# Patient Record
Sex: FEMALE | Race: WHITE | NOT HISPANIC OR LATINO | Employment: FULL TIME | URBAN - NONMETROPOLITAN AREA
[De-identification: names, ages, dates, MRNs, and addresses within clinical notes are randomized per-mention and may not be internally consistent; named-entity substitution may affect disease eponyms.]

---

## 2017-01-09 DIAGNOSIS — H91.93 DECREASED HEARING, BILATERAL: Primary | ICD-10-CM

## 2017-01-11 ENCOUNTER — OFFICE VISIT (OUTPATIENT)
Dept: FAMILY MEDICINE | Facility: OTHER | Age: 22
End: 2017-01-11
Attending: FAMILY MEDICINE
Payer: COMMERCIAL

## 2017-01-11 VITALS
DIASTOLIC BLOOD PRESSURE: 64 MMHG | TEMPERATURE: 99.4 F | HEART RATE: 80 BPM | BODY MASS INDEX: 35.97 KG/M2 | SYSTOLIC BLOOD PRESSURE: 118 MMHG | OXYGEN SATURATION: 99 % | HEIGHT: 63 IN | WEIGHT: 203 LBS

## 2017-01-11 DIAGNOSIS — J01.90 ACUTE SINUSITIS, RECURRENCE NOT SPECIFIED, UNSPECIFIED LOCATION: Primary | ICD-10-CM

## 2017-01-11 PROCEDURE — 99213 OFFICE O/P EST LOW 20 MIN: CPT | Performed by: FAMILY MEDICINE

## 2017-01-11 RX ORDER — FLUTICASONE PROPIONATE 50 MCG
1-2 SPRAY, SUSPENSION (ML) NASAL DAILY
Qty: 1 BOTTLE | Refills: 1 | Status: SHIPPED | OUTPATIENT
Start: 2017-01-11 | End: 2017-02-02

## 2017-01-11 ASSESSMENT — PAIN SCALES - GENERAL: PAINLEVEL: NO PAIN (0)

## 2017-01-11 NOTE — PATIENT INSTRUCTIONS
Symptomatic cares - fluids, rest, saline sinus spray or neti pot.  Flonase daily - script sent.  Consider antibiotics if symptoms persist 10 days or more, or significantly worsening symptoms - fevers, etc.  Follow up as needed.

## 2017-01-11 NOTE — NURSING NOTE
"Chief Complaint   Patient presents with     Sinus Problem     sinus pain, runny nose, no fevers noted for the last 2 days.        Initial /64 mmHg  Pulse 80  Temp(Src) 99.4  F (37.4  C) (Tympanic)  Ht 5' 3\" (1.6 m)  Wt 203 lb (92.08 kg)  BMI 35.97 kg/m2  SpO2 99% Estimated body mass index is 35.97 kg/(m^2) as calculated from the following:    Height as of this encounter: 5' 3\" (1.6 m).    Weight as of this encounter: 203 lb (92.08 kg).  BP completed using cuff size: silvina Cooper      "

## 2017-01-11 NOTE — PROGRESS NOTES
SUBJECTIVE:  Kamila Ma is a 21 year old female here with concerns about sinus infection.  She states onset of symptoms were 2 day(s) ago.  She has had ethmoid, sphenoid pressure. Course of illness is same. Severity moderate  Current and Associated symptoms: nasal congestion and sore throat  Predisposing factors include sinusitis treated 1 month ago with Augmentin with good relief of symptoms. 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     Diarrhea 01/01/2011     Hearing loss 01/01/2011     Multiple allergies 01/01/2011     Otomycosis of right ear 09/10/2012     Mixed hearing loss 12/4/2015     Acute recurrent maxillary sinusitis 12/4/2015     Other iron deficiency anemia 7/18/2016     Bariatric surgery status 7/18/2016     Vasovagal near syncope 7/18/2016     Current Outpatient Prescriptions   Medication Sig Dispense Refill     fluticasone (FLONASE) 50 MCG/ACT spray Spray 1-2 sprays into both nostrils daily 1 Bottle 1     ferrous sulfate (IRON) 325 (65 FE) MG tablet Take 1 tablet (325 mg) by mouth 2 times daily 60 tablet 1     Pediatric Multivit-Minerals-C (FLINTSTONES COMPLETE PO) Take 1 tablet by mouth daily.        Calcium-Vitamin D-Vitamin K (VIACTIV PO) Take 1 tablet by mouth daily.        History   Smoking status     Never Smoker    Smokeless tobacco     Never Used       ROS:  CONSTITUTIONAL:NEGATIVE  for fever   INTEGUMENTARY/SKIN: NEGATIVE for rash generalized  EYES: NEGATIVE for vision changes or irritation  ENT/MOUTH: POSITIVE for nasal congestion, sinus pressure and sore throat and NEGATIVE for ear pain bilateral and swollen glands  RESP:NEGATIVE for significant cough or SOB  CV: NEGATIVE for chest pain, palpitations or peripheral edema  GI: NEGATIVE for nausea, abdominal pain, heartburn, or change in bowel habits    OBJECTIVE:  /64 mmHg  Pulse 80  Temp(Src) 99.4  F  "(37.4  C) (Tympanic)  Ht 5' 3\" (1.6 m)  Wt 203 lb (92.08 kg)  BMI 35.97 kg/m2  SpO2 99%  Exam:GENERAL APPEARANCE: healthy, alert and no distress  EYES: EOMI,  PERRL, conjunctiva clear  HENT: TMs with scarring, perforations, old; no erythema, drainage; canals normal  NECK: supple, nontender, no lymphadenopathy  RESP: lungs clear to auscultation - no rales, rhonchi or wheezes  CV: regular rates and rhythm, normal S1 S2, no murmur noted  SKIN: no suspicious lesions or rashes    ASSESSMENT:  (J01.90) Acute sinusitis, recurrence not specified, unspecified location  (primary encounter diagnosis)  Plan: fluticasone (FLONASE) 50 MCG/ACT spray    PLAN:  Patient Instructions   Symptomatic cares - fluids, rest, saline sinus spray or neti pot.  Flonase daily - script sent.  Consider antibiotics if symptoms persist 10 days or more, or significantly worsening symptoms - fevers, etc.  Follow up as needed.          "

## 2017-01-17 ENCOUNTER — OFFICE VISIT (OUTPATIENT)
Dept: AUDIOLOGY | Facility: OTHER | Age: 22
End: 2017-01-17
Attending: AUDIOLOGIST
Payer: COMMERCIAL

## 2017-01-17 DIAGNOSIS — H90.6 MIXED HEARING LOSS, BILATERAL: Primary | ICD-10-CM

## 2017-01-17 DIAGNOSIS — H69.91 DYSFUNCTION OF EUSTACHIAN TUBE, RIGHT: ICD-10-CM

## 2017-01-17 PROCEDURE — 92557 COMPREHENSIVE HEARING TEST: CPT | Performed by: AUDIOLOGIST

## 2017-01-17 PROCEDURE — 92567 TYMPANOMETRY: CPT | Performed by: AUDIOLOGIST

## 2017-01-17 NOTE — PROGRESS NOTES
Audiology Evaluation Completed. Please refer SCANNED AUDIOGRAM and/or TYMPANOGRAM for BACKGROUND, RESULTS, RECOMMENDATIONS.    UNDER RECOMMENDATIONS ON AUDIOGRAM PATIENT REFERRED TO ENT WITH SYMPTOMS      Maria R WIGGINS, [facility]-A  Audiologist #[id number]        [facility] REFERRAL/ORDER NEEDED TO ENT BY AUDIOLOGY completed

## 2017-02-02 ENCOUNTER — OFFICE VISIT (OUTPATIENT)
Dept: FAMILY MEDICINE | Facility: OTHER | Age: 22
End: 2017-02-02
Attending: FAMILY MEDICINE
Payer: COMMERCIAL

## 2017-02-02 VITALS
WEIGHT: 210 LBS | HEART RATE: 68 BPM | BODY MASS INDEX: 37.21 KG/M2 | HEIGHT: 63 IN | SYSTOLIC BLOOD PRESSURE: 110 MMHG | DIASTOLIC BLOOD PRESSURE: 74 MMHG | TEMPERATURE: 98.9 F

## 2017-02-02 DIAGNOSIS — K59.09 OTHER CONSTIPATION: ICD-10-CM

## 2017-02-02 DIAGNOSIS — N92.6 IRREGULAR MENSTRUAL CYCLE: ICD-10-CM

## 2017-02-02 DIAGNOSIS — R10.84 ABDOMINAL PAIN, GENERALIZED: Primary | ICD-10-CM

## 2017-02-02 LAB
ALBUMIN SERPL-MCNC: 3.6 G/DL (ref 3.4–5)
ALP SERPL-CCNC: 65 U/L (ref 40–150)
ALT SERPL W P-5'-P-CCNC: 19 U/L (ref 0–50)
ANION GAP SERPL CALCULATED.3IONS-SCNC: 5 MMOL/L (ref 3–14)
AST SERPL W P-5'-P-CCNC: 12 U/L (ref 0–45)
BASOPHILS # BLD AUTO: 0 10E9/L (ref 0–0.2)
BASOPHILS NFR BLD AUTO: 0.6 %
BILIRUB SERPL-MCNC: 0.3 MG/DL (ref 0.2–1.3)
BUN SERPL-MCNC: 15 MG/DL (ref 7–30)
CALCIUM SERPL-MCNC: 9 MG/DL (ref 8.5–10.1)
CHLORIDE SERPL-SCNC: 106 MMOL/L (ref 94–109)
CO2 SERPL-SCNC: 28 MMOL/L (ref 20–32)
CREAT SERPL-MCNC: 0.74 MG/DL (ref 0.52–1.04)
DIFFERENTIAL METHOD BLD: ABNORMAL
EOSINOPHIL # BLD AUTO: 0.1 10E9/L (ref 0–0.7)
EOSINOPHIL NFR BLD AUTO: 1.1 %
ERYTHROCYTE [DISTWIDTH] IN BLOOD BY AUTOMATED COUNT: 14.7 % (ref 10–15)
GFR SERPL CREATININE-BSD FRML MDRD: NORMAL ML/MIN/1.7M2
GLUCOSE SERPL-MCNC: 83 MG/DL (ref 70–99)
HCT VFR BLD AUTO: 36.4 % (ref 35–47)
HGB BLD-MCNC: 11.4 G/DL (ref 11.7–15.7)
IMM GRANULOCYTES # BLD: 0 10E9/L (ref 0–0.4)
IMM GRANULOCYTES NFR BLD: 0.3 %
LYMPHOCYTES # BLD AUTO: 2.6 10E9/L (ref 0.8–5.3)
LYMPHOCYTES NFR BLD AUTO: 39.8 %
MCH RBC QN AUTO: 23.9 PG (ref 26.5–33)
MCHC RBC AUTO-ENTMCNC: 31.3 G/DL (ref 31.5–36.5)
MCV RBC AUTO: 76 FL (ref 78–100)
MONOCYTES # BLD AUTO: 0.4 10E9/L (ref 0–1.3)
MONOCYTES NFR BLD AUTO: 6.3 %
NEUTROPHILS # BLD AUTO: 3.4 10E9/L (ref 1.6–8.3)
NEUTROPHILS NFR BLD AUTO: 51.9 %
NRBC # BLD AUTO: 0 10*3/UL
NRBC BLD AUTO-RTO: 0 /100
PLATELET # BLD AUTO: 378 10E9/L (ref 150–450)
POTASSIUM SERPL-SCNC: 4.2 MMOL/L (ref 3.4–5.3)
PROT SERPL-MCNC: 7.1 G/DL (ref 6.8–8.8)
RBC # BLD AUTO: 4.77 10E12/L (ref 3.8–5.2)
SODIUM SERPL-SCNC: 139 MMOL/L (ref 133–144)
WBC # BLD AUTO: 6.6 10E9/L (ref 4–11)

## 2017-02-02 PROCEDURE — 99214 OFFICE O/P EST MOD 30 MIN: CPT | Performed by: FAMILY MEDICINE

## 2017-02-02 PROCEDURE — 85025 COMPLETE CBC W/AUTO DIFF WBC: CPT | Performed by: FAMILY MEDICINE

## 2017-02-02 PROCEDURE — 74020 ZZHC X-RAY ABDOMEN COMPLETE: CPT | Mod: TC | Performed by: RADIOLOGY

## 2017-02-02 PROCEDURE — 80053 COMPREHEN METABOLIC PANEL: CPT | Performed by: FAMILY MEDICINE

## 2017-02-02 PROCEDURE — 36415 COLL VENOUS BLD VENIPUNCTURE: CPT | Performed by: FAMILY MEDICINE

## 2017-02-02 ASSESSMENT — ANXIETY QUESTIONNAIRES
IF YOU CHECKED OFF ANY PROBLEMS ON THIS QUESTIONNAIRE, HOW DIFFICULT HAVE THESE PROBLEMS MADE IT FOR YOU TO DO YOUR WORK, TAKE CARE OF THINGS AT HOME, OR GET ALONG WITH OTHER PEOPLE: NOT DIFFICULT AT ALL
3. WORRYING TOO MUCH ABOUT DIFFERENT THINGS: NOT AT ALL
GAD7 TOTAL SCORE: 0
5. BEING SO RESTLESS THAT IT IS HARD TO SIT STILL: NOT AT ALL
1. FEELING NERVOUS, ANXIOUS, OR ON EDGE: NOT AT ALL
6. BECOMING EASILY ANNOYED OR IRRITABLE: NOT AT ALL
7. FEELING AFRAID AS IF SOMETHING AWFUL MIGHT HAPPEN: NOT AT ALL
2. NOT BEING ABLE TO STOP OR CONTROL WORRYING: NOT AT ALL

## 2017-02-02 ASSESSMENT — PAIN SCALES - GENERAL: PAINLEVEL: NO PAIN (0)

## 2017-02-02 ASSESSMENT — PATIENT HEALTH QUESTIONNAIRE - PHQ9: 5. POOR APPETITE OR OVEREATING: NOT AT ALL

## 2017-02-02 NOTE — MR AVS SNAPSHOT
"              After Visit Summary   2/2/2017    Kamila Ma    MRN: [id number]           Patient Information     Date Of Birth          1995        Visit Information        Provider Department      2/2/2017 11:00 AM Sol Kim MD [facility]        Today's Diagnoses     Abdominal pain, generalized    -  1       Care Instructions    Increase fiber, fluids and exercise        Follow-ups after your visit        Your next 10 appointments already scheduled     Feb 07, 2017  8:45 AM   (Arrive by 8:30 AM)   New Visit with India Raymundo PA-C   [facility] ([facility])    [address] [address]   [phone]            Feb 07, 2017  9:15 AM   (Arrive by 9:00 AM)   Hearing Aid Initial with CHRIS Sloan   [facility] ([facility])    [address] [address]   [phone]              Who to contact     If you have questions or need follow up information about today's clinic visit or your schedule please contact [facility] directly at [phone].  Normal or non-critical lab and imaging results will be communicated to you by [employer], letter or phone within 4 business days after the clinic has received the results. If you do not hear from us within 7 days, please contact the clinic through [employer] or phone. If you have a critical or abnormal lab result, we will notify you by phone as soon as possible.  Submit refill requests through [employer] or call your pharmacy and they will forward the refill request to us. Please allow 3 business days for your refill to be completed.          Additional Information About Your Visit        MyChart Information     [employer] lets you send messages to your doctor, view your test results, renew your prescriptions, schedule appointments and more. To sign up, go to [email or web address] . Click on \"Log in\" on the left side of the screen, which will take you to the Welcome " "page. Then click on \"Sign up Now\" on the right side of the page.     You will be asked to enter the access code listed below, as well as some personal information. Please follow the directions to create your username and password.     Your access code is: [email or web address]  Expires: 3/8/2017  9:41 AM     Your access code will  in 90 days. If you need help or a new code, please call your [place] clinic or [phone].        Care EveryWhere ID     This is your Care EveryWhere ID. This could be used by other organizations to access your [place] medical records  [id number]        Your Vitals Were     Pulse Temperature Height BMI (Body Mass Index)          68 98.9  F (37.2  C) (Tympanic) 5' 3\" (1.6 m) 37.21 kg/m2         Blood Pressure from Last 3 Encounters:   17 110/74   17 118/64   16 108/60    Weight from Last 3 Encounters:   17 210 lb (95.255 kg)   17 203 lb (92.08 kg)   16 205 lb (92.987 kg)              We Performed the Following     CBC with platelets and differential     Comprehensive metabolic panel     XR ABDOMEN 2 VW (Clinic Performed)          Today's Medication Changes          These changes are accurate as of: 17 12:15 PM.  If you have any questions, ask your nurse or doctor.               Stop taking these medicines if you haven't already. Please contact your care team if you have questions.     fluticasone 50 MCG/ACT spray   Commonly known as:  FLONASE   Stopped by:  Sol Kim MD                    Primary Care Provider Office Phone # Fax #    Sol Kim [phone]       [facility] [place] [address] [place], [place] [address]  [place] [address]        Thank you!     Thank you for choosing [facility]  for your care. Our goal is always to provide you with excellent care. Hearing back from our patients is one way we can continue to improve our services. Please take a few minutes to complete the written survey that you may " receive in the mail after your visit with us. Thank you!             Your Updated Medication List - Protect others around you: Learn how to safely use, store and throw away your medicines at www.disposemymeds.org.          This list is accurate as of: 2/2/17 12:15 PM.  Always use your most recent med list.                   Brand Name Dispense Instructions for use    ferrous sulfate 325 (65 FE) MG tablet    IRON    60 tablet    Take 1 tablet (325 mg) by mouth 2 times daily       FLINTSTONES COMPLETE PO      Take 1 tablet by mouth daily.       VIACTIV PO      Take 1 tablet by mouth daily.

## 2017-02-02 NOTE — NURSING NOTE
"Chief Complaint   Patient presents with     Abdominal Pain       Initial /74 mmHg  Pulse 68  Temp(Src) 98.9  F (37.2  C) (Tympanic)  Ht 5' 3\" (1.6 m)  Wt 210 lb (95.255 kg)  BMI 37.21 kg/m2 Estimated body mass index is 37.21 kg/(m^2) as calculated from the following:    Height as of this encounter: 5' 3\" (1.6 m).    Weight as of this encounter: 210 lb (95.255 kg).  BP completed using cuff size: jazzy Bennett    "

## 2017-02-02 NOTE — PROGRESS NOTES
SUBJECTIVE:                                                    Kamila Ma is a 21 year old female who presents to clinic today for the following health issues:      Abdominal Pain      Duration: 1-2 weeks    Description (location/character/radiation): stabbing pain in LLQ and when laying down it goes all over       Associated flank pain: None    Intensity:  moderate    Accompanying signs and symptoms:        Fever/Chills: no        Gas/Bloating: YES- gas       Nausea/vomitting: no        Diarrhea: no        Dysuria or Hematuria: no     History (previous similar pain/trauma/previous testing): none    Precipitating or alleviating factors:       Pain worse with eating/BM/urination: no       Pain relieved by BM: YES- subside for a little while    Therapies tried and outcome: fiber bars    LMP:  1/16/17 approx       Vaginal Bleeding (Dysmenorrhea)      Onset: very irregular    Description:  Duration of bleeding episodes: 5-14 days  Frequency between periods:  14-90 days  Describe bleeding/flow:   Clots: YES  Number of pads/hour: 1  Cramping: severe and moderate    Intensity:  mild, moderate    Accompanying signs and symptoms: weight gain, hirsuit    History (similar episodes/previous evaluation): suspects PCOS but never treated in the past    Precipitating or alleviating factors: None    Therapies tried and outcome: OCPs in the past but she got terribly cervantes         Problem list and histories reviewed & adjusted, as indicated.  Additional history: as documented    Current Outpatient Prescriptions   Medication Sig Dispense Refill     ferrous sulfate (IRON) 325 (65 FE) MG tablet Take 1 tablet (325 mg) by mouth 2 times daily 60 tablet 1     Pediatric Multivit-Minerals-C (FLINTSTONES COMPLETE PO) Take 1 tablet by mouth daily.        Calcium-Vitamin D-Vitamin K (VIACTIV PO) Take 1 tablet by mouth daily.        Problem list, Medication list, Allergies, and Medical/Social/Surgical histories reviewed in [facility] and  "updated as appropriate.    ROS:  Constitutional, HEENT, cardiovascular, pulmonary, gi and gu systems are negative, except as otherwise noted.    OBJECTIVE:                                                    /74 mmHg  Pulse 68  Temp(Src) 98.9  F (37.2  C) (Tympanic)  Ht 5' 3\" (1.6 m)  Wt 210 lb (95.255 kg)  BMI 37.21 kg/m2  Body mass index is 37.21 kg/(m^2).  GENERAL APPEARANCE: healthy, alert, no distress and over weight  RESP: lungs clear to auscultation - no rales, rhonchi or wheezes  CV: regular rates and rhythm, normal S1 S2, no S3 or S4 and no murmur, click or rub  ABDOMEN: bowel sounds normal, obese, no hepatosplenomegaly or masses and tender LLQ  PSYCH: mentation appears normal and affect normal/bright       ASSESSMENT/PLAN:                                                    1. Abdominal pain, generalized    - CBC with platelets and differential  - Comprehensive metabolic panel  - XR ABDOMEN 2 VW (Clinic Performed)    2. Irregular menstrual cycle  Follow up to dicussed further    3.  Constipation  Discussed intreasing fluids, fiber, activity and good bacteria, suggest citrucel powder    Patient was agreeable to this plan and had no further questions.  See Patient Instructions    Sol Kim MD  [facility] [place]        "

## 2017-02-03 ASSESSMENT — ANXIETY QUESTIONNAIRES: GAD7 TOTAL SCORE: 0

## 2017-02-07 ENCOUNTER — OFFICE VISIT (OUTPATIENT)
Dept: AUDIOLOGY | Facility: OTHER | Age: 22
End: 2017-02-07
Attending: AUDIOLOGIST
Payer: COMMERCIAL

## 2017-02-07 ENCOUNTER — OFFICE VISIT (OUTPATIENT)
Dept: OTOLARYNGOLOGY | Facility: OTHER | Age: 22
End: 2017-02-07
Attending: PHYSICIAN ASSISTANT
Payer: COMMERCIAL

## 2017-02-07 VITALS
DIASTOLIC BLOOD PRESSURE: 74 MMHG | BODY MASS INDEX: 37.21 KG/M2 | WEIGHT: 210 LBS | TEMPERATURE: 98.6 F | SYSTOLIC BLOOD PRESSURE: 116 MMHG | HEIGHT: 63 IN | HEART RATE: 90 BPM

## 2017-02-07 DIAGNOSIS — H90.6 MIXED HEARING LOSS, BILATERAL: Primary | ICD-10-CM

## 2017-02-07 DIAGNOSIS — J34.2 DNS (DEVIATED NASAL SEPTUM): ICD-10-CM

## 2017-02-07 DIAGNOSIS — H69.91 DYSFUNCTION OF EUSTACHIAN TUBE, RIGHT: ICD-10-CM

## 2017-02-07 DIAGNOSIS — R09.81 NASAL CONGESTION: ICD-10-CM

## 2017-02-07 DIAGNOSIS — J34.89 NASAL OBSTRUCTION: ICD-10-CM

## 2017-02-07 PROCEDURE — 92590 ZZHC HEARING AID EXAM MONAURAL: CPT | Performed by: AUDIOLOGIST

## 2017-02-07 PROCEDURE — 92504 EAR MICROSCOPY EXAMINATION: CPT | Performed by: PHYSICIAN ASSISTANT

## 2017-02-07 PROCEDURE — 99214 OFFICE O/P EST MOD 30 MIN: CPT | Mod: 25 | Performed by: PHYSICIAN ASSISTANT

## 2017-02-07 ASSESSMENT — PAIN SCALES - GENERAL: PAINLEVEL: NO PAIN (0)

## 2017-02-07 NOTE — PROGRESS NOTES
BACKGROUND:  Kamila was seen today for consult regarding hearing aids, accompanied by her mother. The patient notes difficulty with communication in a variety of listening situations and would like to explore possible benefit obtained via use of amplification.      Patient has received medical clearance for hearing aid use from [place] LYNCH.  Kamila is a binaural hearing aid candidate and will receive a Hearing Aid Recommendation today for the right ear only. Kamila states she only wants a new aid for the right to replace her 6 year old aid that is glued together. She does not want it repaired reporting she has done this before.    RESULTS:  Brochure form [place] [employer] titled 'Legal rights and consumer information about purchasing a hearing instrument ' verbally reviewed and given to patient. Trial Period, cancellation fee, warranties highlighted. Estimated insurance coverage for hearing aids reviewed. She reports receiving third party assistance from [facility].         Thru use of hearing aids she would like to improve her ability to hear:     where there are groups and noise. She is in this environment weekly.    She would like to hear the television at a lower volume so that she can enjoy this activity with other people.     Kamila also reports trouble hearing in the car, at home, and on the telephone if there is not a volume control.      Discussed hearing aid styles, technology, features, and options at length with Kamila.  Sample devices were shown. Pros/Cons of options reviewed.  Expectations for amplification discussed. .Also discussed the importance of binaural amplification for hearing speech in the presence of background noise and localizing the directions of sounds.  Wireless options were also discussed at length and sample devices were shown.     Hearing Aid Recommendations: Hearing Aid Recommendation reviewed with patient. Pt chose to proceed with order and Purchase  Agreement completed. Copies provided to patient.      Hearing Aids:  Right Unitron Stride P BTE  Color: beige  Battery Size: 13  Earmold/Tips: Patient will use her current earmold      RECOMMENDATIONS:  Recommended to return to clinic in 2 weeks for hearing aid fitting, programming and orientation once hearing aid is ordered following payment by DERRELL LAU.    Maria R Emmanuel M.S.,CCC-A  Audiologist #[id number]

## 2017-02-07 NOTE — PATIENT INSTRUCTIONS
Sinus CT to be completed    Follow up for SInus CT-  May use FLonase 2 sprays to each nostril.   If there are concerns or questions, Call [phone] and ask for nurse

## 2017-02-07 NOTE — MR AVS SNAPSHOT
"              After Visit Summary   2/7/2017    Kamila Ma    MRN: [id number]           Patient Information     Date Of Birth          1995        Visit Information        Provider Department      2/7/2017 8:45 AM India Raymundo PA-C [facility] [place]        Today's Diagnoses     Mixed hearing loss, bilateral    -  1     Dysfunction of eustachian tube, right         DNS (deviated nasal septum)         Nasal congestion         Nasal obstruction           Care Instructions    Sinus CT to be completed    Follow up for SInus CT-  May use FLonase 2 sprays to each nostril.   If there are concerns or questions, Call [phone] and ask for nurse        Follow-ups after your visit        Your next 10 appointments already scheduled     Feb 07, 2017  9:15 AM   (Arrive by 9:00 AM)   Hearing Aid Initial with CHRIS Sloan   [facility] [place] ([facility])    [address]  [place] [address]   [phone]              Who to contact     If you have questions or need follow up information about today's clinic visit or your schedule please contact [facility] directly at [phone].  Normal or non-critical lab and imaging results will be communicated to you by [employer], letter or phone within 4 business days after the clinic has received the results. If you do not hear from us within 7 days, please contact the clinic through [employer] or phone. If you have a critical or abnormal lab result, we will notify you by phone as soon as possible.  Submit refill requests through [employer] or call your pharmacy and they will forward the refill request to us. Please allow 3 business days for your refill to be completed.          Additional Information About Your Visit        [employer] Information     [employer] lets you send messages to your doctor, view your test results, renew your prescriptions, schedule appointments and more. To sign up, go to [email or web address] . Click on \"Log in\" on " "the left side of the screen, which will take you to the Welcome page. Then click on \"Sign up Now\" on the right side of the page.     You will be asked to enter the access code listed below, as well as some personal information. Please follow the directions to create your username and password.     Your access code is: [email or web address]  Expires: 3/8/2017  9:41 AM     Your access code will  in 90 days. If you need help or a new code, please call your [place] clinic or [phone].        Care EveryWhere ID     This is your Care EveryWhere ID. This could be used by other organizations to access your [place] medical records  [id number]        Your Vitals Were     Pulse Temperature Height BMI (Body Mass Index)          90 98.6  F (37  C) (Tympanic) 5' 3\" (1.6 m) 37.21 kg/m2         Blood Pressure from Last 3 Encounters:   17 116/74   17 110/74   17 118/64    Weight from Last 3 Encounters:   17 210 lb (95.255 kg)   17 210 lb (95.255 kg)   17 203 lb (92.08 kg)              Today, you had the following     No orders found for display       Primary Care Provider Office Phone # Fax #    Sol Kim [phone]       [facility] [place] [address] [place] [id number]        Thank you!     Thank you for choosing [facility]  for your care. Our goal is always to provide you with excellent care. Hearing back from our patients is one way we can continue to improve our services. Please take a few minutes to complete the written survey that you may receive in the mail after your visit with us. Thank you!             Your Updated Medication List - Protect others around you: Learn how to safely use, store and throw away your medicines at www.disposemymeds.org.          This list is accurate as of: 17  8:52 AM.  Always use your most recent med list.                   Brand Name Dispense Instructions for use    ferrous sulfate 325 (65 FE) MG tablet "    IRON    60 tablet    Take 1 tablet (325 mg) by mouth 2 times daily       FLINTSTONES COMPLETE PO      Take 1 tablet by mouth daily.       VIACTIV PO      Take 1 tablet by mouth daily.

## 2017-02-07 NOTE — PROGRESS NOTES
Chief Complaint   Patient presents with     Consult     Mixed Hearing Loss; HA Medical Clearance     Kamila returns for medical clearance for hearing aids. She has a hx of MHL, bilateral. Her last ENT visit was in 2010 and last audiogram was completed in 1/2010.   She has overall been doing well.   She has noticed decrease with her hearing.   Patient has one aid and does not have coverage and wishes to only obtain aid for right ear.   Denies otalgia, otorrhea. She has had no recent OM.   Hx of BTT, 4.     Past hx  CT of Temporal bones, mastoid- 2/2015   Right ear changes with thickened TM c/w recurrent Otitis media and mastoiditis   She was referred to [facility] due to results from CT. There was a dehiscent right jugular bulb, Crouzon's Dz, MHL; they did not recommend further intervention.      Kamila has noticed an increase in nasal congestion, obstruction. She has no prior imaging of her sinuses. She has a hard time breathing thru right nares.   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     Diarrhea 01/01/2011     Hearing loss 01/01/2011     Multiple allergies 01/01/2011     Otomycosis of right ear 09/10/2012     Mixed hearing loss 12/4/2015     Acute recurrent maxillary sinusitis 12/4/2015     Other iron deficiency anemia 7/18/2016     Bariatric surgery status 7/18/2016     Vasovagal near syncope 7/18/2016        Allergies   Allergen Reactions     Sulfa Drugs Hives     SULFA (SULFONAMIDE ANTIBIOTICS)     Topamax Diarrhea     Current Outpatient Prescriptions   Medication     ferrous sulfate (IRON) 325 (65 FE) MG tablet     Pediatric Multivit-Minerals-C (FLINTSTONES COMPLETE PO)     Calcium-Vitamin D-Vitamin K (VIACTIV PO)     No current facility-administered medications for this visit.      ROS: 10 point ROS neg other than the symptoms noted above in the HPI.  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    Eyes: conjuctiva clear, PERRL, EOM intact  Ears: External ears normal. As noted below.    Nose: Nares normal. DNS to right with obstruction. Left with TH  Mouth: normal  Neck: Supple, no cervical adenopathy, no thyromegaly, Full range of motion in all planes      The lips appear normal and without lesion.  The dentition is  in good condition  The patient has a normal appearing and functioning hard and soft palate without bifid uvula or submucosal cleft.  The tongue is normal in appearance without erosive lesion or fungating mass.  The oral mucosa is soft and normal in appearance.  The patient also has a soft floor of mouth and base of tongue.  The tonsils are absent.    The ear canals were examined underneath the operating microscope and with an otologic speculum.  The canals were cleaned of all debris with gently suctioning and use of alligator forceps.   There is no granulation tissue or sign of cholesteatoma.  The patient tolerates this well. Right canal clear, scant cerumen removed. TM appears intact with jugular bulb.   Left canal clear. TM appears intact without perforation. No effusion.     Audiogram  Type As/B right and Type A left  Thresholds are overall moderate to moderate severe mixed loss with a decrease compared to 2010.       ASSESSMENT:    ICD-10-CM    1. Mixed hearing loss, bilateral H90.6    2. Dysfunction of eustachian tube, right H69.81    3. DNS (deviated nasal septum) J34.2 CT Sinus w/o Contrast     CT Sinus w/o Contrast   4. Nasal congestion R09.81 CT Sinus w/o Contrast     CT Sinus w/o Contrast   5. Nasal obstruction J34.89 CT Sinus w/o Contrast     CT Sinus w/o Contrast     Medically cleared for hearing aids.   She had completed " prior imaging which was reviewed and consults were completed.   Kamila was encouraged to maintain annual ear visits and audiogram due to her hx.       Pending Sinus CT. She may consider follow up with Dr. Casillas to review surgical options. She is interested at this time. Information provided and CT ordered        India Raymundo PA-C  ENT  [facility]  [phone]

## 2017-02-07 NOTE — NURSING NOTE
"Chief Complaint   Patient presents with     Consult     Mixed Hearing Loss; HA Medical Clearance       Initial /74 mmHg  Pulse 90  Temp(Src) 98.6  F (37  C) (Tympanic)  Ht 5' 3\" (1.6 m)  Wt 210 lb (95.255 kg)  BMI 37.21 kg/m2 Estimated body mass index is 37.21 kg/(m^2) as calculated from the following:    Height as of this encounter: 5' 3\" (1.6 m).    Weight as of this encounter: 210 lb (95.255 kg).  Medication Reconciliation: complete   Corina Robb      "

## 2017-03-01 ENCOUNTER — TELEPHONE (OUTPATIENT)
Dept: FAMILY MEDICINE | Facility: OTHER | Age: 22
End: 2017-03-01

## 2017-03-01 ENCOUNTER — OFFICE VISIT (OUTPATIENT)
Dept: FAMILY MEDICINE | Facility: OTHER | Age: 22
End: 2017-03-01
Attending: FAMILY MEDICINE
Payer: COMMERCIAL

## 2017-03-01 VITALS
TEMPERATURE: 98.4 F | HEART RATE: 104 BPM | BODY MASS INDEX: 39.86 KG/M2 | WEIGHT: 225 LBS | SYSTOLIC BLOOD PRESSURE: 116 MMHG | DIASTOLIC BLOOD PRESSURE: 70 MMHG | OXYGEN SATURATION: 98 %

## 2017-03-01 DIAGNOSIS — R68.83 CHILLS: ICD-10-CM

## 2017-03-01 DIAGNOSIS — J11.1 INFLUENZA-LIKE ILLNESS: ICD-10-CM

## 2017-03-01 DIAGNOSIS — R05.9 COUGH: Primary | ICD-10-CM

## 2017-03-01 LAB
BASOPHILS # BLD AUTO: 0 10E9/L (ref 0–0.2)
BASOPHILS NFR BLD AUTO: 0.6 %
DIFFERENTIAL METHOD BLD: ABNORMAL
EOSINOPHIL # BLD AUTO: 0.1 10E9/L (ref 0–0.7)
EOSINOPHIL NFR BLD AUTO: 1.7 %
ERYTHROCYTE [DISTWIDTH] IN BLOOD BY AUTOMATED COUNT: 14.8 % (ref 10–15)
HCT VFR BLD AUTO: 36 % (ref 35–47)
HGB BLD-MCNC: 11.1 G/DL (ref 11.7–15.7)
IMM GRANULOCYTES # BLD: 0 10E9/L (ref 0–0.4)
IMM GRANULOCYTES NFR BLD: 0.4 %
LYMPHOCYTES # BLD AUTO: 0.8 10E9/L (ref 0.8–5.3)
LYMPHOCYTES NFR BLD AUTO: 17.7 %
MCH RBC QN AUTO: 23.6 PG (ref 26.5–33)
MCHC RBC AUTO-ENTMCNC: 30.8 G/DL (ref 31.5–36.5)
MCV RBC AUTO: 76 FL (ref 78–100)
MONOCYTES # BLD AUTO: 0.5 10E9/L (ref 0–1.3)
MONOCYTES NFR BLD AUTO: 10.3 %
NEUTROPHILS # BLD AUTO: 3.2 10E9/L (ref 1.6–8.3)
NEUTROPHILS NFR BLD AUTO: 69.3 %
NRBC # BLD AUTO: 0 10*3/UL
NRBC BLD AUTO-RTO: 0 /100
PLATELET # BLD AUTO: 321 10E9/L (ref 150–450)
RBC # BLD AUTO: 4.71 10E12/L (ref 3.8–5.2)
WBC # BLD AUTO: 4.7 10E9/L (ref 4–11)

## 2017-03-01 PROCEDURE — 85025 COMPLETE CBC W/AUTO DIFF WBC: CPT | Performed by: FAMILY MEDICINE

## 2017-03-01 PROCEDURE — 99213 OFFICE O/P EST LOW 20 MIN: CPT | Performed by: FAMILY MEDICINE

## 2017-03-01 PROCEDURE — 36415 COLL VENOUS BLD VENIPUNCTURE: CPT | Performed by: FAMILY MEDICINE

## 2017-03-01 PROCEDURE — 71020 ZZHC CHEST TWO VIEWS, FRONT/LAT: CPT | Mod: TC | Performed by: RADIOLOGY

## 2017-03-01 ASSESSMENT — PAIN SCALES - GENERAL: PAINLEVEL: NO PAIN (0)

## 2017-03-01 NOTE — MR AVS SNAPSHOT
"              After Visit Summary   3/1/2017    Kamila Ma    MRN: [id number]           Patient Information     Date Of Birth          1995        Visit Information        Provider Department      3/1/2017 9:30 AM Sol Kim MD [facility] [place]        Today's Diagnoses     Cough    -  1    Chills        Influenza-like illness           Follow-ups after your visit        Who to contact     If you have questions or need follow up information about today's clinic visit or your schedule please contact [facility] directly at [phone].  Normal or non-critical lab and imaging results will be communicated to you by Ready Solarhart, letter or phone within 4 business days after the clinic has received the results. If you do not hear from us within 7 days, please contact the clinic through Ready Solarhart or phone. If you have a critical or abnormal lab result, we will notify you by phone as soon as possible.  Submit refill requests through ChemiSense or call your pharmacy and they will forward the refill request to us. Please allow 3 business days for your refill to be completed.          Additional Information About Your Visit        MyChart Information     ChemiSense lets you send messages to your doctor, view your test results, renew your prescriptions, schedule appointments and more. To sign up, go to [email or web address] . Click on \"Log in\" on the left side of the screen, which will take you to the Welcome page. Then click on \"Sign up Now\" on the right side of the page.     You will be asked to enter the access code listed below, as well as some personal information. Please follow the directions to create your username and password.     Your access code is: [email or web address]  Expires: 3/8/2017  9:41 AM     Your access code will  in 90 days. If you need help or a new code, please call your [facility] or [phone].        Care EveryWhere ID     This is your Care EveryWhere ID. This " could be used by other organizations to access your [facility] medical records  [id number]        Your Vitals Were     Pulse Temperature Pulse Oximetry BMI (Body Mass Index)          104 98.4  F (36.9  C) 98% 39.86 kg/m2         Blood Pressure from Last 3 Encounters:   03/01/17 116/70   02/07/17 116/74   02/02/17 110/74    Weight from Last 3 Encounters:   03/01/17 225 lb (102.1 kg)   02/07/17 210 lb (95.3 kg)   02/02/17 210 lb (95.3 kg)              We Performed the Following     CBC with platelets and differential     XR CHEST 2 VW (Clinic Performed)        Primary Care Provider Office Phone # Fax #    Sol Kim [phone]       [facility] [place] [address] [facility] [address]        Thank you!     Thank you for choosing [facility]  for your care. Our goal is always to provide you with excellent care. Hearing back from our patients is one way we can continue to improve our services. Please take a few minutes to complete the written survey that you may receive in the mail after your visit with us. Thank you!             Your Updated Medication List - Protect others around you: Learn how to safely use, store and throw away your medicines at www.disposemymeds.org.          This list is accurate as of: 3/1/17 10:49 AM.  Always use your most recent med list.                   Brand Name Dispense Instructions for use    ferrous sulfate 325 (65 FE) MG tablet    IRON    60 tablet    Take 1 tablet (325 mg) by mouth 2 times daily       FLINTSTONES COMPLETE PO      Take 1 tablet by mouth daily.       VIACTIV PO      Take 1 tablet by mouth daily.

## 2017-03-01 NOTE — TELEPHONE ENCOUNTER
8:04 AM    Reason for Call: OVERBOOK    Patient is having the following symptoms: Cough/sinus congestion for 2 days.    The patient is requesting an appointment for ASAP with Dr Kim.    Was an appointment offered for this call? Yes    Preferred method for responding to this message: Telephone Call    If we cannot reach you directly, may we leave a detailed response at the number you provided? Yes    Can this message wait until your PCP/provider returns, if unavailable today? Not applicable    Sunita Leonardo

## 2017-03-01 NOTE — PROGRESS NOTES
SUBJECTIVE:                                                    Kamila Ma is a 21 year old female who presents to clinic today for the following health issues:        RESPIRATORY SYMPTOMS      Duration: last week, worse yesterday     Description  rhinorrhea, sore throat, cough, fever, chills, , headache and fatigue/malaise    Severity: mild    Accompanying signs and symptoms: None    History (predisposing factors):  none    Precipitating or alleviating factors: None    Therapies tried and outcome:  nyquil         Problem list and histories reviewed & adjusted, as indicated.  Additional history: as documented    Current Outpatient Prescriptions   Medication Sig Dispense Refill     ferrous sulfate (IRON) 325 (65 FE) MG tablet Take 1 tablet (325 mg) by mouth 2 times daily 60 tablet 1     Pediatric Multivit-Minerals-C (FLINTSTONES COMPLETE PO) Take 1 tablet by mouth daily.        Calcium-Vitamin D-Vitamin K (VIACTIV PO) Take 1 tablet by mouth daily.        Labs reviewed in EPIC    ROS:  Constitutional, HEENT, cardiovascular, pulmonary, gi and gu systems are negative, except as otherwise noted.    OBJECTIVE:                                                    /70  Pulse 104  Temp 98.4  F (36.9  C)  Wt 225 lb (102.1 kg)  SpO2 98%  BMI 39.86 kg/m2  Body mass index is 39.86 kg/(m^2).  GENERAL APPEARANCE: healthy, alert, no distress, over weight and fatigued  HENT: ear canals and TM's normal, nose and mouth without ulcers or lesions, tonsillar erythema and PND  NECK: no asymmetry, masses, or scars, thyroid normal to palpation and cervical adenopathy   RESP: lungs clear to auscultation - no rales, rhonchi or wheezes  CV: regular rates and rhythm, normal S1 S2, no S3 or S4 and no murmur, click or rub  PSYCH: mentation appears normal and affect normal/bright       ASSESSMENT/PLAN:                                                    1. Cough    - CBC with platelets and differential  - XR CHEST 2 VW (Clinic  Performed)    2. Chills    - CBC with platelets and differential  - XR CHEST 2 VW (Clinic Performed)    3. Influenza-like illness  Conservative measures discussed    Patient was agreeable to this plan and had no further questions.  See Patient Instructions    Sol Kim MD  [facility]

## 2017-03-01 NOTE — NURSING NOTE
"Chief Complaint   Patient presents with     URI       Initial /70  Pulse 104  Temp 98.4  F (36.9  C)  Wt 225 lb (102.1 kg)  SpO2 98%  BMI 39.86 kg/m2 Estimated body mass index is 39.86 kg/(m^2) as calculated from the following:    Height as of 2/7/17: 5' 3\" (1.6 m).    Weight as of this encounter: 225 lb (102.1 kg).  Medication Reconciliation: complete  "

## 2017-03-13 ENCOUNTER — HOSPITAL ENCOUNTER (OUTPATIENT)
Dept: CT IMAGING | Facility: HOSPITAL | Age: 22
Discharge: HOME | End: 2017-03-13
Attending: PHYSICIAN ASSISTANT | Admitting: PHYSICIAN ASSISTANT
Payer: COMMERCIAL

## 2017-03-13 PROCEDURE — 70486 CT MAXILLOFACIAL W/O DYE: CPT | Mod: TC

## 2017-03-22 ENCOUNTER — OFFICE VISIT (OUTPATIENT)
Dept: AUDIOLOGY | Facility: OTHER | Age: 22
End: 2017-03-22
Attending: AUDIOLOGIST
Payer: COMMERCIAL

## 2017-03-22 DIAGNOSIS — H90.6 MIXED HEARING LOSS, BILATERAL: Primary | ICD-10-CM

## 2017-03-22 PROCEDURE — V5299 HEARING SERVICE: HCPCS | Performed by: AUDIOLOGIST

## 2017-03-22 NOTE — MR AVS SNAPSHOT
"              After Visit Summary   3/22/2017    Kamila Ma    MRN: [id number]           Patient Information     Date Of Birth          1995        Visit Information        Provider Department      3/22/2017 3:30 PM Maria R Emmanuel AuD [facility] [place]        Today's Diagnoses     Mixed hearing loss, bilateral    -  1       Follow-ups after your visit        Your next 10 appointments already scheduled     Mar 27, 2017  3:30 PM CDT   (Arrive by 3:15 PM)   Return Visit with Gina Casillas MD   [facility] [address]   [phone]              Who to contact     If you have questions or need follow up information about today's clinic visit or your schedule please contact [facility] directly at [phone].  Normal or non-critical lab and imaging results will be communicated to you by MyChart, letter or phone within 4 business days after the clinic has received the results. If you do not hear from us within 7 days, please contact the clinic through MyChart or phone. If you have a critical or abnormal lab result, we will notify you by phone as soon as possible.  Submit refill requests through H[employer]Vue or call your pharmacy and they will forward the refill request to us. Please allow 3 business days for your refill to be completed.          Additional Information About Your Visit        MyChart Information     H[employer]Vue lets you send messages to your doctor, view your test results, renew your prescriptions, schedule appointments and more. To sign up, go to [email or web address] . Click on \"Log in\" on the left side of the screen, which will take you to the Welcome page. Then click on \"Sign up Now\" on the right side of the page.     You will be asked to enter the access code listed below, as well as some personal information. Please follow the directions to create your username and password.     Your access code is: " [email or web address]  Expires: 2017  3:34 PM     Your access code will  in 90 days. If you need help or a new code, please call your [facility] clinic or [phone].        Care EveryWhere ID     This is your Care EveryWhere ID. This could be used by other organizations to access your [facility] medical records  [id number]         Blood Pressure from Last 3 Encounters:   17 116/70   17 116/74   17 110/74    Weight from Last 3 Encounters:   17 225 lb (102.1 kg)   17 210 lb (95.3 kg)   17 210 lb (95.3 kg)              Today, you had the following     No orders found for display       Primary Care Provider Office Phone # Fax #    Sol Kim [phone]       [facility] [place] [address] [address]        Thank you!     [facility]  for your [facility]. Our goal is always to provide you with excellent care. Hearing back from our patients is one way we can continue to improve our services. Please take a few minutes to complete the written survey that you may receive in the mail after your visit with us. Thank you!             Your Updated Medication List - Protect others around you: Learn how to safely use, store and throw away your medicines at www.disposemymeds.org.          This list is accurate as of: 3/22/17  3:34 PM.  Always use your most recent med list.                   Brand Name Dispense Instructions for use    ferrous sulfate 325 (65 FE) MG tablet    IRON    60 tablet    Take 1 tablet (325 mg) by mouth 2 times daily       FLINTSTONES COMPLETE PO      Take 1 tablet by mouth daily.       VIACTIV PO      Take 1 tablet by mouth daily.

## 2017-03-22 NOTE — PROGRESS NOTES
Background: Patient scheduled for hearing aid fitting right aid and to be used with her current earmold. Earmold needs to be detached from old aid and attached to new aid for today's appointment.  Results: Patient reports trouble with her earmold just yesterday. The tubing is hardened and unable to be removed from old hearing aid/tone hook without tearing or stretching it. The tone hook is not the same on new aid so cannot be accomplished this way. Offered estimate of in office tubing replacement which is needed, however she reports her earmold in in warranty with HEAR and will bring it there. Patient tried to remove tubing from tone hook and  tubing pulled out of earmold.    Hearing aid programming/conformity evaluation to be accomplished with earmold on ear.  Recommendations: Patient will call in for hearing aid fitting once earmold is fixed.  Maria R Emmanuel M.S., [facility]  Audiologist #[id number]

## 2017-04-28 ENCOUNTER — OFFICE VISIT (OUTPATIENT)
Dept: OTOLARYNGOLOGY | Facility: OTHER | Age: 22
End: 2017-04-28
Attending: OTOLARYNGOLOGY
Payer: COMMERCIAL

## 2017-04-28 VITALS
TEMPERATURE: 97.9 F | OXYGEN SATURATION: 98 % | BODY MASS INDEX: 39.56 KG/M2 | SYSTOLIC BLOOD PRESSURE: 110 MMHG | HEIGHT: 62 IN | DIASTOLIC BLOOD PRESSURE: 66 MMHG | HEART RATE: 88 BPM | WEIGHT: 215 LBS

## 2017-04-28 DIAGNOSIS — J34.2 DNS (DEVIATED NASAL SEPTUM): Primary | ICD-10-CM

## 2017-04-28 DIAGNOSIS — J32.2 CHRONIC ETHMOIDAL SINUSITIS: ICD-10-CM

## 2017-04-28 DIAGNOSIS — J34.3 NASAL TURBINATE HYPERTROPHY: ICD-10-CM

## 2017-04-28 DIAGNOSIS — H90.8 MHL (MIXED HEARING LOSS): ICD-10-CM

## 2017-04-28 DIAGNOSIS — Q75.1 CROUZON CRANIOFACIAL DYSOSTOSIS: ICD-10-CM

## 2017-04-28 PROCEDURE — 99214 OFFICE O/P EST MOD 30 MIN: CPT | Mod: 25 | Performed by: OTOLARYNGOLOGY

## 2017-04-28 PROCEDURE — 31231 NASAL ENDOSCOPY DX: CPT | Performed by: OTOLARYNGOLOGY

## 2017-04-28 RX ORDER — FLUTICASONE PROPIONATE 50 MCG
2 SPRAY, SUSPENSION (ML) NASAL DAILY
Qty: 16 G | Refills: 12 | Status: SHIPPED | OUTPATIENT
Start: 2017-04-28 | End: 2017-05-28

## 2017-04-28 RX ORDER — ECHINACEA PURPUREA EXTRACT 125 MG
2 TABLET ORAL 4 TIMES DAILY
Qty: 1 BOTTLE | Status: SHIPPED | OUTPATIENT
Start: 2017-04-28 | End: 2017-06-09

## 2017-04-28 ASSESSMENT — PAIN SCALES - GENERAL: PAINLEVEL: NO PAIN (0)

## 2017-04-28 NOTE — NURSING NOTE
"Chief Complaint   Patient presents with     Other     schedule surgery        Initial /66 (BP Location: Right arm, Patient Position: Chair, Cuff Size: Adult Regular)  Pulse 88  Temp 97.9  F (36.6  C) (Tympanic)  Ht 5' 2\" (1.575 m)  Wt 215 lb (97.5 kg)  SpO2 98%  BMI 39.32 kg/m2 Estimated body mass index is 39.32 kg/(m^2) as calculated from the following:    Height as of this encounter: 5' 2\" (1.575 m).    Weight as of this encounter: 215 lb (97.5 kg).  Medication Reconciliation: complete     Angelique Ragsdale LPN      "

## 2017-04-28 NOTE — PATIENT INSTRUCTIONS
Thank you for allowing Dr. Casillas and our ENT team to participate in your care.  If you have a scheduling or an appointment question please contact [facility] Unit Coordinator at their direct line [phone].   ALL nursing questions or concerns can be directed to your ENT nurse at: [phone] Sima Corina    Follow up in surgery for a Septoplasty and Turbinate Reduction        HOW TO PREPARE-      You need to have a scheduled Pre-Op with your primary care physician within 30 days of your scheduled surgery. You should be set up with this before you leave today.       You need a friend or family member available to drive you home AND stay with you for 24 hours after you leave the hospital. You will not be allowed to drive yourself. IF you need to take a taxi or the bus you MUST have a responsible person to ride with you. YOUR PROCEDURE WILL BE CANCELLED IF YOU DO NOT HAVE A RESPONSIBLE ADULT TO DRIVE YOU HOME.       You CANNOT have anything to eat or drink after midnight the night before your surgery, including water and coffee. Your stomach needs to be completely empty. Do NOT chew gum, suck on hard candy, or smoke. You can brush your teeth the morning of surgery.       You need to call our Surgery Education Nurses 1-2 weeks prior to your surgery date at  [phone] or toll free [phone]. Please have your medication and allergy lists ready.      Stop your aspirin or other NSAIDs(Ibuprofen, Motrin, Aleve, Celebrex, Naproxen, etc...) 7 days before your surgery.      Hospital admitting will call you the day before your surgery with your exact arrival time.       Please call your primary care physician if you should become ill within 24 hours of scheduled surgery. (ex.vomiting, diarrhea, fever)          You will need to wash the night before AND the morning of you procedure with a liquid antibacterial soap, like Dial.

## 2017-04-28 NOTE — MR AVS SNAPSHOT
After Visit Summary   4/28/2017    Kamila Ma    MRN: [id number]           Patient Information     Date Of Birth          1995        Visit Information        Provider Department      4/28/2017 1:15 PM Gina Casillas MD [facility]        Care Instructions    Thank you for allowing Dr. Casillas and our ENT team to participate in your care.  If you have a scheduling or an appointment question please contact [facility] Unit Coordinator at their direct line [phone].   ALL nursing questions or concerns can be directed to your ENT nurse at: [phone] - Corina    Follow up in surgery for a Septoplasty and Turbinate Reduction        HOW TO PREPARE-      You need to have a scheduled Pre-Op with your primary care physician within 30 days of your scheduled surgery. You should be set up with this before you leave today.       You need a friend or family member available to drive you home AND stay with you for 24 hours after you leave the hospital. You will not be allowed to drive yourself. IF you need to take a taxi or the bus you MUST have a responsible person to ride with you. YOUR PROCEDURE WILL BE CANCELLED IF YOU DO NOT HAVE A RESPONSIBLE ADULT TO DRIVE YOU HOME.       You CANNOT have anything to eat or drink after midnight the night before your surgery, including water and coffee. Your stomach needs to be completely empty. Do NOT chew gum, suck on hard candy, or smoke. You can brush your teeth the morning of surgery.       You need to call our Surgery Education Nurses 1-2 weeks prior to your surgery date at  [phone] or toll free [phone]. Please have your medication and allergy lists ready.      Stop your aspirin or other NSAIDs(Ibuprofen, Motrin, Aleve, Celebrex, Naproxen, etc...) 7 days before your surgery.      Hospital admitting will call you the day before your surgery with your exact arrival time.       Please call your primary care physician  "if you should become ill within 24 hours of scheduled surgery. (ex.vomiting, diarrhea, fever)          You will need to wash the night before AND the morning of you procedure with a liquid antibacterial soap, like Dial.             Follow-ups after your visit        Who to contact     If you have questions or need follow up information about today's clinic visit or your schedule please contact [facility] directly at [phone].  Normal or non-critical lab and imaging results will be communicated to you by GoYoDeohart, letter or phone within 4 business days after the clinic has received the results. If you do not hear from us within 7 days, please contact the clinic through GoYoDeohart or phone. If you have a critical or abnormal lab result, we will notify you by phone as soon as possible.  Submit refill requests through [employer] or call your pharmacy and they will forward the refill request to us. Please allow 3 business days for your refill to be completed.          Additional Information About Your Visit        [employer] Information     [employer] lets you send messages to your doctor, view your test results, renew your prescriptions, schedule appointments and more. To sign up, go to [email or web address] . Click on \"Log in\" on the left side of the screen, which will take you to the Welcome page. Then click on \"Sign up Now\" on the right side of the page.     You will be asked to enter the access code listed below, as well as some personal information. Please follow the directions to create your username and password.     Your access code is: [email or web address]  Expires: 2017  3:34 PM     Your access code will  in 90 days. If you need help or a new code, please call your [place] clinic or [phone].        Care EveryWhere ID     This is your Care EveryWhere ID. This could be used by other organizations to access your [place] medical records  [id number]        Your Vitals Were     Pulse Temperature " "Height Pulse Oximetry BMI (Body Mass Index)       88 97.9  F (36.6  C) (Tympanic) 5' 2\" (1.575 m) 98% 39.32 kg/m2        Blood Pressure from Last 3 Encounters:   04/28/17 110/66   03/01/17 116/70   02/07/17 116/74    Weight from Last 3 Encounters:   04/28/17 215 lb (97.5 kg)   03/01/17 225 lb (102.1 kg)   02/07/17 210 lb (95.3 kg)              Today, you had the following     No orders found for display       Primary Care Provider Office Phone # Fax #    Sol Kim [phone]       [facility] [place] [address] [place] [address]        Thank you!     Thank you for choosing [facility] [place]  for your care. Our goal is always to provide you with excellent care. Hearing back from our patients is one way we can continue to improve our services. Please take a few minutes to complete the written survey that you may receive in the mail after your visit with us. Thank you!             Your Updated Medication List - Protect others around you: Learn how to safely use, store and throw away your medicines at www.disposemymeds.org.          This list is accurate as of: 4/28/17  1:41 PM.  Always use your most recent med list.                   Brand Name Dispense Instructions for use    ferrous sulfate 325 (65 FE) MG tablet    IRON    60 tablet    Take 1 tablet (325 mg) by mouth 2 times daily       FLINTSTONES COMPLETE PO      Take 1 tablet by mouth daily.       VIACTIV PO      Take 1 tablet by mouth daily.         "

## 2017-04-28 NOTE — PROGRESS NOTES
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 due to results from CT. There was a dehiscent right jugular bulb, Crouzon's Dz, MHL; they did not recommend further intervention.       Physical Exam  /66 (BP Location: Right arm, Patient Position: Chair, Cuff Size: Adult Regular)  Pulse 88  Temp 97.9  F (36.6  C) (Tympanic)  Ht 5' 2\" (1.575 m)  Wt 215 lb (97.5 kg)  SpO2 98%  BMI 39.32 kg/m2  General - The patient is well nourished and well developed, and appears to have good nutritional status.  Alert and oriented to person and place, interactive.  Crouzon's facies with low deep nasion, hypertelorism, proptosis, midface hypoplasia  Head and Face - Normocephalic and atraumatic, with no gross asymmetry noted of the contour of the facial features.  The facial nerve is intact, with strong symmetric movements.  Neck-no palpable lymphadenopathy or thyroid mass.  Trachea is midline.  Eyes - Extraocular movements intact.   Ears- External auditory canals are patent  Right-TM with bluish hue inferior quadrant, no effusion, TM intact without worrisome retractions  Left-TM with mild anterior retraction, areas of myringosclerosis, no effusions or worrisome retractions  Nose - Nasal mucosa is pink " and moist with no abnormal mucus.  Deep nasion, low nasal bridge with some irregularity/deviation right at dorsum, good caudal septal support by bimanual palpation  The septum was deviated right caudal throughout mid septum with contact, septal spur L  Bilateral inferior turbinate hypertrophy   Mouth - Examination of the oral cavity shows pink, healthy, moist mucosa.  No lesions or ulceration noted.  The dentition are in good repair.  The tongue is mobile and midline.  Throat - The walls of the oropharynx were smooth, pink, moist, symmetric, and had no lesions or ulcerations.  The tonsillar pillars and soft palate were symmetric.  The uvula was midline on elevation.  Fossa clear     To further evaluate the nasal cavity, I performed rigid nasal endoscopy.  I first sprayed the nasal cavity bilaterally with a mix of lidocaine and neosynephrine.  I then began on the left side using a 2.7mm, 30 degree rigid nasal endoscope.  The septum was deflected to the Right and the nasal airway was very tight.  The r middle turbinate and middle meatus were difficult to visualize due to septal deviation, but there is no guru purulence or polyposis.   I then turned my attention to the contralateral side. The middle turbinate and middle meatus were clearly visualized and without polyp or purulent secretions.   I was able to get limited look at the posterior nasal cavity and sphenoethmoid recess.  There were no excess secretions or polypoid degeneration of the mucosa.    Scant secretions debrided along right IM with 8 ledbetter    CT sinsues reviewed with Kamila:     DNS  Severe  turbinate hypertrophy   There is opacification in the AE bilaterally, narrowed osteomeatal complex bilaterally  Keros 3, low thick skull base  Position lateral nasal wall is asymmetric/turbinates asymmetric     Impression/Plan  Kamila Ma is a 22 year old female    ICD-10-CM    1. DNS (deviated nasal septum) J34.2 sodium chloride (OCEAN) 0.65 % nasal  spray   2. Nasal turbinate hypertrophy J34.3 fluticasone (FLONASE) 50 MCG/ACT spray   3. Chronic ethmoidal sinusitis J32.2    4. 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 MHL (mixed hearing loss) H90.8      Risks and complications of septoplasty and turbinate reduction were discussed include general anesthesia, bleeding, infection, septal perforation, anosmia, epiphora, CSF rhinorrhea, atrophic rhinitis, scar formation, numbness over the incision, need for additional surgery and no guarantee is made that the septum will be completely straight.  Understanding is expressed, all questions are answered and wishes are to proceed with surgical intervention.  I plan on using an endoscope    I told Kamila specifically that she is at higher risk of septal perforation and synechiae due to juvenile shape of nose, and asymmetry at lateral nasal wall although I do not feel these risks preclude surgery which could offer her a good improvement in nasal airway  She agrees    No history of recurrent sinusitis, expect ethmoids to clear postoperatively    Start ocean spray and flonase now    Annual audio and ear exam with [place]    Total exam time was over 40 minutes with over 25 minutes of this time spent in direct patient education, counseling and coordination of care with chart review, audio reviews.  We discussed the importance of nasal saline use to prevent nasal secretion stasis, the correct use of nasal steroids, and nasal and sinus anatomy were reviewed.        Gina Casillas D.O.  Otolaryngology/Head and Neck Surgery  Allergy

## 2017-05-17 ENCOUNTER — TELEPHONE (OUTPATIENT)
Dept: AUDIOLOGY | Facility: OTHER | Age: 22
End: 2017-05-17

## 2017-05-17 ENCOUNTER — OFFICE VISIT (OUTPATIENT)
Dept: AUDIOLOGY | Facility: OTHER | Age: 22
End: 2017-05-17
Attending: AUDIOLOGIST
Payer: COMMERCIAL

## 2017-05-17 DIAGNOSIS — H90.6 MIXED HEARING LOSS, BILATERAL: Primary | ICD-10-CM

## 2017-05-17 PROCEDURE — V5257 HEARING AID, DIGIT, MON, BTE: HCPCS | Mod: NU | Performed by: AUDIOLOGIST

## 2017-05-17 NOTE — MR AVS SNAPSHOT
After Visit Summary   5/17/2017    Kamila Ma    MRN: [id number]           Patient Information     Date Of Birth          1995        Visit Information        Provider Department      5/17/2017 11:45 AM Maria R Emmanuel AuD [facility]        Today's Diagnoses     Mixed hearing loss, bilateral    -  1       Follow-ups after your visit        Your next 10 appointments already scheduled     May 17, 2017 11:45 AM CDT   (Arrive by 11:30 AM)   Aid Fitting with Adrian Sloan   [facility] [place] ([facility] [place] [facility])    [address]  [place] [address]   [phone]            Jun 09, 2017 11:00 AM CDT   (Arrive by 10:45 AM)   Pre-Op physical with Sol Kim MD   [facility] [place] ([facility] [place] [facility])    [address]  [place] [address]   [phone]            Jun 13, 2017   Procedure with Gina Casillas MD   [facility] ([facility] )    [address] [place] [address]  [place] [address] [phone]            Jun 20, 2017  8:15 AM CDT   (Arrive by 8:00 AM)   Post Op with India Raymundo PA-C   [facility] [place] ([facility] [place] [facility])    [address]  [place] [address]   [phone]            Jul 06, 2017  1:15 PM CDT   (Arrive by 1:00 PM)   Return Visit with Gina Casillas MD   [facility] [place] ([facility] [place] [facility])    [address]  [place] [address]   [phone]              Who to contact     If you have questions or need follow up information about today's clinic visit or your schedule please contact [facility] directly at [phone].  Normal or non-critical lab and imaging results will be communicated to you by MyChart, letter or phone within 4 business days after the clinic has received the results. If you do not hear from us within 7 days, please contact the clinic through MyChart or phone. If you have a critical or abnormal lab result, we will notify you by phone as soon  "as possible.  Submit refill requests through [email or web address] or call your pharmacy and they will forward the refill request to us. Please allow 3 business days for your refill to be completed.          Additional Information About Your Visit        [employer] Information     [email or web address] lets you send messages to your doctor, view your test results, renew your prescriptions, schedule appointments and more. To sign up, go to [email or web address] . Click on \"Log in\" on the left side of the screen, which will take you to the Welcome page. Then click on \"Sign up Now\" on the right side of the page.     You will be asked to enter the access code listed below, as well as some personal information. Please follow the directions to create your username and password.     Your access code is: [email or web address]  Expires: 2017  3:34 PM     Your access code will  in 90 days. If you need help or a new code, please call your [facility] clinic or [phone].        Care EveryWhere ID     This is your Care EveryWhere ID. This could be used by other organizations to access your [facility] medical records  [id number]         Blood Pressure from Last 3 Encounters:   17 110/66   17 116/70   17 116/74    Weight from Last 3 Encounters:   17 215 lb (97.5 kg)   17 225 lb (102.1 kg)   17 210 lb (95.3 kg)              Today, you had the following     No orders found for display       Primary Care Provider Office Phone # Fax #    Sol Kim [phone]       [facility] [place] [address]  [facility] [address]        Thank you!     Thank you for choosing [facility] [place]  for your care. Our goal is always to provide you with excellent care. Hearing back from our patients is one way we can continue to improve our services. Please take a few minutes to complete the written survey that you may receive in the mail after your visit with us. Thank you!             Your Updated " Medication List - Protect others around you: Learn how to safely use, store and throw away your medicines at www.disposemymeds.org.          This list is accurate as of: 5/17/17 10:52 AM.  Always use your most recent med list.                   Brand Name Dispense Instructions for use    ferrous sulfate 325 (65 FE) MG tablet    IRON    60 tablet    Take 1 tablet (325 mg) by mouth 2 times daily       FLINTSTONES COMPLETE PO      Take 1 tablet by mouth daily.       fluticasone 50 MCG/ACT spray    FLONASE    16 g    Spray 2 sprays into both nostrils daily       sodium chloride 0.65 % nasal spray    OCEAN    1 Bottle    Spray 2 sprays in nostril 4 times daily       VIACTIV PO      Take 1 tablet by mouth daily.

## 2017-05-17 NOTE — TELEPHONE ENCOUNTER
Called patient today and left a message to pickup hearing aids that we have had in our hearing aid cabinet from 3-14-17 to present. On the message I did state that aids needed to be picked up by Friday 5-19-17 or they will be sent back on Monday 5-22-17 and to pay the remaining balance due of $100.00. If the aids are returned we will keep $150.00 retaining fee and reimburse third party the remaining.

## 2017-05-17 NOTE — PROGRESS NOTES
AUDIOLOGY REPORT    BACKGROUND INFORMATION: aKmila Ma reports she does not have her new right earmold over the past 7 weeks. Last visit 3/22/17 and right hearing aid has been here since then and needs to be returned for credit soon. Support staff spoke with her and it was reported that she doesn't know when she will get the new earmold but ear impression was taken last week. She would like her hearing aid without the earmold being fitted to her new hearing aid. On ear verification measures previously discussed and need for earmold. She is an experienced hearing aid wearer and test-box verification and programming provided today due to patient request.    [employer] Stride P 800 BEHIND-THE-EAR for the right ear #[id number] with repair and loss and damage warranty expiration: 4/7/2020.    TEST RESULTS AND PROCEDURES:  Hearing aid programmed using verification measurements for test box measurement. This includes  measurements matching NAL-NL 2 target with soft sounds audible, moderate sounds comfortable and clear, and loud sounds below discomfort. Manual provided to patient along with cleaning tools, earmold user guide, and hearing aid cases.    SUMMARY AND RECOMMENDATIONS: Trial period reviewed and to return no later than 45 days from today for the payor to receive a credit minus cancellation fee. She will  the aid today.  The patient will return within 45 days if any concerns. If she chooses not to come in, the aid will need to be returned to the vendor for credit Monday 21st..  Call this clinic with questions regarding today s visit.    Maria R Emmanuel M.S., [facility]  Audiologist #[id number]

## 2017-05-18 ENCOUNTER — TELEPHONE (OUTPATIENT)
Dept: AUDIOLOGY | Facility: OTHER | Age: 22
End: 2017-05-18

## 2017-05-18 NOTE — TELEPHONE ENCOUNTER
Patient picked up hearing aid today (Right) and paid the remaining balance of $100.00. Copy of Purchase Agreement given to patient with warranties/45 day trial period.

## 2017-05-25 ENCOUNTER — OFFICE VISIT (OUTPATIENT)
Dept: FAMILY MEDICINE | Facility: OTHER | Age: 22
End: 2017-05-25
Attending: FAMILY MEDICINE
Payer: COMMERCIAL

## 2017-05-25 VITALS
TEMPERATURE: 99.3 F | HEART RATE: 88 BPM | WEIGHT: 218 LBS | OXYGEN SATURATION: 98 % | SYSTOLIC BLOOD PRESSURE: 118 MMHG | RESPIRATION RATE: 17 BRPM | BODY MASS INDEX: 39.87 KG/M2 | DIASTOLIC BLOOD PRESSURE: 70 MMHG

## 2017-05-25 DIAGNOSIS — J06.9 VIRAL URI WITH COUGH: Primary | ICD-10-CM

## 2017-05-25 PROCEDURE — 99213 OFFICE O/P EST LOW 20 MIN: CPT | Performed by: FAMILY MEDICINE

## 2017-05-25 ASSESSMENT — PAIN SCALES - GENERAL: PAINLEVEL: NO PAIN (0)

## 2017-05-25 NOTE — PROGRESS NOTES
SUBJECTIVE:                                                    Kamila Ma is a 22 year old female who presents to clinic today for the following health issues:        RESPIRATORY SYMPTOMS      Duration: last night     Description  nasal congestion, rhinorrhea, sore throat, cough, fever and chills    Severity: mild    Accompanying signs and symptoms: None    History (predisposing factors):  none    Precipitating or alleviating factors: None    Therapies tried and outcome:  nyquil     Having sinus surgery June 13th    Problem list and histories reviewed & adjusted, as indicated.  Additional history: as documented    Current Outpatient Prescriptions   Medication Sig Dispense Refill     sodium chloride (OCEAN) 0.65 % nasal spray Spray 2 sprays in nostril 4 times daily 1 Bottle prn     fluticasone (FLONASE) 50 MCG/ACT spray Spray 2 sprays into both nostrils daily 16 g 12     ferrous sulfate (IRON) 325 (65 FE) MG tablet Take 1 tablet (325 mg) by mouth 2 times daily 60 tablet 1     Pediatric Multivit-Minerals-C (FLINTSTONES COMPLETE PO) Take 1 tablet by mouth daily.        Calcium-Vitamin D-Vitamin K (VIACTIV PO) Take 1 tablet by mouth daily.        Labs reviewed in EPIC    ROS:  Constitutional, HEENT, cardiovascular, pulmonary, gi and gu systems are negative, except as otherwise noted.    OBJECTIVE:                                                    /70  Pulse 88  Temp 99.3  F (37.4  C)  Resp 17  Wt 218 lb (98.9 kg)  SpO2 98%  BMI 39.87 kg/m2  Body mass index is 39.87 kg/(m^2).  GENERAL APPEARANCE: healthy, alert and no distress  HENT: ear canals and TM's normal, nose and mouth without ulcers or lesions and TM fluid bilateral, PND noted as well  NECK: no adenopathy, no asymmetry, masses, or scars and thyroid normal to palpation  RESP: lungs clear to auscultation - no rales, rhonchi or wheezes  CV: regular rates and rhythm, normal S1 S2, no S3 or S4 and no murmur, click or rub  PSYCH: mentation  appears normal and affect normal/bright       ASSESSMENT/PLAN:                                                    Viral uri with cough  -- conservative measures discussed    Patient was agreeable to this plan and had no further questions.  See Patient Instructions    Sol Kim MD  [facility]

## 2017-05-25 NOTE — MR AVS SNAPSHOT
After Visit Summary   5/25/2017    Kamila Ma    MRN: [id number]           Patient Information     Date Of Birth          1995        Visit Information        Provider Department      5/25/2017 10:45 AM Sol Kim MD [facility]        Today's Diagnoses     Viral URI with cough    -  1       Follow-ups after your visit        Your next 10 appointments already scheduled     Jun 09, 2017 11:00 AM CDT   (Arrive by 10:45 AM)   Pre-Op physical with Sol Kim MD   [facility] [place] ([facility] [place] [facility])    [address]  [place] [address]   [phone]            Jun 13, 2017   Procedure with Gina Casillas MD   [facility] ([facility] )    [address] [place] [address]  [place] [address] [phone]            Jun 20, 2017  8:15 AM CDT   (Arrive by 8:00 AM)   Post Op with India Raymundo PA-C   [facility] ([facility] [place] [facility])    [address]  [place] [address]   [phone]            Jul 06, 2017  1:15 PM CDT   (Arrive by 1:00 PM)   Return Visit with Gina Casillas MD   [facility] ([facility] [place] [facility])    [address]  [place] [address]   [phone]              Who to contact     If you have questions or need follow up information about today's clinic visit or your schedule please contact [facility] directly at [phone].  Normal or non-critical lab and imaging results will be communicated to you by MyChart, letter or phone within 4 business days after the clinic has received the results. If you do not hear from us within 7 days, please contact the clinic through [employer] or phone. If you have a critical or abnormal lab result, we will notify you by phone as soon as possible.  Submit refill requests through [employer] or call your pharmacy and they will forward the refill request to us. Please allow 3 business days for your refill to be completed.          Additional  "Information About Your Visit        MyChart Information     [employer] lets you send messages to your doctor, view your test results, renew your prescriptions, schedule appointments and more. To sign up, go to [email or web address] . Click on \"Log in\" on the left side of the screen, which will take you to the Welcome page. Then click on \"Sign up Now\" on the right side of the page.     You will be asked to enter the access code listed below, as well as some personal information. Please follow the directions to create your username and password.     Your access code is: [email or web address]  Expires: 2017  3:34 PM     Your access code will  in 90 days. If you need help or a new code, please call your [facility] clinic or [phone].        Care EveryWhere ID     This is your Care EveryWhere ID. This could be used by other organizations to access your [facility] medical records  [id number]        Your Vitals Were     Pulse Temperature Respirations Pulse Oximetry BMI (Body Mass Index)       88 99.3  F (37.4  C) 17 98% 39.87 kg/m2        Blood Pressure from Last 3 Encounters:   17 118/70   17 110/66   17 116/70    Weight from Last 3 Encounters:   17 218 lb (98.9 kg)   17 215 lb (97.5 kg)   17 225 lb (102.1 kg)              Today, you had the following     No orders found for display       Primary Care Provider Office Phone # Fax #    Sol Kim [phone]       [facility] [place] [address]  [place] [address]        Thank you!     Thank you for choosing [facility] [place]  for your care. Our goal is always to provide you with excellent care. Hearing back from our patients is one way we can continue to improve our services. Please take a few minutes to complete the written survey that you may receive in the mail after your visit with us. Thank you!             Your Updated Medication List - Protect others around you: Learn how to safely use, " store and throw away your medicines at www.disposemymeds.org.          This list is accurate as of: 5/25/17 11:10 AM.  Always use your most recent med list.                   Brand Name Dispense Instructions for use    ferrous sulfate 325 (65 FE) MG tablet    IRON    60 tablet    Take 1 tablet (325 mg) by mouth 2 times daily       FLINTSTONES COMPLETE PO      Take 1 tablet by mouth daily.       fluticasone 50 MCG/ACT spray    FLONASE    16 g    Spray 2 sprays into both nostrils daily       sodium chloride 0.65 % nasal spray    OCEAN    1 Bottle    Spray 2 sprays in nostril 4 times daily       VIACTIV PO      Take 1 tablet by mouth daily.

## 2017-05-25 NOTE — NURSING NOTE
"Chief Complaint   Patient presents with     URI       Initial /70  Pulse 88  Temp 99.3  F (37.4  C)  Resp 17  Wt 218 lb (98.9 kg)  SpO2 98%  BMI 39.87 kg/m2 Estimated body mass index is 39.87 kg/(m^2) as calculated from the following:    Height as of 4/28/17: 5' 2\" (1.575 m).    Weight as of this encounter: 218 lb (98.9 kg).  Medication Reconciliation: complete  "

## 2017-06-09 ENCOUNTER — OFFICE VISIT (OUTPATIENT)
Dept: FAMILY MEDICINE | Facility: OTHER | Age: 22
End: 2017-06-09
Attending: FAMILY MEDICINE
Payer: COMMERCIAL

## 2017-06-09 VITALS
HEART RATE: 68 BPM | DIASTOLIC BLOOD PRESSURE: 70 MMHG | HEIGHT: 64 IN | WEIGHT: 223 LBS | BODY MASS INDEX: 38.07 KG/M2 | SYSTOLIC BLOOD PRESSURE: 112 MMHG

## 2017-06-09 DIAGNOSIS — Z01.818 PREOP GENERAL PHYSICAL EXAM: ICD-10-CM

## 2017-06-09 DIAGNOSIS — Z98.84 BARIATRIC SURGERY STATUS: ICD-10-CM

## 2017-06-09 DIAGNOSIS — Z01.810 PRE-OPERATIVE CARDIOVASCULAR EXAMINATION: Primary | ICD-10-CM

## 2017-06-09 LAB
ERYTHROCYTE [DISTWIDTH] IN BLOOD BY AUTOMATED COUNT: 15.9 % (ref 10–15)
HCT VFR BLD AUTO: 34.8 % (ref 35–47)
HGB BLD-MCNC: 10.8 G/DL (ref 11.7–15.7)
MCH RBC QN AUTO: 23.3 PG (ref 26.5–33)
MCHC RBC AUTO-ENTMCNC: 31 G/DL (ref 31.5–36.5)
MCV RBC AUTO: 75 FL (ref 78–100)
PLATELET # BLD AUTO: 408 10E9/L (ref 150–450)
RBC # BLD AUTO: 4.63 10E12/L (ref 3.8–5.2)
WBC # BLD AUTO: 7.2 10E9/L (ref 4–11)

## 2017-06-09 PROCEDURE — 36415 COLL VENOUS BLD VENIPUNCTURE: CPT | Performed by: FAMILY MEDICINE

## 2017-06-09 PROCEDURE — 85027 COMPLETE CBC AUTOMATED: CPT | Performed by: FAMILY MEDICINE

## 2017-06-09 PROCEDURE — 99214 OFFICE O/P EST MOD 30 MIN: CPT | Performed by: FAMILY MEDICINE

## 2017-06-09 ASSESSMENT — PAIN SCALES - GENERAL: PAINLEVEL: NO PAIN (0)

## 2017-06-09 NOTE — MR AVS SNAPSHOT
After Visit Summary   6/9/2017    Kamila Ma    MRN: [id number]           Patient Information     Date Of Birth          1995        Visit Information        Provider Department      6/9/2017 11:00 AM Sol Kim MD [facility] [place]        Today's Diagnoses     Pre-operative cardiovascular examination    -  1    Preop general physical exam        Bariatric surgery status          Care Instructions      Before Your Surgery      Call your surgeon if there is any change in your health. This includes signs of a cold or flu (such as a sore throat, runny nose, cough, rash or fever).    Do not smoke, drink alcohol or take over the counter medicine (unless your surgeon or primary care doctor tells you to) for the 24 hours before and after surgery.    If you take prescribed drugs: Follow your doctor s orders about which medicines to take and which to stop until after surgery.    Eating and drinking prior to surgery: follow the instructions from your surgeon    Take a shower or bath the night before surgery. Use the soap your surgeon gave you to gently clean your skin. If you do not have soap from your surgeon, use your regular soap. Do not shave or scrub the surgery site.  Wear clean pajamas and have clean sheets on your bed.           Follow-ups after your visit        Your next 10 appointments already scheduled     Jun 13, 2017   Procedure with Gina Casillas MD   [facility] ([facility] )    [address] [place] [id number]   [phone]            Jun 20, 2017  8:15 AM CDT   (Arrive by 8:00 AM)   Post Op with India Raymundo PA-C   [facility]    [address] [place] [address]   [phone]            Jul 06, 2017  1:15 PM CDT   (Arrive by 1:00 PM)   Return Visit with Gina Casillas MD   [facility])    [address]  [facility] [address]   [phone]             "  Future tests that were ordered for you today     Open Future Orders        Priority Expected Expires Ordered    Vitamin B12 Routine 2017            Who to contact     If you have questions or need follow up information about today's clinic visit or your schedule please contact [facility] directly at [phone].  Normal or non-critical lab and imaging results will be communicated to you by MyChart, letter or phone within 4 business days after the clinic has received the results. If you do not hear from us within 7 days, please contact the clinic through [employer]Not[employer] or phone. If you have a critical or abnormal lab result, we will notify you by phone as soon as possible.  Submit refill requests through [employer] or call your pharmacy and they will forward the refill request to us. Please allow 3 business days for your refill to be completed.          Additional Information About Your Visit        MyChart Information     [employer] lets you send messages to your doctor, view your test results, renew your prescriptions, schedule appointments and more. To sign up, go to [email or web address] . Click on \"Log in\" on the left side of the screen, which will take you to the Welcome page. Then click on \"Sign up Now\" on the right side of the page.     You will be asked to enter the access code listed below, as well as some personal information. Please follow the directions to create your username and password.     Your access code is: [email or web address]  Expires: 2017  3:34 PM     Your access code will  in 90 days. If you need help or a new code, please call your [facility] clinic or [phone].        Care EveryWhere ID     This is your Care EveryWhere ID. This could be used by other organizations to access your [facility] medical records  [id number]        Your Vitals Were     Pulse Height BMI (Body Mass Index)             68 5' 3.75\" (1.619 m) 38.58 kg/m2          Blood Pressure from " Last 3 Encounters:   06/09/17 112/70   05/25/17 118/70   04/28/17 110/66    Weight from Last 3 Encounters:   06/09/17 223 lb (101.2 kg)   05/25/17 218 lb (98.9 kg)   04/28/17 215 lb (97.5 kg)              We Performed the Following     CBC with platelets          Today's Medication Changes          These changes are accurate as of: 6/9/17 11:28 AM.  If you have any questions, ask your nurse or doctor.               Stop taking these medicines if you haven't already. Please contact your care team if you have questions.     sodium chloride 0.65 % nasal spray   Commonly known as:  OCEAN   Stopped by:  Sol Kim MD                    Primary Care Provider Office Phone # Fax #    Sol Kim [phone]       [facility] [place] [address]  [place] [address]        Thank you!     [facility] you [facility]  [facility] your care. Our goal is always to provide you with excellent care. Hearing back from our patients is one way we can continue to improve our services. Please take a few minutes to complete the written survey that you may receive in the mail after your visit with us. Thank you!             Your Updated Medication List - Protect others around you: Learn how to safely use, store and throw away your medicines at www.disposemymeds.org.          This list is accurate as of: 6/9/17 11:28 AM.  Always use your most recent med list.                   Brand Name Dispense Instructions for use    ferrous sulfate 325 (65 FE) MG tablet    IRON    60 tablet    Take 1 tablet (325 mg) by mouth 2 times daily       FLINTSTONES COMPLETE PO      Take 1 tablet by mouth daily.       VIACTIV PO      Take 1 tablet by mouth daily.

## 2017-06-09 NOTE — NURSING NOTE
"Chief Complaint   Patient presents with     Pre-Op Exam       Initial /70  Pulse 68  Ht 5' 3.75\" (1.619 m)  Wt 223 lb (101.2 kg)  BMI 38.58 kg/m2 Estimated body mass index is 38.58 kg/(m^2) as calculated from the following:    Height as of this encounter: 5' 3.75\" (1.619 m).    Weight as of this encounter: 223 lb (101.2 kg).  Medication Reconciliation: complete   Madhavi Bennett    "

## 2017-06-09 NOTE — PROGRESS NOTES
[facility] [place]  [address] [place] [address]  [phone]  Dept: [phone]    PRE-OP EVALUATION:  Today's date: 2017    Kamila Ma (: 1995) presents for pre-operative evaluation assessment as requested by Dr. Casillas.  She requires evaluation and anesthesia risk assessment prior to undergoing surgery/procedure for treatment of sinus problems .  Proposed procedure: septoplasty, turbinoplasty    Date of Surgery/ Procedure: 17  Time of Surgery/ Procedure: unknown  Hospital[facility]Surgical Facility[facility]  Primary Physician: Sol Kim  Type of Anesthesia Anticipated: to be determined    Patient has a Health Care Directive or Living Will:  NO    1. NO - Do you have a history of heart attack, stroke, stent, bypass or surgery on an artery in the head, neck, heart or legs?  2. NO - Do you ever have any pain or discomfort in your chest?  3. NO - Do you have a history of  Heart Failure?  4. NO - Are you troubled by shortness of breath when: walking on the level, up a slight hill or at night?  5. NO - Do you currently have a cold, bronchitis or other respiratory infection?  6. NO - Do you have a cough, shortness of breath or wheezing?  7. NO - Do you sometimes get pains in the calves of your legs when you walk?  8. NO - Do you or anyone in your family have previous history of blood clots?  9. NO - Do you or does anyone in your family have a serious bleeding problem such as prolonged bleeding following surgeries or cuts?  10. YES - HAVE YOU EVER HAD PROBLEMS WITH ANEMIA OR BEEN TOLD TO TAKE IRON PILLS? Currently taking iron  11. NO - Have you had any abnormal blood loss such as black, tarry or bloody stools, or abnormal vaginal bleeding?  12. YES - HAVE YOU EVER HAD A BLOOD TRANSFUSION? As a baby  13. NO - Have you or any of your relatives ever had problems with anesthesia?  14. NO - Do you have sleep apnea, excessive snoring or daytime drowsiness?  15. NO - Do you have any  prosthetic heart valves?  16. NO - Do you have prosthetic joints?  17. NO - Is there any chance that you may be pregnant?      HPI:                                                      Brief HPI related to upcoming procedure: enlarged turbinates and deviated septum      See problem list for active medical problems.  Problems all longstanding and stable, except as noted/documented.  See ROS for pertinent symptoms related to these conditions.                                                                                                  .    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  Kamila Ma has no plan or code status on file. Discussed available resources and provided with information. Confirmed code status reflects current choices pending further ACP discussions.  Confirmed/documented legally designated decision makers.  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     Diarrhea 01/01/2011     Hearing loss 01/01/2011     Mixed hearing loss 12/4/2015     Multiple allergies 01/01/2011     Other iron deficiency anemia 7/18/2016     Otomycosis of right ear 09/10/2012      Vasovagal near syncope 7/18/2016     Past Surgical History:   Procedure Laterality Date     ADENOIDECTOMY       GI SURGERY      VSG      MOUTH SURGERY  8/2011    wisdom teeth extracted     PET placement       sagital suture release  6/1994     TONSILLECTOMY       Current Outpatient Prescriptions   Medication Sig Dispense Refill     sodium chloride (OCEAN) 0.65 % nasal spray Spray 2 sprays in nostril 4 times daily 1 Bottle prn     ferrous sulfate (IRON) 325 (65 FE) MG tablet Take 1 tablet (325 mg) by mouth 2 times daily 60 tablet 1     Pediatric Multivit-Minerals-C (FLINTSTONES COMPLETE PO) Take 1 tablet by mouth daily.        Calcium-Vitamin D-Vitamin K (VIACTIV PO) Take 1 tablet by mouth daily.        OTC products: None, except as noted above    Allergies   Allergen Reactions     Sulfa Drugs Hives     SULFA (SULFONAMIDE ANTIBIOTICS)     Topamax Diarrhea      Latex Allergy: NO    Social History   Substance Use Topics     Smoking status: Never Smoker     Smokeless tobacco: Never Used     Alcohol use No     History   Drug Use No       REVIEW OF SYSTEMS:                                                    C: NEGATIVE for fever, chills, change in weight  I: NEGATIVE for worrisome rashes, moles or lesions  E: NEGATIVE for vision changes or irritation  E/M: NEGATIVE for ear, mouth and throat problems  R: NEGATIVE for significant cough or SOB  B: NEGATIVE for masses, tenderness or discharge  CV: NEGATIVE for chest pain, palpitations or peripheral edema  GI: NEGATIVE for nausea, abdominal pain, heartburn, or change in bowel habits  : NEGATIVE for frequency, dysuria, or hematuria  M: NEGATIVE for significant arthralgias or myalgia  N: NEGATIVE for weakness, dizziness or paresthesias  E: NEGATIVE for temperature intolerance, skin/hair changes  H: NEGATIVE for bleeding problems  P: NEGATIVE for changes in mood or affect    EXAM:                                                    There were no vitals taken for this  visit.    GENERAL APPEARANCE: healthy, alert and no distress     EYES: EOMI, PERRL     HENT: ear canals and TM's normal and nose and mouth without ulcers or lesions     NECK: no adenopathy, no asymmetry, masses, or scars and thyroid normal to palpation     RESP: lungs clear to auscultation - no rales, rhonchi or wheezes     CV: regular rates and rhythm, normal S1 S2, no S3 or S4 and no murmur, click or rub     ABDOMEN:  soft, nontender, no HSM or masses and bowel sounds normal     MS: extremities normal- no gross deformities noted, no evidence of inflammation in joints, FROM in all extremities.     SKIN: no suspicious lesions or rashes     NEURO: Normal strength and tone, sensory exam grossly normal, mentation intact and speech normal     PSYCH: mentation appears normal. and affect normal/bright     LYMPHATICS: No axillary, cervical, or supraclavicular nodes    DIAGNOSTICS:                                                      Labs Resulted Today:   Results for orders placed or performed in visit on 06/09/17   CBC with platelets   Result Value Ref Range    WBC 7.2 4.0 - 11.0 10e9/L    RBC Count 4.63 3.8 - 5.2 10e12/L    Hemoglobin 10.8 (L) 11.7 - 15.7 g/dL    Hematocrit 34.8 (L) 35.0 - 47.0 %    MCV 75 (L) 78 - 100 fl    MCH 23.3 (L) 26.5 - 33.0 pg    MCHC 31.0 (L) 31.5 - 36.5 g/dL    RDW 15.9 (H) 10.0 - 15.0 %    Platelet Count 408 150 - 450 10e9/L       Recent Labs   Lab Test  03/01/17   0950  02/02/17   1136  10/10/16   1219   HGB  11.1*  11.4*  10.6*   PLT  321  378  357   NA   --   139  139   POTASSIUM   --   4.2  4.2   CR   --   0.74  0.67        IMPRESSION:                                                    Reason for surgery/procedure: chronic sinusitis from enlarged turbinates and deviated septum   Diagnosis/reason for consult: cardiopulmonary clearance    The proposed surgical procedure is considered LOW risk.    REVISED CARDIAC RISK INDEX  The patient has the following serious cardiovascular risks for  perioperative complications such as (MI, PE, VFib and 3  AV Block):  No serious cardiac risks  INTERPRETATION: 0 risks: Class I (very low risk - 0.4% complication rate)    The patient has the following additional risks for perioperative complications:  No identified additional risks      ICD-10-CM    1. Pre-operative cardiovascular examination Z01.810 CBC with platelets     CBC with platelets       RECOMMENDATIONS:                                                      --Consult hospital rounder / IM to assist post-op medical management    --Patient is to take all scheduled medications on the day of surgery EXCEPT for modifications listed below.    APPROVAL GIVEN to proceed with proposed procedure, without further diagnostic evaluation       Signed Electronically by: Sol Kim MD    Copy of this evaluation report is provided to requesting physician.    [facility] Preop Guidelines

## 2017-06-12 NOTE — H&P (VIEW-ONLY)
[facility] [place]  [address] [place] [address]  [phone]  Dept: [phone]    PRE-OP EVALUATION:  Today's date: 2017    Kamila Ma (: 1995) presents for pre-operative evaluation assessment as requested by Dr. Casillas.  She requires evaluation and anesthesia risk assessment prior to undergoing surgery/procedure for treatment of sinus problems .  Proposed procedure: septoplasty, turbinoplasty    Date of Surgery/ Procedure: 17  Time of Surgery/ Procedure: unknown  Hospital/Surgical Facility: [facility]  Primary Physician: Sol Kim  Type of Anesthesia Anticipated: to be determined    Patient has a Health Care Directive or Living Will:  NO    1. NO - Do you have a history of heart attack, stroke, stent, bypass or surgery on an artery in the head, neck, heart or legs?  2. NO - Do you ever have any pain or discomfort in your chest?  3. NO - Do you have a history of  Heart Failure?  4. NO - Are you troubled by shortness of breath when: walking on the level, up a slight hill or at night?  5. NO - Do you currently have a cold, bronchitis or other respiratory infection?  6. NO - Do you have a cough, shortness of breath or wheezing?  7. NO - Do you sometimes get pains in the calves of your legs when you walk?  8. NO - Do you or anyone in your family have previous history of blood clots?  9. NO - Do you or does anyone in your family have a serious bleeding problem such as prolonged bleeding following surgeries or cuts?  10. YES - HAVE YOU EVER HAD PROBLEMS WITH ANEMIA OR BEEN TOLD TO TAKE IRON PILLS? Currently taking iron  11. NO - Have you had any abnormal blood loss such as black, tarry or bloody stools, or abnormal vaginal bleeding?  12. YES - HAVE YOU EVER HAD A BLOOD TRANSFUSION? As a baby  13. NO - Have you or any of your relatives ever had problems with anesthesia?  14. NO - Do you have sleep apnea, excessive snoring or daytime drowsiness?  15. NO - Do you have any  prosthetic heart valves?  16. NO - Do you have prosthetic joints?  17. NO - Is there any chance that you may be pregnant?      HPI:                                                      Brief HPI related to upcoming procedure: enlarged turbinates and deviated septum      See problem list for active medical problems.  Problems all longstanding and stable, except as noted/documented.  See ROS for pertinent symptoms related to these conditions.                                                                                                  .    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  Kamila Ma has no plan or code status on file. Discussed available resources and provided with information. Confirmed code status reflects current choices pending further ACP discussions.  Confirmed/documented legally designated decision makers.  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     Diarrhea 01/01/2011     Hearing loss 01/01/2011     Mixed hearing loss 12/4/2015     Multiple allergies 01/01/2011     Other iron deficiency anemia 7/18/2016     Otomycosis of right ear 09/10/2012      Vasovagal near syncope 7/18/2016     Past Surgical History:   Procedure Laterality Date     ADENOIDECTOMY       GI SURGERY      VSG      MOUTH SURGERY  8/2011    wisdom teeth extracted     PET placement       sagital suture release  6/1994     TONSILLECTOMY       Current Outpatient Prescriptions   Medication Sig Dispense Refill     sodium chloride (OCEAN) 0.65 % nasal spray Spray 2 sprays in nostril 4 times daily 1 Bottle prn     ferrous sulfate (IRON) 325 (65 FE) MG tablet Take 1 tablet (325 mg) by mouth 2 times daily 60 tablet 1     Pediatric Multivit-Minerals-C (FLINTSTONES COMPLETE PO) Take 1 tablet by mouth daily.        Calcium-Vitamin D-Vitamin K (VIACTIV PO) Take 1 tablet by mouth daily.        OTC products: None, except as noted above    Allergies   Allergen Reactions     Sulfa Drugs Hives     SULFA (SULFONAMIDE ANTIBIOTICS)     Topamax Diarrhea      Latex Allergy: NO    Social History   Substance Use Topics     Smoking status: Never Smoker     Smokeless tobacco: Never Used     Alcohol use No     History   Drug Use No       REVIEW OF SYSTEMS:                                                    C: NEGATIVE for fever, chills, change in weight  I: NEGATIVE for worrisome rashes, moles or lesions  E: NEGATIVE for vision changes or irritation  E/M: NEGATIVE for ear, mouth and throat problems  R: NEGATIVE for significant cough or SOB  B: NEGATIVE for masses, tenderness or discharge  CV: NEGATIVE for chest pain, palpitations or peripheral edema  GI: NEGATIVE for nausea, abdominal pain, heartburn, or change in bowel habits  : NEGATIVE for frequency, dysuria, or hematuria  M: NEGATIVE for significant arthralgias or myalgia  N: NEGATIVE for weakness, dizziness or paresthesias  E: NEGATIVE for temperature intolerance, skin/hair changes  H: NEGATIVE for bleeding problems  P: NEGATIVE for changes in mood or affect    EXAM:                                                    There were no vitals taken for this  visit.    GENERAL APPEARANCE: healthy, alert and no distress     EYES: EOMI, PERRL     HENT: ear canals and TM's normal and nose and mouth without ulcers or lesions     NECK: no adenopathy, no asymmetry, masses, or scars and thyroid normal to palpation     RESP: lungs clear to auscultation - no rales, rhonchi or wheezes     CV: regular rates and rhythm, normal S1 S2, no S3 or S4 and no murmur, click or rub     ABDOMEN:  soft, nontender, no HSM or masses and bowel sounds normal     MS: extremities normal- no gross deformities noted, no evidence of inflammation in joints, FROM in all extremities.     SKIN: no suspicious lesions or rashes     NEURO: Normal strength and tone, sensory exam grossly normal, mentation intact and speech normal     PSYCH: mentation appears normal. and affect normal/bright     LYMPHATICS: No axillary, cervical, or supraclavicular nodes    DIAGNOSTICS:                                                      Labs Resulted Today:   Results for orders placed or performed in visit on 06/09/17   CBC with platelets   Result Value Ref Range    WBC 7.2 4.0 - 11.0 10e9/L    RBC Count 4.63 3.8 - 5.2 10e12/L    Hemoglobin 10.8 (L) 11.7 - 15.7 g/dL    Hematocrit 34.8 (L) 35.0 - 47.0 %    MCV 75 (L) 78 - 100 fl    MCH 23.3 (L) 26.5 - 33.0 pg    MCHC 31.0 (L) 31.5 - 36.5 g/dL    RDW 15.9 (H) 10.0 - 15.0 %    Platelet Count 408 150 - 450 10e9/L       Recent Labs   Lab Test  03/01/17   0950  02/02/17   1136  10/10/16   1219   HGB  11.1*  11.4*  10.6*   PLT  321  378  357   NA   --   139  139   POTASSIUM   --   4.2  4.2   CR   --   0.74  0.67        IMPRESSION:                                                    Reason for surgery/procedure: chronic sinusitis from enlarged turbinates and deviated septum   Diagnosis/reason for consult: cardiopulmonary clearance    The proposed surgical procedure is considered LOW risk.    REVISED CARDIAC RISK INDEX  The patient has the following serious cardiovascular risks for  perioperative complications such as (MI, PE, VFib and 3  AV Block):  No serious cardiac risks  INTERPRETATION: 0 risks: Class I (very low risk - 0.4% complication rate)    The patient has the following additional risks for perioperative complications:  No identified additional risks      ICD-10-CM    1. Pre-operative cardiovascular examination Z01.810 CBC with platelets     CBC with platelets       RECOMMENDATIONS:                                                      --Consult hospital rounder / IM to assist post-op medical management    --Patient is to take all scheduled medications on the day of surgery EXCEPT for modifications listed below.    APPROVAL GIVEN to proceed with proposed procedure, without further diagnostic evaluation       Signed Electronically by: Sol Kim MD    Copy of this evaluation report is provided to requesting physician.    [facility] Preop Guidelines

## 2017-06-13 ENCOUNTER — HOSPITAL ENCOUNTER (OUTPATIENT)
Facility: HOSPITAL | Age: 22
Discharge: HOME | End: 2017-06-13
Attending: OTOLARYNGOLOGY | Admitting: OTOLARYNGOLOGY
Payer: COMMERCIAL

## 2017-06-13 ENCOUNTER — ANESTHESIA EVENT (OUTPATIENT)
Dept: SURGERY | Facility: HOSPITAL | Age: 22
End: 2017-06-13
Payer: COMMERCIAL

## 2017-06-13 ENCOUNTER — SURGERY (OUTPATIENT)
Age: 22
End: 2017-06-13

## 2017-06-13 ENCOUNTER — ANESTHESIA (OUTPATIENT)
Dept: SURGERY | Facility: HOSPITAL | Age: 22
End: 2017-06-13
Payer: COMMERCIAL

## 2017-06-13 VITALS
HEART RATE: 74 BPM | DIASTOLIC BLOOD PRESSURE: 90 MMHG | SYSTOLIC BLOOD PRESSURE: 108 MMHG | RESPIRATION RATE: 16 BRPM | OXYGEN SATURATION: 100 % | TEMPERATURE: 96.1 F

## 2017-06-13 DIAGNOSIS — Z98.890 POST-OPERATIVE STATE: Primary | ICD-10-CM

## 2017-06-13 LAB — HCG UR QL: NEGATIVE

## 2017-06-13 PROCEDURE — 30520 REPAIR OF NASAL SEPTUM: CPT | Performed by: ANESTHESIOLOGY

## 2017-06-13 PROCEDURE — 25000128 H RX IP 250 OP 636: Performed by: ANESTHESIOLOGY

## 2017-06-13 PROCEDURE — 25000128 H RX IP 250 OP 636: Performed by: NURSE ANESTHETIST, CERTIFIED REGISTERED

## 2017-06-13 PROCEDURE — 30930 THER FX NASAL INF TURBINATE: CPT | Mod: 50 | Performed by: OTOLARYNGOLOGY

## 2017-06-13 PROCEDURE — 25000125 ZZHC RX 250: Performed by: OTOLARYNGOLOGY

## 2017-06-13 PROCEDURE — 81025 URINE PREGNANCY TEST: CPT | Performed by: ANESTHESIOLOGY

## 2017-06-13 PROCEDURE — 36000058 ZZH SURGERY LEVEL 3 EA 15 ADDTL MIN: Performed by: OTOLARYNGOLOGY

## 2017-06-13 PROCEDURE — 71000027 ZZH RECOVERY PHASE 2 EACH 15 MINS: Performed by: OTOLARYNGOLOGY

## 2017-06-13 PROCEDURE — 27110028 ZZH OR GENERAL SUPPLY NON-STERILE: Performed by: OTOLARYNGOLOGY

## 2017-06-13 PROCEDURE — 25000125 ZZHC RX 250: Performed by: ANESTHESIOLOGY

## 2017-06-13 PROCEDURE — 37000009 ZZH ANESTHESIA TECHNICAL FEE, EACH ADDTL 15 MIN: Performed by: OTOLARYNGOLOGY

## 2017-06-13 PROCEDURE — 01999 UNLISTED ANES PROCEDURE: CPT | Performed by: NURSE ANESTHETIST, CERTIFIED REGISTERED

## 2017-06-13 PROCEDURE — 25000125 ZZHC RX 250: Performed by: NURSE ANESTHETIST, CERTIFIED REGISTERED

## 2017-06-13 PROCEDURE — 27210794 ZZH OR GENERAL SUPPLY STERILE: Performed by: OTOLARYNGOLOGY

## 2017-06-13 PROCEDURE — 37000008 ZZH ANESTHESIA TECHNICAL FEE, 1ST 30 MIN: Performed by: OTOLARYNGOLOGY

## 2017-06-13 PROCEDURE — 30520 REPAIR OF NASAL SEPTUM: CPT | Performed by: OTOLARYNGOLOGY

## 2017-06-13 PROCEDURE — 36000056 ZZH SURGERY LEVEL 3 1ST 30 MIN: Performed by: OTOLARYNGOLOGY

## 2017-06-13 PROCEDURE — 25000132 ZZH RX MED GY IP 250 OP 250 PS 637: Performed by: OTOLARYNGOLOGY

## 2017-06-13 PROCEDURE — 25000566 ZZH SEVOFLURANE, EA 15 MIN: Performed by: ANESTHESIOLOGY

## 2017-06-13 PROCEDURE — 71000014 ZZH RECOVERY PHASE 1 LEVEL 2 FIRST HR: Performed by: OTOLARYNGOLOGY

## 2017-06-13 PROCEDURE — 40000306 ZZH STATISTIC PRE PROC ASSESS II: Performed by: OTOLARYNGOLOGY

## 2017-06-13 RX ORDER — SODIUM CHLORIDE, SODIUM LACTATE, POTASSIUM CHLORIDE, CALCIUM CHLORIDE 600; 310; 30; 20 MG/100ML; MG/100ML; MG/100ML; MG/100ML
INJECTION, SOLUTION INTRAVENOUS CONTINUOUS
Status: DISCONTINUED | OUTPATIENT
Start: 2017-06-13 | End: 2017-06-13 | Stop reason: HOSPADM

## 2017-06-13 RX ORDER — LIDOCAINE HYDROCHLORIDE 20 MG/ML
INJECTION, SOLUTION INFILTRATION; PERINEURAL PRN
Status: DISCONTINUED | OUTPATIENT
Start: 2017-06-13 | End: 2017-06-13

## 2017-06-13 RX ORDER — KETOROLAC TROMETHAMINE 30 MG/ML
30 INJECTION, SOLUTION INTRAMUSCULAR; INTRAVENOUS EVERY 6 HOURS PRN
Status: DISCONTINUED | OUTPATIENT
Start: 2017-06-13 | End: 2017-06-13 | Stop reason: HOSPADM

## 2017-06-13 RX ORDER — ONDANSETRON 2 MG/ML
4 INJECTION INTRAMUSCULAR; INTRAVENOUS EVERY 30 MIN PRN
Status: DISCONTINUED | OUTPATIENT
Start: 2017-06-13 | End: 2017-06-13 | Stop reason: HOSPADM

## 2017-06-13 RX ORDER — ALBUTEROL SULFATE 0.83 MG/ML
2.5 SOLUTION RESPIRATORY (INHALATION) EVERY 4 HOURS PRN
Status: DISCONTINUED | OUTPATIENT
Start: 2017-06-13 | End: 2017-06-13 | Stop reason: HOSPADM

## 2017-06-13 RX ORDER — FENTANYL CITRATE 50 UG/ML
25-50 INJECTION, SOLUTION INTRAMUSCULAR; INTRAVENOUS
Status: DISCONTINUED | OUTPATIENT
Start: 2017-06-13 | End: 2017-06-13 | Stop reason: HOSPADM

## 2017-06-13 RX ORDER — ECHINACEA PURPUREA EXTRACT 125 MG
TABLET ORAL
Qty: 1 BOTTLE | Status: SHIPPED | OUTPATIENT
Start: 2017-06-13 | End: 2018-08-20

## 2017-06-13 RX ORDER — SCOLOPAMINE TRANSDERMAL SYSTEM 1 MG/1
1 PATCH, EXTENDED RELEASE TRANSDERMAL ONCE
Status: COMPLETED | OUTPATIENT
Start: 2017-06-13 | End: 2017-06-13

## 2017-06-13 RX ORDER — DEXAMETHASONE SODIUM PHOSPHATE 10 MG/ML
INJECTION, SOLUTION INTRAMUSCULAR; INTRAVENOUS PRN
Status: DISCONTINUED | OUTPATIENT
Start: 2017-06-13 | End: 2017-06-13

## 2017-06-13 RX ORDER — FLUTICASONE PROPIONATE 50 MCG
2 SPRAY, SUSPENSION (ML) NASAL DAILY
Qty: 16 G | Refills: 12 | Status: SHIPPED | OUTPATIENT
Start: 2017-06-27 | End: 2017-07-27

## 2017-06-13 RX ORDER — PROPOFOL 10 MG/ML
INJECTION, EMULSION INTRAVENOUS PRN
Status: DISCONTINUED | OUTPATIENT
Start: 2017-06-13 | End: 2017-06-13

## 2017-06-13 RX ORDER — HYDRALAZINE HYDROCHLORIDE 20 MG/ML
2.5-5 INJECTION INTRAMUSCULAR; INTRAVENOUS EVERY 10 MIN PRN
Status: DISCONTINUED | OUTPATIENT
Start: 2017-06-13 | End: 2017-06-13 | Stop reason: HOSPADM

## 2017-06-13 RX ORDER — FENTANYL CITRATE 50 UG/ML
INJECTION, SOLUTION INTRAMUSCULAR; INTRAVENOUS PRN
Status: DISCONTINUED | OUTPATIENT
Start: 2017-06-13 | End: 2017-06-13

## 2017-06-13 RX ORDER — LABETALOL HYDROCHLORIDE 5 MG/ML
10 INJECTION, SOLUTION INTRAVENOUS
Status: DISCONTINUED | OUTPATIENT
Start: 2017-06-13 | End: 2017-06-13 | Stop reason: HOSPADM

## 2017-06-13 RX ORDER — ONDANSETRON 2 MG/ML
INJECTION INTRAMUSCULAR; INTRAVENOUS PRN
Status: DISCONTINUED | OUTPATIENT
Start: 2017-06-13 | End: 2017-06-13

## 2017-06-13 RX ORDER — OXYMETAZOLINE HYDROCHLORIDE 0.05 G/100ML
3 SPRAY NASAL
Status: COMPLETED | OUTPATIENT
Start: 2017-06-13 | End: 2017-06-13

## 2017-06-13 RX ORDER — DEXAMETHASONE SODIUM PHOSPHATE 4 MG/ML
4 INJECTION, SOLUTION INTRA-ARTICULAR; INTRALESIONAL; INTRAMUSCULAR; INTRAVENOUS; SOFT TISSUE EVERY 10 MIN PRN
Status: DISCONTINUED | OUTPATIENT
Start: 2017-06-13 | End: 2017-06-13 | Stop reason: HOSPADM

## 2017-06-13 RX ORDER — ONDANSETRON 4 MG/1
4 TABLET, ORALLY DISINTEGRATING ORAL EVERY 30 MIN PRN
Status: DISCONTINUED | OUTPATIENT
Start: 2017-06-13 | End: 2017-06-13 | Stop reason: HOSPADM

## 2017-06-13 RX ORDER — LIDOCAINE HYDROCHLORIDE AND EPINEPHRINE 10; 10 MG/ML; UG/ML
INJECTION, SOLUTION INFILTRATION; PERINEURAL PRN
Status: DISCONTINUED | OUTPATIENT
Start: 2017-06-13 | End: 2017-06-13 | Stop reason: HOSPADM

## 2017-06-13 RX ORDER — NALOXONE HYDROCHLORIDE 0.4 MG/ML
.1-.4 INJECTION, SOLUTION INTRAMUSCULAR; INTRAVENOUS; SUBCUTANEOUS
Status: DISCONTINUED | OUTPATIENT
Start: 2017-06-13 | End: 2017-06-13 | Stop reason: HOSPADM

## 2017-06-13 RX ORDER — ACETAMINOPHEN AND CODEINE PHOSPHATE 300; 30 MG/1; MG/1
1-2 TABLET ORAL EVERY 6 HOURS PRN
Qty: 20 TABLET | Refills: 0 | Status: SHIPPED | OUTPATIENT
Start: 2017-06-13 | End: 2017-08-10

## 2017-06-13 RX ORDER — PROMETHAZINE HYDROCHLORIDE 25 MG/ML
12.5 INJECTION, SOLUTION INTRAMUSCULAR; INTRAVENOUS
Status: DISCONTINUED | OUTPATIENT
Start: 2017-06-13 | End: 2017-06-13 | Stop reason: HOSPADM

## 2017-06-13 RX ORDER — CEPHALEXIN 500 MG/1
500 CAPSULE ORAL 3 TIMES DAILY
Qty: 21 CAPSULE | Refills: 0 | Status: SHIPPED | OUTPATIENT
Start: 2017-06-13 | End: 2017-06-20

## 2017-06-13 RX ORDER — MEPERIDINE HYDROCHLORIDE 25 MG/ML
12.5 INJECTION INTRAMUSCULAR; INTRAVENOUS; SUBCUTANEOUS
Status: DISCONTINUED | OUTPATIENT
Start: 2017-06-13 | End: 2017-06-13 | Stop reason: HOSPADM

## 2017-06-13 RX ORDER — HYDROCODONE BITARTRATE AND ACETAMINOPHEN 7.5; 325 MG/1; MG/1
1 TABLET ORAL EVERY 6 HOURS PRN
Qty: 25 TABLET | Refills: 0 | Status: SHIPPED | OUTPATIENT
Start: 2017-06-13 | End: 2017-06-13

## 2017-06-13 RX ADMIN — LIDOCAINE HYDROCHLORIDE,EPINEPHRINE BITARTRATE 12 ML: 10; .01 INJECTION, SOLUTION INFILTRATION; PERINEURAL at 14:54

## 2017-06-13 RX ADMIN — MIDAZOLAM HYDROCHLORIDE 2 MG: 1 INJECTION, SOLUTION INTRAMUSCULAR; INTRAVENOUS at 13:30

## 2017-06-13 RX ADMIN — OXYMETAZOLINE HYDROCHLORIDE 3 SPRAY: 5 SPRAY NASAL at 12:47

## 2017-06-13 RX ADMIN — ONDANSETRON 4 MG: 2 INJECTION INTRAMUSCULAR; INTRAVENOUS at 14:33

## 2017-06-13 RX ADMIN — FENTANYL CITRATE 50 MCG: 50 INJECTION, SOLUTION INTRAMUSCULAR; INTRAVENOUS at 15:10

## 2017-06-13 RX ADMIN — SODIUM CHLORIDE, POTASSIUM CHLORIDE, SODIUM LACTATE AND CALCIUM CHLORIDE: 600; 310; 30; 20 INJECTION, SOLUTION INTRAVENOUS at 14:44

## 2017-06-13 RX ADMIN — SCOPALAMINE 1 PATCH: 1 PATCH, EXTENDED RELEASE TRANSDERMAL at 12:54

## 2017-06-13 RX ADMIN — LIDOCAINE HYDROCHLORIDE 40 MG: 20 INJECTION, SOLUTION INFILTRATION; PERINEURAL at 13:37

## 2017-06-13 RX ADMIN — SODIUM CHLORIDE, POTASSIUM CHLORIDE, SODIUM LACTATE AND CALCIUM CHLORIDE: 600; 310; 30; 20 INJECTION, SOLUTION INTRAVENOUS at 13:17

## 2017-06-13 RX ADMIN — DEXAMETHASONE SODIUM PHOSPHATE 12 MG: 10 INJECTION, SOLUTION INTRAMUSCULAR; INTRAVENOUS at 13:50

## 2017-06-13 RX ADMIN — COCAINE HYDROCHLORIDE 4 ML: 40 SOLUTION TOPICAL at 14:05

## 2017-06-13 RX ADMIN — OXYMETAZOLINE HYDROCHLORIDE 3 SPRAY: 5 SPRAY NASAL at 12:56

## 2017-06-13 RX ADMIN — Medication 100 MG: at 13:37

## 2017-06-13 RX ADMIN — PROPOFOL 200 MG: 10 INJECTION, EMULSION INTRAVENOUS at 13:37

## 2017-06-13 RX ADMIN — FENTANYL CITRATE 50 MCG: 50 INJECTION, SOLUTION INTRAMUSCULAR; INTRAVENOUS at 13:53

## 2017-06-13 RX ADMIN — OXYMETAZOLINE HYDROCHLORIDE 3 SPRAY: 5 SPRAY NASAL at 13:09

## 2017-06-13 NOTE — IP AVS SNAPSHOT
MRN:[id number]                      After Visit Summary   6/13/2017    Kamila Ma    MRN: [id number]           Thank you!     Thank you for choosing [facility] for your care. Our goal is always to provide you with excellent care. Hearing back from our patients is one way we can continue to improve our services. Please take a few minutes to complete the written survey that you may receive in the mail after you visit with us. Thank you!        Patient Information     Date Of Birth          1995        About your hospital stay     You were admitted on:  June 13, 2017 You last received care in the:  [facility] Preop/Phase II    You were discharged on:  June 13, 2017       Who to Call     For medical emergencies, please call 911.  For non-urgent questions about your medical care, please call your primary care provider or clinic, [phone]  For questions related to your surgery, please call your surgery clinic        Attending Provider     Provider Specialty    Gina Casillas MD Otolaryngology       Primary Care Provider Office Phone # Fax #    Sol Kim [phone]      Your next 10 appointments already scheduled     Jun 20, 2017  8:15 AM CDT   (Arrive by 8:00 AM)   Post Op with India Raymundo PA-C   [facility] [place] ([facility] [place] [facility]    [address]  [place] [address]   [phone]            Jul 06, 2017  1:15 PM CDT   (Arrive by 1:00 PM)   Return Visit with Gina Casillas MD   [facility] ([facility] [place] )    [address]  [place] [address]   [phone]              Further instructions from your care team       Instructions for Nasal Surgery    Recovery - Everyone recovers differently from a general anesthetic.  Symptoms such as fatigue, nausea, light-headedness, and sometimes a low grade fever (up to 100 degrees) are not unusual.  As your body removes the anesthetic drugs from circulation,  these symptoms will resolve.  Your nose will be sore after surgery, and you may even have symptoms similar to a sinus infection with headache, congestion, and pressure.  These will resolve with healing.  For several days you may experience bloody drainage from the nose, please use the drip pad as necessary for this.  If there is persistent bleeding, please call the office during business hours or the on call ENT physician after hours.  There are no diet restrictions after sinus surgery, and you can resume your home medications.  Please do not blow your nose until two weeks after surgery.  Limit your activity to no strenuous activities until I see you for the first follow-up visit in approximately 2 weeks.      Medications - You were sent home with narcotic pain medication and an antibiotic.  If you can tolerate the discomfort during your recovery by using just plain Tylenol (or ibuprofen (advil) if allowed by Dr. Casillas), please do so.  However, do not hesitate to use the stronger pain medication if needed.  If you were sent home with an antibiotic, it is primarily used to help the healing process.  If it causes loose bowel movements or other signs of intolerance, it is appropriate to discontinue it.  By far the most important measure you can take to speed recovery, and maximize the chances of long term success of nasal surgery is using the sinus rinses at least three time per day for the first month after surgery.   Start ocean nasal spray tonight and use 2-3 sprays in each nostril at least 5 times daily.    1 week after surgery start using nasal steroids (flonase, nasonex, etc).      Complications - Problems related to nasal surgery almost always are detected during the operation, and special instruction will be given in that situation.  However, unexpected things can happen, and are all related to the structures around the sinus cavities.  Symptoms that should alert you to a possible problem include: severe  eye pain or eye swelling, persistent heavy bleeding from the nose, and high fevers with headache and neck pain.  Any of these symptoms should be called into my office or to the on call ENT if after hours.      Follow-up -  Follow up appointments are necessary  to aggressively manage the most common complication,  which is scar tissue blocking the nasal airway.  These visits will require the examination of your nose and possibly removal of crusts of dry mucous and blood, with possible removal of early scar tissue.  Please prepare for these visits by using your ocean spray.    See TORIE Guzman within 1-2 weeks after surgery.  See Dr. Casillas in 1 month.    If there are any questions or issues with the above, or if there are other issues that concern you, always feel free to call the clinic and I am happy to speak with you as soon as I can.    Gina Casillas D.O.  Otolaryngology/Head and Neck Surgery  Allergy    [phone] office    Remove the scopolamine patch behind your left ear after 24 hours after application.   After removing the patch, wash your hands and the area behind your ear thoroughly with soap and water.   The patch will still contain some medicine after use.   To avoid accidental contact or ingestion by children or pets, fold the used patch in half with the sticky side together and throw away in the trash out of the reach of children and pets.           Post-Anesthesia Patient Instructions    IMMEDIATELY FOLLOWING SURGERY:  Do not drive or operate machinery for the first twenty four hours after surgery.  Do not make any important decisions for twenty four hours after surgery or while taking narcotic pain medications or sedatives.  If you develop intractable nausea and vomiting or a severe headache please notify your doctor immediately.    FOLLOW-UP:  Please make an appointment with your surgeon as instructed. You do not need to follow up with anesthesia unless specifically instructed  "to do so.    WOUND CARE INSTRUCTIONS (if applicable):  Keep a dry clean dressing on the anesthesia/puncture wound site if there is drainage.  Once the wound has quit draining you may leave it open to air.  Generally you should leave the bandage intact for twenty four hours unless there is drainage.  If the epidural site drains for more than 36-48 hours please call the anesthesia department.    QUESTIONS?:  Please feel free to call your physician or the hospital  if you have any questions, and they will be happy to assist you.               Pending Results     No orders found from 2017 to 2017.            Admission Information     Date & Time Provider Department Dept. Phone    2017 Gina Casillas MD HI Preop/Phase [phone]      Your Vitals Were     Blood Pressure Pulse Temperature Respirations Pulse Oximetry       123/81 74 96.1  F (35.6  C) (Axillary) 16 98%       MyChart Information     [facility] [employer] lets you send messages to your doctor, view your test results, renew your prescriptions, schedule appointments and more. To sign up, go to [email or web address] . Click on \"Log in\" on the left side of the screen, which will take you to the Welcome page. Then click on \"Sign up Now\" on the right side of the page.     You will be asked to enter the access code listed below, as well as some personal information. Please follow the directions to create your username and password.     Your access code is: [email or web address]  Expires: 2017  3:34 PM     Your access code will  in 90 days. If you need help or a new code, please call your [facility] clinic or [phone].        Care EveryWhere ID     This is your Care EveryWhere ID. This could be used by other organizations to access your [facility] medical records  [id number]           Review of your medicines      START taking        Dose / Directions    acetaminophen-codeine 300-30 MG per tablet   Commonly known as:  TYLENOL w/CODEINE No. 3 "   Used for:  Post-operative state        Dose:  1-2 tablet   Take 1-2 tablets by mouth every 6 hours as needed for mild pain   Quantity:  20 tablet   Refills:  0       cephALEXin 500 MG capsule   Commonly known as:  KEFLEX   Used for:  Post-operative state        Dose:  500 mg   Take 1 capsule (500 mg) by mouth 3 times daily for 7 days   Quantity:  21 capsule   Refills:  0       fluticasone 50 MCG/ACT spray   Commonly known as:  FLONASE   Used for:  Post-operative state        Dose:  2 spray   Start taking on:  6/27/2017   Spray 2 sprays into both nostrils daily   Quantity:  16 g   Refills:  12       sodium chloride 0.65 % nasal spray   Commonly known as:  OCEAN   Used for:  Post-operative state        Use 3-4 sprays in each nostril every hour while awake for 2 weeks, then use at least 4 times daily for 1 month   Quantity:  1 Bottle   Refills:  prn         CONTINUE these medicines which have NOT CHANGED        Dose / Directions    ferrous sulfate 325 (65 FE) MG tablet   Commonly known as:  IRON   Used for:  Iron deficiency anemia secondary to inadequate dietary iron intake        Dose:  325 mg   Take 1 tablet (325 mg) by mouth 2 times daily   Quantity:  60 tablet   Refills:  1       FLINTSTONES COMPLETE PO        Dose:  1 tablet   Take 1 tablet by mouth daily.   Refills:  0       VIACTIV PO        Dose:  1 tablet   Take 1 tablet by mouth daily.   Refills:  0            Where to get your medicines      These medications were sent to [id number] - [place], [place] - [address] AT [facility] [address], [place] [address]     Phone:  [phone]     cephALEXin 500 MG capsule    fluticasone 50 MCG/ACT spray    sodium chloride 0.65 % nasal spray         Some of these will need a paper prescription and others can be bought over the counter. Ask your nurse if you have questions.     Bring a paper prescription for each of these medications     acetaminophen-codeine 300-30 MG per tablet                 Protect others around you: Learn how to safely use, store and throw away your medicines at www.disposemymeds.org.             Medication List: This is a list of all your medications and when to take them. Check marks below indicate your daily home schedule. Keep this list as a reference.      Medications           Morning Afternoon Evening Bedtime As Needed    acetaminophen-codeine 300-30 MG per tablet   Commonly known as:  TYLENOL w/CODEINE No. 3   Take 1-2 tablets by mouth every 6 hours as needed for mild pain                                cephALEXin 500 MG capsule   Commonly known as:  KEFLEX   Take 1 capsule (500 mg) by mouth 3 times daily for 7 days                                ferrous sulfate 325 (65 FE) MG tablet   Commonly known as:  IRON   Take 1 tablet (325 mg) by mouth 2 times daily                                FLINTSTONES COMPLETE PO   Take 1 tablet by mouth daily.                                fluticasone 50 MCG/ACT spray   Commonly known as:  FLONASE   Spray 2 sprays into both nostrils daily   Start taking on:  6/27/2017                                sodium chloride 0.65 % nasal spray   Commonly known as:  OCEAN   Use 3-4 sprays in each nostril every hour while awake for 2 weeks, then use at least 4 times daily for 1 month                                VIACTIV PO   Take 1 tablet by mouth daily.

## 2017-06-13 NOTE — ANESTHESIA CARE TRANSFER NOTE
Patient: Kamila Ma    Procedure(s):  SEPTOPLASTY, TURBINATE REDUCTION - Wound Class: II-Clean Contaminated    Diagnosis: DNS, NASAL TURBINATE HYPERTROPHY, CHRONIC ETHMOIDAL SINUSITIS  Diagnosis Additional Information: No value filed.    Anesthesia Type:   General, ETT     Note:  Airway :Face Mask  Patient transferred to:PACU        Vitals: (Last set prior to Anesthesia Care Transfer)    CRNA VITALS  6/13/2017 1437 - 6/13/2017 1514      6/13/2017             Resp Rate (set): 8                Electronically Signed By: SAUL Dye CRNA  June 13, 2017  3:14 PM

## 2017-06-13 NOTE — OR NURSING
Patient received a copy of the Patient Bill of Rights. Patient  does not need any clarification or have any questions regarding the Patient Bill of Rights.

## 2017-06-13 NOTE — IP AVS SNAPSHOT
[facility] Preop/Phase II    [address] [address]    Phone:  [phone]                                       After Visit Summary   6/13/2017    Kamila Ma    MRN: [id number]           After Visit Summary Signature Page     I have received my discharge instructions, and my questions have been answered. I have discussed any challenges I see with this plan with the nurse or doctor.    ..........................................................................................................................................  Patient/Patient Representative Signature      ..........................................................................................................................................  Patient Representative Print Name and Relationship to Patient    ..................................................               ................................................  Date                                            Time    ..........................................................................................................................................  Reviewed by Signature/Title    ...................................................              ..............................................  Date                                                            Time

## 2017-06-13 NOTE — OP NOTE
PREOPERATIVE DIAGNOSES:   1. Deviated nasal septum.   2. Turbinate hypertrophy.   3. Nasal obstruction.   4. Crouzon syndrome/craniofacial dysostosis    POSTOPERATIVE DIAGNOSES:   1. Same    PROCEDURES PERFORMED:   1. Septoplasty with reimplantation of cartilage .   2. Bilateral submucosal reduction of the inferior turbinates.     SURGEON: Gina Casillas D.O.  BLOOD LOSS: <5 mL.   COMPLICATIONS: None.   SPECIMENS: None.   ANESTHESIA: GETA.   INDICATIONS: Kamila Ma  presented to me with a lifelong history of chronic nasal obstruction.  On evaluation, the patient had severely deviated septum and turbinate hypertrophy. Therefore, my recommendation was for the above-named procedures. Preoperatively, risks discussed included the risks of infection, bleeding, the risks of general anesthesia, possible injury to the eyes, base of skull and tear duct system, septal perforation,  and possible failure of the surgery to achieve the desired result. The patient understood these risks and possible outcomes and wished to proceed.   OPERATIVE PROCEDURE: After being taken to the operating room and induction of general endotracheal tube anesthesia, the bed was rotated 90 degrees.  After that, he was prepped and draped in the normal clean fashion. I began by applying topical anesthetic in the form of 2 cottonoids on each side of the nose which had been soaked with a total of 4 mL of 4% liquid cocaine. In addition, I injected 1%lidocaine with 1:100,000 epinephrine into the base of the columella as well as the anterior insertion of the inferior turbinates bilaterally in preparation for later submucous resection.   I removed the cottonoids from the right side of the nose and entered the right nasal cavity.   I wore surgical loupes throughout the procedure.   I made a right hemitransfixion incision 2 mm posterior to the caudal septum.   I then dissected down onto cartilaginous septum and created a right anterior inferior  tunnel with a colten elevator.  I then continued dissection through the right-sided incision to the left side. I then was able to start a mucoperichondrial pocket directly on the left side of the cartilaginous septum.  A left anterior-inferior as well as a posterior mucoperichondrial and periosteal flap was elevated with a colten. After I completely elevated the mucoperichondrium off the left side of the septum, I then made a hemitransfixion incision through the cartilage approximately 1.5-2.0 cm back from the anterior edge. I broke over to the right side and raised a submucoperiosteal flap on the entire right side of the nasal septum.  I was able to carefully tease the mucoperichondrium off the large left maxillary crest spur.  This allowed the medialization of the caudal and mid right septal deviation.      After this was done, I used a swivel knife to remove the entire rhomboid portion of the cartilaginous septum, and I removed it in one large piece. It was brought to the back table and morselized, and straightened with hash marks on the concavity.  I  then reinserted the cartilage back into the mucoperichondrial pocket.    I used hash marks on the concave portion of the intact cephalad cartilage as well as suture fixation with clear 5-0 nylon to further medialize the superior septum.     I laid the flaps back together and this significantly improved the nasal airway. I then closed my septoplasty incision with a running horizontal mattress suture of 4-0 vicryl and chronic.      The premaxilla was sutured to the caudal septum using 5-0 clear nylon with a buried submucosal suture.    The hemitransfixion incision was closed with interrupted 4-0 chromic.  The septum is intact and midline.    I then proceeded with the turbinate reduction.  I used the coblation turbinate blade starting on the right side.  I introduced the blade at the mid to inferior portion of the inferior turbinate on the coagulation setting of 2.   Coagulation of 5 was used to submucosally reduce the turbinate at each demarcation site, with each site held for 10 seconds.    This was performed bilaterally.     I then outfractured the turbinates with a freer.    No packing was necessary.    At this point, the entire procedure was now complete. I reinspected both sides of the nose and there was good hemostasis.      I examined the nose endoscopically pre and postoperatively, and photos were taken    All instruments were accounted for and all counts were correct. The patient's bed was rotated 90 degrees back to the care of anesthesia. The patient was awakened, extubated and sent to the recovery room in good condition.

## 2017-06-13 NOTE — DISCHARGE INSTRUCTIONS
Instructions for Nasal Surgery    Recovery - Everyone recovers differently from a general anesthetic.  Symptoms such as fatigue, nausea, light-headedness, and sometimes a low grade fever (up to 100 degrees) are not unusual.  As your body removes the anesthetic drugs from circulation, these symptoms will resolve.  Your nose will be sore after surgery, and you may even have symptoms similar to a sinus infection with headache, congestion, and pressure.  These will resolve with healing.  For several days you may experience bloody drainage from the nose, please use the drip pad as necessary for this.  If there is persistent bleeding, please call the office during business hours or the on call ENT physician after hours.  There are no diet restrictions after sinus surgery, and you can resume your home medications.  Please do not blow your nose until two weeks after surgery.  Limit your activity to no strenuous activities until I see you for the first follow-up visit in approximately 2 weeks.      Medications - You were sent home with narcotic pain medication and an antibiotic.  If you can tolerate the discomfort during your recovery by using just plain Tylenol (or ibuprofen (advil) if allowed by Dr. Casillas), please do so.  However, do not hesitate to use the stronger pain medication if needed.  If you were sent home with an antibiotic, it is primarily used to help the healing process.  If it causes loose bowel movements or other signs of intolerance, it is appropriate to discontinue it.  By far the most important measure you can take to speed recovery, and maximize the chances of long term success of nasal surgery is using the sinus rinses at least three time per day for the first month after surgery.   Start ocean nasal spray tonight and use 2-3 sprays in each nostril at least 5 times daily.    1 week after surgery start using nasal steroids (flonase, nasonex, etc).      Complications - Problems related to nasal  surgery almost always are detected during the operation, and special instruction will be given in that situation.  However, unexpected things can happen, and are all related to the structures around the sinus cavities.  Symptoms that should alert you to a possible problem include: severe eye pain or eye swelling, persistent heavy bleeding from the nose, and high fevers with headache and neck pain.  Any of these symptoms should be called into my office or to the on call ENT if after hours.      Follow-up -  Follow up appointments are necessary  to aggressively manage the most common complication,  which is scar tissue blocking the nasal airway.  These visits will require the examination of your nose and possibly removal of crusts of dry mucous and blood, with possible removal of early scar tissue.  Please prepare for these visits by using your ocean spray.    See India Raymundo ENT PA within 1-2 weeks after surgery.  See Dr. Casillas in 1 month.    If there are any questions or issues with the above, or if there are other issues that concern you, always feel free to call the clinic and I am happy to speak with you as soon as I can.    Gina Casillas D.O.  Otolaryngology/Head and Neck Surgery  Allergy    [phone] office    Remove the scopolamine patch behind your left ear after 24 hours after application.   After removing the patch, wash your hands and the area behind your ear thoroughly with soap and water.   The patch will still contain some medicine after use.   To avoid accidental contact or ingestion by children or pets, fold the used patch in half with the sticky side together and throw away in the trash out of the reach of children and pets.           Post-Anesthesia Patient Instructions    IMMEDIATELY FOLLOWING SURGERY:  Do not drive or operate machinery for the first twenty four hours after surgery.  Do not make any important decisions for twenty four hours after surgery or while taking narcotic  pain medications or sedatives.  If you develop intractable nausea and vomiting or a severe headache please notify your doctor immediately.    FOLLOW-UP:  Please make an appointment with your surgeon as instructed. You do not need to follow up with anesthesia unless specifically instructed to do so.    WOUND CARE INSTRUCTIONS (if applicable):  Keep a dry clean dressing on the anesthesia/puncture wound site if there is drainage.  Once the wound has quit draining you may leave it open to air.  Generally you should leave the bandage intact for twenty four hours unless there is drainage.  If the epidural site drains for more than 36-48 hours please call the anesthesia department.    QUESTIONS?:  Please feel free to call your physician or the hospital  if you have any questions, and they will be happy to assist you.

## 2017-06-13 NOTE — OR NURSING
Patient and responsible adult given discharge instructions with no questions regarding instructions. Aysha score 18. Pain level 2/10.  Discharged from unit via wheelchair. Patient discharged to home.

## 2017-06-13 NOTE — ANESTHESIA PREPROCEDURE EVALUATION
Anesthesia Evaluation     . Pt has had prior anesthetic.            ROS/MED HX    ENT/Pulmonary:     (+)ZEUS risk factors (BMI: 38.66) obese, allergic rhinitis, other ENT- DNS, NASAL TURBINATE HYPERTROPHY, CHRONIC ETHMOIDAL SINUSITIS, s/p T&A, , . .    Neurologic:  - neg neurologic ROS     Cardiovascular:     (+) ----. : . . fainting (syncope). :. .       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      Psychiatric:  - neg psychiatric ROS       Infectious Disease:  - neg infectious disease ROS       Malignancy:      - no malignancy   Other:    (+) No chance of pregnancy   - neg other ROS                 Physical Exam  Normal systems: cardiovascular, pulmonary and dental    Airway   Mallampati: III  TM distance: >3 FB  Neck ROM: full    Dental     Cardiovascular   Rhythm and rate: regular and normal      Pulmonary    breath sounds clear to auscultation                    Anesthesia Plan      History & Physical Review  History and physical reviewed and following examination; no interval change.    ASA Status:  3 .    NPO Status:  > 8 hours    Plan for General and ETT with Intravenous and Propofol induction. Maintenance will be Balanced.    PONV prophylaxis:  Ondansetron (or other 5HT-3), Scopolamine patch and Dexamethasone or Solumedrol  Additional equipment: Videolaryngoscope HCG Negative  -22 Modifier: Substantial additional work required secondary to Airway/Field avoidance at 180 degrees  Will provide hypotensive anesthesia per surgeon's request      Postoperative Care  Postoperative pain management:  IV analgesics and Oral pain medications.       Consents  Anesthetic plan, risks, benefits and alternatives discussed with:  Patient..                          .

## 2017-06-13 NOTE — OR NURSING
To \[facility]\"" for post op discharge. MOM and Dr Casillas at bedside. Scant bleeding from nose. No pain., Pt wants water and cookie.  No nausea.

## 2017-06-14 NOTE — ANESTHESIA POSTPROCEDURE EVALUATION
Patient: Kamila Ma    Procedure(s):  SEPTOPLASTY, TURBINATE REDUCTION - Wound Class: II-Clean Contaminated    Diagnosis:DNS, NASAL TURBINATE HYPERTROPHY, CHRONIC ETHMOIDAL SINUSITIS  Diagnosis Additional Information: No value filed.    Anesthesia Type:  General, ETT    Note:  Anesthesia Post Evaluation    Patient location during evaluation: PACU, Phase 2 and Bedside  Patient participation: Able to fully participate in evaluation  Level of consciousness: awake and alert  Pain management: adequate  Airway patency: patent  Cardiovascular status: acceptable  Respiratory status: acceptable  Hydration status: stable  PONV: none     Anesthetic complications: None          Last vitals:  Vitals:    06/13/17 1545 06/13/17 1600 06/13/17 1615   BP: 123/90 125/92 108/90   Pulse:      Resp: 16 16 16   Temp:      SpO2: 99% 100%          Electronically Signed By: Toni Hassan MD  June 14, 2017  6:38 AM

## 2017-06-20 ENCOUNTER — OFFICE VISIT (OUTPATIENT)
Dept: OTOLARYNGOLOGY | Facility: OTHER | Age: 22
End: 2017-06-20
Attending: PHYSICIAN ASSISTANT
Payer: COMMERCIAL

## 2017-06-20 VITALS
BODY MASS INDEX: 38.98 KG/M2 | WEIGHT: 220 LBS | HEIGHT: 63 IN | HEART RATE: 72 BPM | TEMPERATURE: 98.6 F | SYSTOLIC BLOOD PRESSURE: 100 MMHG | DIASTOLIC BLOOD PRESSURE: 62 MMHG

## 2017-06-20 DIAGNOSIS — Z98.890 S/P NASAL SEPTOPLASTY: Primary | ICD-10-CM

## 2017-06-20 DIAGNOSIS — J32.2 CHRONIC ETHMOIDAL SINUSITIS: ICD-10-CM

## 2017-06-20 DIAGNOSIS — Q75.1 CROUZON CRANIOFACIAL DYSOSTOSIS: ICD-10-CM

## 2017-06-20 PROCEDURE — 99024 POSTOP FOLLOW-UP VISIT: CPT | Performed by: PHYSICIAN ASSISTANT

## 2017-06-20 PROCEDURE — 31237 NSL/SINS NDSC SURG BX POLYPC: CPT | Mod: 79 | Performed by: PHYSICIAN ASSISTANT

## 2017-06-20 ASSESSMENT — PAIN SCALES - GENERAL: PAINLEVEL: MILD PAIN (2)

## 2017-06-20 NOTE — PROGRESS NOTES
Chief Complaint   Patient presents with     Surgical Followup     Pt is s/p septoplasty with reimplantation of cartilage and TR on 6/13/17.  Pt is doing well.       This is a 22 year old patient who is post op day 7 from a septoplasty and bilateral submucus reduction of inferior turbinates.  The patient has had a week  with some pink drainage from the nose and an occasional headache.  The patient denies fever, chills, rash, swelling around the eyes or change in vision.    She has been doing well. Reports some bleeding from right nares following a sneeze.   No other concerns.   PREOPERATIVE DIAGNOSES:   1. Deviated nasal septum.   2. Turbinate hypertrophy.   3. Nasal obstruction.   4. Crouzon syndrome/craniofacial dysostosis     POSTOPERATIVE DIAGNOSES:   1. Same     PROCEDURES PERFORMED:   1. Septoplasty with reimplantation of cartilage .   2. Bilateral submucosal reduction of the inferior turbinates.      SURGEON: Gina Casillas D.O.  BLOOD LOSS: <5 mL.   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     Diarrhea 01/01/2011     Hearing loss 01/01/2011     Mixed hearing loss 12/4/2015     Multiple allergies 01/01/2011     Other iron deficiency anemia 7/18/2016     Otomycosis of right ear 09/10/2012     Vasovagal near syncope 7/18/2016        Allergies   Allergen Reactions     Sulfa Drugs Hives     SULFA (SULFONAMIDE ANTIBIOTICS)     Topamax Diarrhea     Current Outpatient Prescriptions   Medication     cephALEXin (KEFLEX) 500 MG capsule     [START ON 6/27/2017] fluticasone (FLONASE) 50 MCG/ACT spray     sodium chloride (OCEAN) 0.65 % nasal spray     acetaminophen-codeine (TYLENOL W/CODEINE NO. 3) 300-30 MG per tablet     ferrous sulfate (IRON) 325 (65 FE) MG tablet     Pediatric Multivit-Minerals-C (FLINTSTONES COMPLETE PO)      "Calcium-Vitamin D-Vitamin K (VIACTIV PO)     No current facility-administered medications for this visit.       ROS: 10 point ROS neg other than the symptoms noted above in the HPI.  /62 (BP Location: Right arm, Cuff Size: Adult Large)  Pulse 72  Temp 98.6  F (37  C) (Tympanic)  Ht 5' 3\" (1.6 m)  Wt 220 lb (99.8 kg)  BMI 38.97 kg/m2      The patient is alert and in no distress.  General - The patient is awake and alert, and answers questions appropriately during the history and physical.  The vocal quality is hypernasal, but there is no dyspnea or stridor noted.  Eyes - The EOMI, there is no conjuncitval or scleral injection.  Pupils are equally round and reactive to light.  Oral - The oral mucosa is pink and moist.  The tongue is mobile and midline on protrusion, no edema noted.  Nasal - The nasal examination was done with a rigid nasal endoscope today for the purposes of bilateral endoscopically assisted debridement of the sinuses.  I began by spraying both sides with lidocaine and neosynephrine.    I began on the left side.    No purulence noted. I turned my attention to the right side.  Once again some dark crust was dislodged from the middle meatus and removed from the nose.  I was then able to pass the scope into the right middle meatus.    Bialterally, no early synechiae or touch points were noted.    Patient is happy and can breath from nares.           ASSESSMENT:    ICD-10-CM    1. S/P nasal septoplasty Z98.890    2. Chronic ethmoidal sinusitis J32.2    3. 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  The patient is doing very well.  The patient is given post op instructions again and will recheck in 1 month.  The patient will call for any questions including concerns about pain or any bleeding.  Ocean nasal spray can be used 2 puffs each nare 4 times a day.  Anderson med rinse was reviewed.   Follow up rivka Santos " Vinny as scheduled.     India Raymundo PA-C  ENT  [facility], [place]  [phone]

## 2017-06-20 NOTE — PATIENT INSTRUCTIONS
Continue with post op instructions.   Maintain Nasal saline as directed  Follow up in 2-3 weeks for repeat exam      If there are concerns or questions, call [phone] and ask for nurse

## 2017-06-20 NOTE — NURSING NOTE
"Chief Complaint   Patient presents with     Surgical Followup     Pt is s/p septoplasty with reimplantation of cartilage and TR on 6/13/17.  Pt is doing well.       Initial /62 (BP Location: Right arm, Cuff Size: Adult Large)  Pulse 72  Temp 98.6  F (37  C) (Tympanic)  Ht 5' 3\" (1.6 m)  Wt 220 lb (99.8 kg)  BMI 38.97 kg/m2 Estimated body mass index is 38.97 kg/(m^2) as calculated from the following:    Height as of this encounter: 5' 3\" (1.6 m).    Weight as of this encounter: 220 lb (99.8 kg).  Medication Reconciliation: complete   Deedee Ledbetter LPN      "

## 2017-06-20 NOTE — MR AVS SNAPSHOT
"              After Visit Summary   6/20/2017    Kamila Ma    MRN: [id number]           Patient Information     Date Of Birth          1995        Visit Information        Provider Department      6/20/2017 8:15 AM India Raymundo PA-C [facility] [place]        Today's Diagnoses     S/P nasal septoplasty    -  1    Chronic ethmoidal sinusitis        Crouzon craniofacial dysostosis          Care Instructions    Continue with post op instructions.   Maintain Nasal saline as directed  Follow up in 2-3 weeks for repeat exam      If there are concerns or questions, call [phone] and ask for nurse          Follow-ups after your visit        Your next 10 appointments already scheduled     Jul 06, 2017  1:15 PM CDT   (Arrive by 1:00 PM)   Return Visit with Gina Casillas MD   [facility] [place] ([facility] [place] )    [address]  [place] [address] [phone]              Who to contact     If you have questions or need follow up information about today's clinic visit or your schedule please contact [facility] [place] directly at [phone].  Normal or non-critical lab and imaging results will be communicated to you by [employer], letter or phone within 4 business days after the clinic has received the results. If you do not hear from us within 7 days, please contact the clinic through [employer] or phone. If you have a critical or abnormal lab result, we will notify you by phone as soon as possible.  Submit refill requests through [employer] or call your pharmacy and they will forward the refill request to us. Please allow 3 business days for your refill to be completed.          Additional Information About Your Visit        MyChart Information     [employer] lets you send messages to your doctor, view your test results, renew your prescriptions, schedule appointments and more. To sign up, go to [email or web address] . Click on \"Log in\" on the left side of the screen, " "which will take you to the Welcome page. Then click on \"Sign up Now\" on the right side of the page.     You will be asked to enter the access code listed below, as well as some personal information. Please follow the directions to create your username and password.     Your access code is: [email or web address]  Expires: 2017  3:34 PM     Your access code will  in 90 days. If you need help or a new code, please call your [place] clinic or [phone].        Care EveryWhere ID     This is your Care EveryWhere ID. This could be used by other organizations to access your [place] medical records  [id number]        Your Vitals Were     Pulse Temperature Height BMI (Body Mass Index)          72 98.6  F (37  C) (Tympanic) 5' 3\" (1.6 m) 38.97 kg/m2         Blood Pressure from Last 3 Encounters:   17 100/62   17 108/90   17 112/70    Weight from Last 3 Encounters:   17 220 lb (99.8 kg)   17 223 lb (101.2 kg)   17 218 lb (98.9 kg)              Today, you had the following     No orders found for display       Primary Care Provider Office Phone # Fax #    Sol Kim [phone]       [facility] [place] [address] [place] [address]        Thank you!     [facility] you [facility]. Our goal is always to provide you with excellent care. Hearing back from our patients is one way we can continue to improve our services. Please take a few minutes to complete the written survey that you may receive in the mail after your visit with us. Thank you!             Your Updated Medication List - Protect others around you: Learn how to safely use, store and throw away your medicines at www.disposemymeds.org.          This list is accurate as of: 17  8:26 AM.  Always use your most recent med list.                   Brand Name Dispense Instructions for use    acetaminophen-codeine 300-30 MG per tablet    TYLENOL w/CODEINE No. " 3    20 tablet    Take 1-2 tablets by mouth every 6 hours as needed for mild pain       cephALEXin 500 MG capsule    KEFLEX    21 capsule    Take 1 capsule (500 mg) by mouth 3 times daily for 7 days       ferrous sulfate 325 (65 FE) MG tablet    IRON    60 tablet    Take 1 tablet (325 mg) by mouth 2 times daily       FLINTSTONES COMPLETE PO      Take 1 tablet by mouth daily.       fluticasone 50 MCG/ACT spray   Start taking on:  6/27/2017    FLONASE    16 g    Spray 2 sprays into both nostrils daily       sodium chloride 0.65 % nasal spray    OCEAN    1 Bottle    Use 3-4 sprays in each nostril every hour while awake for 2 weeks, then use at least 4 times daily for 1 month       VIACTIV PO      Take 1 tablet by mouth daily.

## 2017-07-12 ENCOUNTER — TELEPHONE (OUTPATIENT)
Dept: OTOLARYNGOLOGY | Facility: OTHER | Age: 22
End: 2017-07-12

## 2017-07-12 NOTE — TELEPHONE ENCOUNTER
"Patient called stating she is having some post sinus surgery pain and would like Dr. Casillas to look at it.  She had an appointment last week but cancelled it, but now she is having \"intense pain, and she can see something that looks like cartilage.\" Patient was told Dr. Casillas has a full schedule, and she has a follow-up schedule for 8-10-17, but I would route a call to the nurse to see if she needs to be seen sooner.  Routed to Corina for review.  "

## 2017-07-12 NOTE — TELEPHONE ENCOUNTER
India, Would you want to see this patient tomorrow afternoon. It looks like you have an opening at 2:45-3. Please Advise.

## 2017-07-12 NOTE — TELEPHONE ENCOUNTER
LVM on patient's personal work line to confirm appointment, request call back to confirm she can come.

## 2017-07-13 ENCOUNTER — OFFICE VISIT (OUTPATIENT)
Dept: OTOLARYNGOLOGY | Facility: OTHER | Age: 22
End: 2017-07-13
Attending: PHYSICIAN ASSISTANT
Payer: COMMERCIAL

## 2017-07-13 VITALS
DIASTOLIC BLOOD PRESSURE: 74 MMHG | SYSTOLIC BLOOD PRESSURE: 112 MMHG | BODY MASS INDEX: 38.98 KG/M2 | TEMPERATURE: 98.6 F | WEIGHT: 220 LBS | HEART RATE: 70 BPM | HEIGHT: 63 IN

## 2017-07-13 DIAGNOSIS — Z98.890 S/P NASAL SEPTOPLASTY: Primary | ICD-10-CM

## 2017-07-13 DIAGNOSIS — Q75.1 CROUZON CRANIOFACIAL DYSOSTOSIS: ICD-10-CM

## 2017-07-13 PROCEDURE — 99024 POSTOP FOLLOW-UP VISIT: CPT | Performed by: PHYSICIAN ASSISTANT

## 2017-07-13 PROCEDURE — 31237 NSL/SINS NDSC SURG BX POLYPC: CPT | Mod: 79 | Performed by: PHYSICIAN ASSISTANT

## 2017-07-13 ASSESSMENT — PAIN SCALES - GENERAL: PAINLEVEL: NO PAIN (0)

## 2017-07-13 NOTE — PROGRESS NOTES
"Chief Complaint   Patient presents with     Surgical Followup     Pt is s/p septoplasty with reimplantation of cartilage and TR on 6/13/17   patient reports pain following suture removal. She had one stitch that fell out. Reports there is something in her left nares possible cartrlidge coming out. She requested exam.   PREOPERATIVE DIAGNOSES:   1. Deviated nasal septum.   2. Turbinate hypertrophy.   3. Nasal obstruction.   4. Crouzon syndrome/craniofacial dysostosis      POSTOPERATIVE DIAGNOSES:   1. Same      PROCEDURES PERFORMED:   1. Septoplasty with reimplantation of cartilage .   2. Bilateral submucosal reduction of the inferior turbinates.   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     Diarrhea 01/01/2011     Hearing loss 01/01/2011     Mixed hearing loss 12/4/2015     Multiple allergies 01/01/2011     Other iron deficiency anemia 7/18/2016     Otomycosis of right ear 09/10/2012     Vasovagal near syncope 7/18/2016        Allergies   Allergen Reactions     Sulfa Drugs Hives     SULFA (SULFONAMIDE ANTIBIOTICS)     Topamax Diarrhea     Current Outpatient Prescriptions   Medication     fluticasone (FLONASE) 50 MCG/ACT spray     sodium chloride (OCEAN) 0.65 % nasal spray     acetaminophen-codeine (TYLENOL W/CODEINE NO. 3) 300-30 MG per tablet     ferrous sulfate (IRON) 325 (65 FE) MG tablet     Pediatric Multivit-Minerals-C (FLINTSTONES COMPLETE PO)     Calcium-Vitamin D-Vitamin K (VIACTIV PO)     No current facility-administered medications for this visit.       ROS: 10 point ROS neg other than the symptoms noted above in the HPI.  /74 (Cuff Size: Adult Large)  Pulse 70  Temp 98.6  F (37  C) (Tympanic)  Ht 5' 3\" (1.6 m)  Wt 220 lb (99.8 kg)  BMI 38.97 kg/m2    General - The patient is awake and alert, and answers questions " appropriately during the history and physical.  The vocal quality is hypernasal, but there is no dyspnea or stridor noted.  Eyes - The EOMI, there is no conjuncitval or scleral injection.  Pupils are equally round and reactive to light.  Oral - The oral mucosa is pink and moist.  The tongue is mobile and midline on protrusion, no edema noted.  Nasal - The nasal examination was done with a rigid nasal endoscope today for the purposes of bilateral endoscopically I began by spraying both sides with lidocaine and neosynephrine.    I began on the left side.    No purulence noted. I turned my attention to the right side.  Once again some dark crust was dislodged from the middle meatus and removed from the nose.  I was then able to pass the scope into the right middle meatus.   Suture trimmed at mid septum left. Suture was left in position.   Bialterally, no early synechiae or touch points were noted.    ASSESSMENT:    ICD-10-CM    1. S/P nasal septoplasty Z98.890    2. 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 (patient reports 1 fell out at home).     India Raymundo PA-C  ENT  [facility], [place]  [phone]

## 2017-07-13 NOTE — NURSING NOTE
"Chief Complaint   Patient presents with     Surgical Followup     Pt is s/p septoplasty with reimplantation of cartilage and TR on 6/13/17       Initial /74 (Cuff Size: Adult Large)  Pulse 70  Temp 98.6  F (37  C) (Tympanic)  Ht 5' 3\" (1.6 m)  Wt 220 lb (99.8 kg)  BMI 38.97 kg/m2 Estimated body mass index is 38.97 kg/(m^2) as calculated from the following:    Height as of this encounter: 5' 3\" (1.6 m).    Weight as of this encounter: 220 lb (99.8 kg).  Medication Reconciliation: complete   Corina Robb      "

## 2017-07-13 NOTE — MR AVS SNAPSHOT
"              After Visit Summary   7/13/2017    Kamila Ma    MRN: [id number]           Patient Information     Date Of Birth          1995        Visit Information        Provider Department      7/13/2017 2:45 PM India Raymundo PA-C [facility]        Care Instructions    Continue with nasal cares  Suture trimmed    Take Tylenol/ Ibuprofen for pain if needed    If there are concerns or questions, Call [phone] and ask for nurse           Follow-ups after your visit        Your next 10 appointments already scheduled     Aug 10, 2017  1:45 PM CDT   (Arrive by 1:30 PM)   Return Visit with Gina Casillas MD   [facility] [place] ([facility] )    [address]  [place] [address]   [phone]              Who to contact     If you have questions or need follow up information about today's clinic visit or your schedule please contact [facility] directly at [phone].  Normal or non-critical lab and imaging results will be communicated to you by MyChart, letter or phone within 4 business days after the clinic has received the results. If you do not hear from us within 7 days, please contact the clinic through [employer] or phone. If you have a critical or abnormal lab result, we will notify you by phone as soon as possible.  Submit refill requests through shipbeat or call your pharmacy and they will forward the refill request to us. Please allow 3 business days for your refill to be completed.          Additional Information About Your Visit        [employer] llchart Information     shipbeat lets you send messages to your doctor, view your test results, renew your prescriptions, schedule appointments and more. To sign up, go to [email or web address] . Click on \"Log in\" on the left side of the screen, which will take you to the Welcome page. Then click on \"Sign up Now\" on the right side of the page.     You will be asked to enter the access code listed " "below, as well as some personal information. Please follow the directions to create your username and password.     Your access code is: [email or web address]  Expires: 10/11/2017  3:29 PM     Your access code will  in 90 days. If you need help or a new code, please call your [place] clinic or [phone].        Care EveryWhere ID     This is your Care EveryWhere ID. This could be used by other organizations to access your [place] medical records  [id number]        Your Vitals Were     Pulse Temperature Height BMI (Body Mass Index)          70 98.6  F (37  C) (Tympanic) 5' 3\" (1.6 m) 38.97 kg/m2         Blood Pressure from Last 3 Encounters:   17 112/74   17 100/62   17 108/90    Weight from Last 3 Encounters:   17 220 lb (99.8 kg)   17 220 lb (99.8 kg)   17 223 lb (101.2 kg)              Today, you had the following     No orders found for display       Primary Care Provider Office Phone # Fax #    Sol Kim [phone]       [facility] [place] [address] [address]        Equal Access to Services     [facility] Hospital[facility]IE : Hadii aad ku hadasho Soomaali, waaxda luqadaha, qaybta kaalmada adeegyada, waxay idiin haystephonn luis armando stahl . So [facility] [phone].    ATENCIÓN: Si habla español, tiene a jean disposición servicios gratuitos de asistencia lingüística. Llame al [phone].    We comply with applicable federal civil rights laws and [place] laws. We do not discriminate on the basis of race, color, national origin, age, disability sex, sexual orientation or gender identity.            Thank you!     Thank you for choosing [facility] [place]  for your care. Our goal is always to provide you with excellent care. Hearing back from our patients is one way we can continue to improve our services. Please take a few minutes to complete the written survey that you may receive in the mail after your visit with us. Thank you!           "   Your Updated Medication List - Protect others around you: Learn how to safely use, store and throw away your medicines at www.disposemymeds.org.          This list is accurate as of: 7/13/17  3:29 PM.  Always use your most recent med list.                   Brand Name Dispense Instructions for use Diagnosis    acetaminophen-codeine 300-30 MG per tablet    TYLENOL w/CODEINE No. 3    20 tablet    Take 1-2 tablets by mouth every 6 hours as needed for mild pain    Post-operative state       ferrous sulfate 325 (65 FE) MG tablet    IRON    60 tablet    Take 1 tablet (325 mg) by mouth 2 times daily    Iron deficiency anemia secondary to inadequate dietary iron intake       FLINTSTONES COMPLETE PO      Take 1 tablet by mouth daily.        fluticasone 50 MCG/ACT spray    FLONASE    16 g    Spray 2 sprays into both nostrils daily    Post-operative state       sodium chloride 0.65 % nasal spray    OCEAN    1 Bottle    Use 3-4 sprays in each nostril every hour while awake for 2 weeks, then use at least 4 times daily for 1 month    Post-operative state       VIACTIV PO      Take 1 tablet by mouth daily.

## 2017-07-13 NOTE — PATIENT INSTRUCTIONS
Continue with nasal cares  Suture trimmed    Take Tylenol/ Ibuprofen for pain if needed    If there are concerns or questions, Call [phone] and ask for nurse

## 2017-08-10 ENCOUNTER — OFFICE VISIT (OUTPATIENT)
Dept: OTOLARYNGOLOGY | Facility: OTHER | Age: 22
End: 2017-08-10
Attending: OTOLARYNGOLOGY
Payer: COMMERCIAL

## 2017-08-10 VITALS
WEIGHT: 233 LBS | BODY MASS INDEX: 41.29 KG/M2 | HEIGHT: 63 IN | DIASTOLIC BLOOD PRESSURE: 68 MMHG | SYSTOLIC BLOOD PRESSURE: 112 MMHG | HEART RATE: 72 BPM | TEMPERATURE: 98.1 F

## 2017-08-10 DIAGNOSIS — J34.89 NASAL OBSTRUCTION: Primary | ICD-10-CM

## 2017-08-10 PROCEDURE — 99024 POSTOP FOLLOW-UP VISIT: CPT | Performed by: OTOLARYNGOLOGY

## 2017-08-10 PROCEDURE — 31237 NSL/SINS NDSC SURG BX POLYPC: CPT | Mod: 79 | Performed by: OTOLARYNGOLOGY

## 2017-08-10 RX ORDER — FLUTICASONE PROPIONATE 50 MCG
2 SPRAY, SUSPENSION (ML) NASAL DAILY
Qty: 16 G | Refills: 12 | Status: SHIPPED | OUTPATIENT
Start: 2017-08-10 | End: 2017-09-09

## 2017-08-10 ASSESSMENT — PAIN SCALES - GENERAL: PAINLEVEL: NO PAIN (0)

## 2017-08-10 NOTE — PROGRESS NOTES
"Post op visit    HPI - Kamila Ma is here for their second postoperative visit, status post endoscopic sinus surgery (with septoplasty and turbinate reduction) performed on 6/13.  Notes she can see something in her left nares  Occasional side wall pain  Breathing better out of her nose    PREOPERATIVE DIAGNOSES:   1. Deviated nasal septum.   2. Turbinate hypertrophy.   3. Nasal obstruction.   4. Crouzon syndrome/craniofacial dysostosis     POSTOPERATIVE DIAGNOSES:   1. Same     PROCEDURES PERFORMED:   1. Septoplasty with reimplantation of cartilage .   2. Bilateral submucosal reduction of the inferior turbinates.      Physical Exam -   /68  Pulse 72  Temp 98.1  F (36.7  C) (Tympanic)  Ht 5' 3\" (1.6 m)  Wt 233 lb (105.7 kg)  BMI 41.27 kg/m2  General - The patient is awake and alert, and answers questions appropriately during the history and physical.  The vocal quality is hypernasal, but there is no dyspnea or stridor noted.  Eyes - The EOMI, there is no conjuncitval or scleral injection.  Pupils are equally round and reactive to light.  Oral - The oral mucosa is pink and moist.  The tongue is mobile and midline on protrusion, no edema noted.  Nasal - The nasal examination was done with a rigid nasal endoscope today for the purposes of bilateral endoscopically assisted debridement of the sinuses.  I began by spraying both sides with lidocaine and neosynephrine.   I began on the left side.  The IM and MM is patent, sutures trimmed but left clear nylon in place, minimal suction debridment clear secretions  Right caudal septum slightly skewed but improved from pre op, small synehciae  from IT to septum, IT cauterized      A/P - Kamila Ma is status septoplasty and turbinate reduction with a history of Crouzon  I reassured her that there are no worrisome secretions, told her cartilage does have memory and she may have some shift in her septal cartilage.  Consider open septorhinoplasty " if nasal obstruction worsens but she is happy with how she is breathing and thankful  I told Kamila she may note occasional nasal pain for up to 6 months post op, do not manipulate nose excessively  Continue flonase

## 2017-08-10 NOTE — PATIENT INSTRUCTIONS
Thank you for allowing Dr. Casillas and our ENT team to participate in your care.  If you have a scheduling or an appointment question please contact [facility] our [facility] Unit Coordinator at their direct line [phone].   ALL nursing questions or concerns can be directed to your ENT nurse at: [phone] Sima Arriaga    Continue Flonase as directed  Follow up with ENT as needed

## 2017-08-10 NOTE — MR AVS SNAPSHOT
"              After Visit Summary   8/10/2017    Kamila Ma    MRN: [id number]           Patient Information     Date Of Birth          1995        Visit Information        Provider Department      8/10/2017 1:45 PM Gina Casillas MD [facility]        Care Instructions    Thank you for allowing Dr. Casillas and our ENT team to participate in your care.  If you have a scheduling or an appointment question please contact [facility] Unit Coordinator at their direct line [phone].   ALL nursing questions or concerns can be directed to your ENT nurse at: [phone] - [place]    Continue Flonase as directed  Follow up with ENT as needed          Follow-ups after your visit        Follow-up notes from your care team     Return if symptoms worsen or fail to improve.      Who to contact     If you have questions or need follow up information about today's clinic visit or your schedule please contact [facility] directly at [phone].  Normal or non-critical lab and imaging results will be communicated to you by MyChart, letter or phone within 4 business days after the clinic has received the results. If you do not hear from us within 7 days, please contact the clinic through Aposensehart or phone. If you have a critical or abnormal lab result, we will notify you by phone as soon as possible.  Submit refill requests through Sellfy or call your pharmacy and they will forward the refill request to us. Please allow 3 business days for your refill to be completed.          Additional Information About Your Visit        Aposensehart Information     Sellfy lets you send messages to your doctor, view your test results, renew your prescriptions, schedule appointments and more. To sign up, go to [email or web address] . Click on \"Log in\" on the left side of the screen, which will take you to the Welcome page. Then click on \"Sign up Now\" on the right side of the page.     You " "will be asked to enter the access code listed below, as well as some personal information. Please follow the directions to create your username and password.     Your access code is: [email or web address]  Expires: 10/11/2017  3:29 PM     Your access code will  in 90 days. If you need help or a new code, please call your [place] clinic or [phone].        Care EveryWhere ID     This is your Care EveryWhere ID. This could be used by other organizations to access your [place] medical records  [id number]        Your Vitals Were     Pulse Temperature Height BMI (Body Mass Index)          72 98.1  F (36.7  C) (Tympanic) 5' 3\" (1.6 m) 41.27 kg/m2         Blood Pressure from Last 3 Encounters:   08/10/17 112/68   17 112/74   17 100/62    Weight from Last 3 Encounters:   08/10/17 233 lb (105.7 kg)   17 220 lb (99.8 kg)   17 220 lb (99.8 kg)              Today, you had the following     No orders found for display       Primary Care Provider Office Phone # Fax #    Sol ASHLEY Kim [phone]       [facility] [place] [address] [facility] [address]        Equal Access to Services     [facility]: Hadii aad ku hadasho Soomaali, waaxda luqadaha, qaybta kaalmada adeegyada, sachin hernadez haybrayan stahl . So [facility] [phone].    ATENCIÓN: Si habla español, tiene a jean disposición servicios gratuitos de asistencia lingüística. Llame al [phone].    We comply with applicable federal civil rights laws and [place] laws. We do not discriminate on the basis of race, color, national origin, age, disability sex, sexual orientation or gender identity.            Thank you!     Thank you for choosing [facility]  [facility]. Our goal is always to provide you with excellent care. Hearing back from our patients is one way we can continue to improve our services. Please take a few minutes to complete the written survey that you may receive in the mail " after your visit with us. Thank you!             Your Updated Medication List - Protect others around you: Learn how to safely use, store and throw away your medicines at www.disposemymeds.org.          This list is accurate as of: 8/10/17  2:00 PM.  Always use your most recent med list.                   Brand Name Dispense Instructions for use Diagnosis    ferrous sulfate 325 (65 FE) MG tablet    IRON    60 tablet    Take 1 tablet (325 mg) by mouth 2 times daily    Iron deficiency anemia secondary to inadequate dietary iron intake       FLINTSTONES COMPLETE PO      Take 1 tablet by mouth daily.        sodium chloride 0.65 % nasal spray    OCEAN    1 Bottle    Use 3-4 sprays in each nostril every hour while awake for 2 weeks, then use at least 4 times daily for 1 month    Post-operative state       VIACTIV PO      Take 1 tablet by mouth daily.

## 2017-08-10 NOTE — NURSING NOTE
"Chief Complaint   Patient presents with     Surgical Followup     s/p septoplasty, turbinate reduction 6/13/17       Initial /68  Pulse 72  Temp 98.1  F (36.7  C) (Tympanic)  Ht 5' 3\" (1.6 m)  Wt 233 lb (105.7 kg)  BMI 41.27 kg/m2 Estimated body mass index is 41.27 kg/(m^2) as calculated from the following:    Height as of this encounter: 5' 3\" (1.6 m).    Weight as of this encounter: 233 lb (105.7 kg).  Medication Reconciliation: complete   Madhavi Bennett    "

## 2017-09-18 ENCOUNTER — OFFICE VISIT (OUTPATIENT)
Dept: FAMILY MEDICINE | Facility: OTHER | Age: 22
End: 2017-09-18
Attending: PHYSICIAN ASSISTANT
Payer: COMMERCIAL

## 2017-09-18 VITALS
SYSTOLIC BLOOD PRESSURE: 108 MMHG | BODY MASS INDEX: 41.63 KG/M2 | TEMPERATURE: 99.6 F | HEART RATE: 76 BPM | OXYGEN SATURATION: 97 % | WEIGHT: 235 LBS | DIASTOLIC BLOOD PRESSURE: 64 MMHG

## 2017-09-18 DIAGNOSIS — H65.01 RIGHT ACUTE SEROUS OTITIS MEDIA, RECURRENCE NOT SPECIFIED: Primary | ICD-10-CM

## 2017-09-18 PROBLEM — E66.01 MORBID OBESITY (H): Status: ACTIVE | Noted: 2017-09-18

## 2017-09-18 PROCEDURE — 99213 OFFICE O/P EST LOW 20 MIN: CPT | Performed by: PHYSICIAN ASSISTANT

## 2017-09-18 RX ORDER — AZITHROMYCIN 250 MG/1
TABLET, FILM COATED ORAL
Qty: 6 TABLET | Refills: 1 | Status: SHIPPED | OUTPATIENT
Start: 2017-09-18 | End: 2018-08-20

## 2017-09-18 ASSESSMENT — ANXIETY QUESTIONNAIRES
1. FEELING NERVOUS, ANXIOUS, OR ON EDGE: NOT AT ALL
6. BECOMING EASILY ANNOYED OR IRRITABLE: NOT AT ALL
GAD7 TOTAL SCORE: 0
4. TROUBLE RELAXING: NOT AT ALL
7. FEELING AFRAID AS IF SOMETHING AWFUL MIGHT HAPPEN: NOT AT ALL
5. BEING SO RESTLESS THAT IT IS HARD TO SIT STILL: NOT AT ALL
2. NOT BEING ABLE TO STOP OR CONTROL WORRYING: NOT AT ALL
3. WORRYING TOO MUCH ABOUT DIFFERENT THINGS: NOT AT ALL

## 2017-09-18 ASSESSMENT — PATIENT HEALTH QUESTIONNAIRE - PHQ9: SUM OF ALL RESPONSES TO PHQ QUESTIONS 1-9: 0

## 2017-09-18 ASSESSMENT — PAIN SCALES - GENERAL: PAINLEVEL: NO PAIN (0)

## 2017-09-18 NOTE — PROGRESS NOTES
SUBJECTIVE:   Kamila Ma is a 22 year old female who presents to clinic today for the following health issues:        Ear pain       Duration: 2-3 days     Description (location/character/radiation): in the right ear. Patient states feels pressure in the ear and pulsing feeling.    Intensity:  mild    Accompanying signs and symptoms: see above     History (similar episodes/previous evaluation): history of deafness     Precipitating or alleviating factors: None    Therapies tried and outcome: None             Problem list and histories reviewed & adjusted, as indicated.  Additional history: as documented    Patient Active Problem List   Diagnosis     Mixed hearing loss     Acute recurrent maxillary sinusitis     ACP (advance care planning)     Other iron deficiency anemia     Bariatric surgery status     Vasovagal near syncope     Crouzon craniofacial dysostosis     Morbid obesity (H)     Past Surgical History:   Procedure Laterality Date     ADENOIDECTOMY       GI SURGERY      [facility]      MOUTH SURGERY  8/2011    wisdom teeth extracted     PET placement       sagital suture release  6/1994     SEPTOPLASTY, TURBINOPLASTY, COMBINED N/A 6/13/2017    Procedure: COMBINED SEPTOPLASTY, TURBINOPLASTY;  SEPTOPLASTY, TURBINATE REDUCTION;  Surgeon: Gina Casillas MD;  Location: [facility]     TONSILLECTOMY         Social History   Substance Use Topics     Smoking status: Never Smoker     Smokeless tobacco: Never Used     Alcohol use No     Family History   Problem Relation Age of Onset     Hypertension Mother      Migraines Mother      DIABETES Paternal Grandfather      Hypertension Maternal Uncle      Hypertension Maternal Grandfather      Hypertension Maternal Grandmother      Depression Sister      Unknown/Adopted Paternal Grandmother          Current Outpatient Prescriptions   Medication Sig Dispense Refill     sodium chloride (OCEAN) 0.65 % nasal spray Use 3-4 sprays in each nostril every hour while awake  for 2 weeks, then use at least 4 times daily for 1 month 1 Bottle prn     ferrous sulfate (IRON) 325 (65 FE) MG tablet Take 1 tablet (325 mg) by mouth 2 times daily 60 tablet 1     Pediatric Multivit-Minerals-C (FLINTSTONES COMPLETE PO) Take 1 tablet by mouth daily.        Calcium-Vitamin D-Vitamin K (VIACTIV PO) Take 1 tablet by mouth daily.        Allergies   Allergen Reactions     Sulfa Drugs Hives     SULFA (SULFONAMIDE ANTIBIOTICS)     Topamax Diarrhea     BP Readings from Last 3 Encounters:   09/18/17 108/64   08/10/17 112/68   07/13/17 112/74    Wt Readings from Last 3 Encounters:   09/18/17 235 lb (106.6 kg)   08/10/17 233 lb (105.7 kg)   07/13/17 220 lb (99.8 kg)                        Reviewed and updated as needed this visit by clinical staffAllergies       Reviewed and updated as needed this visit by Provider         ROS:  Constitutional, neuro, ENT, endocrine, pulmonary, cardiac, gastrointestinal, genitourinary, musculoskeletal, integument and psychiatric systems are negative, except as otherwise noted.      OBJECTIVE:                                                    /64 (BP Location: Left arm, Patient Position: Chair, Cuff Size: Adult Large)  Pulse 76  Temp 99.6  F (37.6  C) (Tympanic)  Wt 235 lb (106.6 kg)  SpO2 97%  Breastfeeding? No  BMI 41.63 kg/m2  Body mass index is 41.63 kg/(m^2).  GENERAL APPEARANCE: healthy, alert and no distress  EYES: Eyes grossly normal to inspection, PERRL and conjunctivae and sclerae normal  HENT: ear canals and TM's normal and nose and mouth without ulcers or lesions  NECK: no adenopathy, no asymmetry, masses, or scars and thyroid normal to palpation  RESP: lungs clear to auscultation - no rales, rhonchi or wheezes  CV: regular rates and rhythm, normal S1 S2, no S3 or S4 and no murmur, click or rub  LYMPHATICS: normal ant/post cervical and supraclavicular nodes  ABDOMEN: soft, nontender, without hepatosplenomegaly or masses and bowel sounds normal  MS:  extremities normal- no gross deformities noted  SKIN: no suspicious lesions or rashes  NEURO: Normal strength and tone, mentation intact and speech normal  PSYCH: mentation appears normal and affect normal/bright    Diagnostic test results:  Diagnostic Test Results:  No results found for this or any previous visit (from the past 24 hour(s)).       ASSESSMENT/PLAN:                                                      1. Right acute serous otitis media, recurrence not specified  Has severe ear issues.  Hearing aide is worn. See us back as recommended.     - azithromycin (ZITHROMAX) 250 MG tablet; Two tablets first day, then one tablet daily for four days.  Dispense: 6 tablet; Refill: 1    See Patient Instructions    VIKRAM Francois  [facility]

## 2017-09-18 NOTE — PATIENT INSTRUCTIONS
Thank you for choosing [facility].   I have office hours 8:00 am to 4:30 pm on Monday's, Wednesday's, Thursday's and Friday's. My nurse and I are out of the office every Tuesday.    Following your visit, when your labs and diagnostic testing have returned, I will review then and you will be contacted by my nurse.  If you are on My Chart, you can also view results there.    For refills, notify your pharmacy regarding what you need and the pharmacy will generate a refill request. Do not call my nurse as she is unable to process refill request. Please plan ahead and allow 3-5 days for refill requests.    You will generally receive a reminder call the day prior to your appointment.  If you cannot attend your appointment, please cancel your appointment with as much notice as possible.  If there is a pattern of failure to present for your appointments, I cannot provide consistent, meaningful, ongoing care for you. It is very important to me that you come in for your care, so we can best assist you with your health care needs.    IMPORTANT:  Please note that it is my standard of practice to NOT participate in prescribing ongoing requested Narcotic Analgesic therapy, and/or participate in the prescribing of other controlled substances.  My nurse and I am happy to assist you with the process of referral for alternative pain management as needed, and other treatment modalities including but not limited to:  Physical Therapy, Physical Medicine and Rehab, Counseling, Chiropractic Care, Orthopedic Care, and non-narcotic medication management.     In the event that you need to be seen for emergent concerns and I am out of office,  please see one of my colleagues for acute concerns.  You may also present to  or ER.  I appreciate the opportunity to serve you and look forward to supporting your healthcare needs in the future. Please contact me with any questions or concerns that you may  have.    Sincerely,      Faye Chang RN, PA-C

## 2017-09-18 NOTE — MR AVS SNAPSHOT
After Visit Summary   9/18/2017    Kamila Ma    MRN: [id number]           Patient Information     Date Of Birth          1995        Visit Information        Provider Department      9/18/2017 2:30 PM Faye Chang PA [facility]        Today's Diagnoses     Right acute serous otitis media, recurrence not specified    -  1      Care Instructions      Thank you for choosing [facility].   I have office hours 8:00 am to 4:30 pm on Monday's, Wednesday's, Thursday's and Friday's. My nurse and I are out of the office every Tuesday.    Following your visit, when your labs and diagnostic testing have returned, I will review then and you will be contacted by my nurse.  If you are on My Chart, you can also view results there.    For refills, notify your pharmacy regarding what you need and the pharmacy will generate a refill request. Do not call my nurse as she is unable to process refill request. Please plan ahead and allow 3-5 days for refill requests.    You will generally receive a reminder call the day prior to your appointment.  If you cannot attend your appointment, please cancel your appointment with as much notice as possible.  If there is a pattern of failure to present for your appointments, I cannot provide consistent, meaningful, ongoing care for you. It is very important to me that you come in for your care, so we can best assist you with your health care needs.    IMPORTANT:  Please note that it is my standard of practice to NOT participate in prescribing ongoing requested Narcotic Analgesic therapy, and/or participate in the prescribing of other controlled substances.  My nurse and I am happy to assist you with the process of referral for alternative pain management as needed, and other treatment modalities including but not limited to:  Physical Therapy, Physical Medicine and Rehab, Counseling, Chiropractic Care, Orthopedic Care, and non-narcotic  "medication management.     In the event that you need to be seen for emergent concerns and I am out of office,  please see one of my colleagues for acute concerns.  You may also present to  or ER.  I appreciate the opportunity to serve you and look forward to supporting your healthcare needs in the future. Please contact me with any questions or concerns that you may have.    Sincerely,      Faye Chang RN, PA-C               Follow-ups after your visit        Who to contact     If you have questions or need follow up information about today's clinic visit or your schedule please contact [facility] directly at [phone].  Normal or non-critical lab and imaging results will be communicated to you by MyChart, letter or phone within 4 business days after the clinic has received the results. If you do not hear from us within 7 days, please contact the clinic through Smart Platehart or phone. If you have a critical or abnormal lab result, we will notify you by phone as soon as possible.  Submit refill requests through Grafoid or call your pharmacy and they will forward the refill request to us. Please allow 3 business days for your refill to be completed.          Additional Information About Your Visit        MyChart Information     Grafoid lets you send messages to your doctor, view your test results, renew your prescriptions, schedule appointments and more. To sign up, go to [email or web address] . Click on \"Log in\" on the left side of the screen, which will take you to the Welcome page. Then click on \"Sign up Now\" on the right side of the page.     You will be asked to enter the access code listed below, as well as some personal information. Please follow the directions to create your username and password.     Your access code is: [email or web address]  Expires: 10/11/2017  3:29 PM     Your access code will  in 90 days. If you need help or a new code, please call your [facility] or [phone].   "      Care EveryWhere ID     This is your Care EveryWhere ID. This could be used by other organizations to access your [place] medical records  [id number]        Your Vitals Were     Pulse Temperature Pulse Oximetry Breastfeeding? BMI (Body Mass Index)       76 99.6  F (37.6  C) (Tympanic) 97% No 41.63 kg/m2        Blood Pressure from Last 3 Encounters:   09/18/17 108/64   08/10/17 112/68   07/13/17 112/74    Weight from Last 3 Encounters:   09/18/17 235 lb (106.6 kg)   08/10/17 233 lb (105.7 kg)   07/13/17 220 lb (99.8 kg)              Today, you had the following     No orders found for display         Today's Medication Changes          These changes are accurate as of: 9/18/17  2:58 PM.  If you have any questions, ask your nurse or doctor.               Start taking these medicines.        Dose/Directions    azithromycin 250 MG tablet   Commonly known as:  ZITHROMAX   Used for:  Right acute serous otitis media, recurrence not specified   Started by:  Faye Chang, PA        Two tablets first day, then one tablet daily for four days.   Quantity:  6 tablet   Refills:  1            Where to get your medicines      These medications were sent to [facility] [address] [facility] [place], [place] - [address] AT [facility] [address]  [address], [facility] [address]     Phone:  [phone]     azithromycin 250 MG tablet                Primary Care Provider Office Phone # Fax #    Sol ASHLEY Kim [phone]       [facility] [place] [address]  [place] [address]        Equal Access to Services     [facility] AH: Hadii alexandra gallagher hadasho Sorosas, waaxda luqadaha, qaybta kaalmada ademiriamyamilena, sachin buenrostro. So [facility] [phone].    ATENCIÓN: Si habla español, tiene a jean disposición servicios gratuitos de asistencia lingüística. Llame al [phone].    We comply with applicable federal civil rights laws and [place] laws. We do not discriminate on the basis  of race, color, national origin, age, disability sex, sexual orientation or gender identity.            Thank you!     [facility] you for [facility]  [facility] your [facility]. Our goal is always to provide you with excellent care. Hearing back from our patients is one way we can continue to improve our services. Please take a few minutes to complete the written survey that you may receive in the mail after your visit with us. Thank you!             Your Updated Medication List - Protect others around you: Learn how to safely use, store and throw away your medicines at www.disposemymeds.org.          This list is accurate as of: 9/18/17  2:58 PM.  Always use your most recent med list.                   Brand Name Dispense Instructions for use Diagnosis    azithromycin 250 MG tablet    ZITHROMAX    6 tablet    Two tablets first day, then one tablet daily for four days.    Right acute serous otitis media, recurrence not specified       ferrous sulfate 325 (65 FE) MG tablet    IRON    60 tablet    Take 1 tablet (325 mg) by mouth 2 times daily    Iron deficiency anemia secondary to inadequate dietary iron intake       FLINTSTONES COMPLETE PO      Take 1 tablet by mouth daily.        sodium chloride 0.65 % nasal spray    OCEAN    1 Bottle    Use 3-4 sprays in each nostril every hour while awake for 2 weeks, then use at least 4 times daily for 1 month    Post-operative state       VIACTIV PO      Take 1 tablet by mouth daily.

## 2017-09-18 NOTE — NURSING NOTE
"Chief Complaint   Patient presents with     Ear Problem     right ear        Initial /64 (BP Location: Left arm, Patient Position: Chair, Cuff Size: Adult Large)  Pulse 76  Temp 99.6  F (37.6  C) (Tympanic)  Wt 235 lb (106.6 kg)  SpO2 97%  Breastfeeding? No  BMI 41.63 kg/m2 Estimated body mass index is 41.63 kg/(m^2) as calculated from the following:    Height as of 8/10/17: 5' 3\" (1.6 m).    Weight as of this encounter: 235 lb (106.6 kg).  Medication Reconciliation: complete   Sujatha Walker CMA(AAMA)   "

## 2017-09-19 ASSESSMENT — ANXIETY QUESTIONNAIRES: GAD7 TOTAL SCORE: 0

## 2017-10-22 ENCOUNTER — HEALTH MAINTENANCE LETTER (OUTPATIENT)
Age: 22
End: 2017-10-22

## 2018-08-15 ENCOUNTER — OFFICE VISIT (OUTPATIENT)
Dept: OTOLARYNGOLOGY | Facility: OTHER | Age: 23
End: 2018-08-15
Attending: PHYSICIAN ASSISTANT
Payer: COMMERCIAL

## 2018-08-15 VITALS
BODY MASS INDEX: 41.64 KG/M2 | OXYGEN SATURATION: 99 % | SYSTOLIC BLOOD PRESSURE: 100 MMHG | HEIGHT: 63 IN | DIASTOLIC BLOOD PRESSURE: 66 MMHG | WEIGHT: 235 LBS | HEART RATE: 93 BPM | TEMPERATURE: 99 F

## 2018-08-15 DIAGNOSIS — H92.11 OTORRHEA, RIGHT: Primary | ICD-10-CM

## 2018-08-15 DIAGNOSIS — S09.92XA: ICD-10-CM

## 2018-08-15 DIAGNOSIS — Z86.69 HX OF CHRONIC OTITIS MEDIA: ICD-10-CM

## 2018-08-15 DIAGNOSIS — H90.6 MIXED HEARING LOSS, BILATERAL: ICD-10-CM

## 2018-08-15 DIAGNOSIS — Z98.890 H/O NASAL SEPTOPLASTY: ICD-10-CM

## 2018-08-15 DIAGNOSIS — L92.9 PRESENCE OF GRANULATION TISSUE: ICD-10-CM

## 2018-08-15 LAB
GRAM STN SPEC: ABNORMAL
SPECIMEN SOURCE: ABNORMAL

## 2018-08-15 PROCEDURE — 99213 OFFICE O/P EST LOW 20 MIN: CPT | Mod: 25 | Performed by: PHYSICIAN ASSISTANT

## 2018-08-15 PROCEDURE — 87205 SMEAR GRAM STAIN: CPT | Performed by: PHYSICIAN ASSISTANT

## 2018-08-15 PROCEDURE — 87070 CULTURE OTHR SPECIMN AEROBIC: CPT | Performed by: PHYSICIAN ASSISTANT

## 2018-08-15 PROCEDURE — 87102 FUNGUS ISOLATION CULTURE: CPT | Mod: 90 | Performed by: PHYSICIAN ASSISTANT

## 2018-08-15 PROCEDURE — 87186 SC STD MICRODIL/AGAR DIL: CPT | Performed by: PHYSICIAN ASSISTANT

## 2018-08-15 PROCEDURE — 87077 CULTURE AEROBIC IDENTIFY: CPT | Performed by: PHYSICIAN ASSISTANT

## 2018-08-15 PROCEDURE — 99000 SPECIMEN HANDLING OFFICE-LAB: CPT | Performed by: PHYSICIAN ASSISTANT

## 2018-08-15 PROCEDURE — 92504 EAR MICROSCOPY EXAMINATION: CPT | Performed by: PHYSICIAN ASSISTANT

## 2018-08-15 RX ORDER — CIPROFLOXACIN AND DEXAMETHASONE 3; 1 MG/ML; MG/ML
4 SUSPENSION/ DROPS AURICULAR (OTIC) 2 TIMES DAILY
Qty: 4 ML | Refills: 0 | Status: SHIPPED | OUTPATIENT
Start: 2018-08-15 | End: 2018-08-25

## 2018-08-15 ASSESSMENT — PAIN SCALES - GENERAL: PAINLEVEL: NO PAIN (0)

## 2018-08-15 NOTE — MR AVS SNAPSHOT
After Visit Summary   8/15/2018    Kamila Ma    MRN: [id number]           Patient Information     Date Of Birth          1995        Visit Information        Provider Department      8/15/2018 9:30 AM India Raymundo [facility]C [facility]        Care Instructions    Continue with nasal saline.  Mild soft tissue swelling - nose. This should resolve.  No significant obstruction     Start Ciprodex 4 drops twice a day for 10 days  Ear culture is pending.   Return in 2-3 weeks for ear check    Thank you for allowing VIKRAM Guzman and our ENT team to participate in your care.  If your medications are too expensive, please give the nurse a call.  We can possibly change this medication.  If you have a scheduling or an appointment question please contact [facility] Unit Coordinator at their direct line [phone].   ALL nursing questions or concerns can be directed to your ENT nurse at: [phone] [facility]               Follow-ups after your visit        Who to contact     If you have questions or need follow up information about today's clinic visit or your schedule please contact [facility] directly at [phone].  Normal or non-critical lab and imaging results will be communicated to you by MyChart, letter or phone within 4 business days after the clinic has received the results. If you do not hear from us within 7 days, please contact the clinic through MyChart or phone. If you have a critical or abnormal lab result, we will notify you by phone as soon as possible.  Submit refill requests through [employer] or call your pharmacy and they will forward the refill request to us. Please allow 3 business days for your refill to be completed.          Additional Information About Your Visit        Care EveryWhere ID     This is your Care EveryWhere ID. This could be used by other organizations to access your [facility] medical records  [id number]        Your Vitals Were      "Pulse Temperature Height Pulse Oximetry BMI (Body Mass Index)       93 99  F (37.2  C) (Tympanic) 5' 3\" (1.6 m) 99% 41.63 kg/m2        Blood Pressure from Last 3 Encounters:   08/15/18 100/66   09/18/17 108/64   08/10/17 112/68    Weight from Last 3 Encounters:   08/15/18 235 lb (106.6 kg)   09/18/17 235 lb (106.6 kg)   08/10/17 233 lb (105.7 kg)              Today, you had the following     No orders found for display       Primary Care Provider Office Phone # Fax #    Sol Kim [phone]       [facility] [place] [address]  [facility] [address]        Equal Access to Services     GELY HERNANDEZ : Hadii aad ku hadasho Sorosas, waaxda luqadaha, qaybta kaalmada adeegyada, sachin stahl . So [facility] [phone].    ATENCIÓN: Si habla español, tiene a jean disposición servicios gratuitos de asistencia lingüística. Llame al [phone].    We comply with applicable federal civil rights laws and [place] laws. We do not discriminate on the basis of race, color, national origin, age, disability, sex, sexual orientation, or gender identity.            Thank you!     Thank you for choosing [facility]  for your care. Our goal is always to provide you with excellent care. Hearing back from our patients is one way we can continue to improve our services. Please take a few minutes to complete the written survey that you may receive in the mail after your visit with us. Thank you!             Your Updated Medication List - Protect others around you: Learn how to safely use, store and throw away your medicines at www.disposemymeds.org.          This list is accurate as of 8/15/18  9:40 AM.  Always use your most recent med list.                   Brand Name Dispense Instructions for use Diagnosis    azithromycin 250 MG tablet    ZITHROMAX    6 tablet    Two tablets first day, then one tablet daily for four days.    Right acute serous otitis media, recurrence " not specified       ferrous sulfate 325 (65 Fe) MG tablet    IRON    60 tablet    Take 1 tablet (325 mg) by mouth 2 times daily    Iron deficiency anemia secondary to inadequate dietary iron intake       FLINTSTONES COMPLETE PO      Take 1 tablet by mouth daily.        sodium chloride 0.65 % nasal spray    OCEAN    1 Bottle    Use 3-4 sprays in each nostril every hour while awake for 2 weeks, then use at least 4 times daily for 1 month    Post-operative state       VIACTIV PO      Take 1 tablet by mouth daily.
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
## 2018-08-15 NOTE — PATIENT INSTRUCTIONS
Continue with nasal saline.  Mild soft tissue swelling - nose. This should resolve.  No significant obstruction     Start Ciprodex 4 drops twice a day for 10 days  Ear culture is pending.   Return in 2-3 weeks for ear check    Thank you for allowing VIKRAM Guzman and our ENT team to participate in your care.  If your medications are too expensive, please give the nurse a call.  We can possibly change this medication.  If you have a scheduling or an appointment question please contact [facility] Unit Coordinator at their direct line [phone].   ALL nursing questions or concerns can be directed to your ENT nurse at: [phone] [place]

## 2018-08-15 NOTE — NURSING NOTE
"Chief Complaint   Patient presents with     RECHECK     sinus check- s/p FESS, renetta, TR-6/13/17       Initial /66 (BP Location: Right arm, Patient Position: Chair)  Pulse 93  Temp 99  F (37.2  C) (Tympanic)  Ht 5' 3\" (1.6 m)  Wt 235 lb (106.6 kg)  SpO2 99%  BMI 41.63 kg/m2 Estimated body mass index is 41.63 kg/(m^2) as calculated from the following:    Height as of this encounter: 5' 3\" (1.6 m).    Weight as of this encounter: 235 lb (106.6 kg).  Medication Reconciliation: complete    Angelique Ragsdale LPN    "

## 2018-08-18 LAB
BACTERIA SPEC CULT: ABNORMAL
BACTERIA SPEC CULT: ABNORMAL
SPECIMEN SOURCE: ABNORMAL

## 2018-08-20 ENCOUNTER — OFFICE VISIT (OUTPATIENT)
Dept: FAMILY MEDICINE | Facility: OTHER | Age: 23
End: 2018-08-20
Attending: FAMILY MEDICINE
Payer: COMMERCIAL

## 2018-08-20 VITALS
TEMPERATURE: 99.2 F | HEIGHT: 63 IN | DIASTOLIC BLOOD PRESSURE: 64 MMHG | BODY MASS INDEX: 39.9 KG/M2 | HEART RATE: 72 BPM | OXYGEN SATURATION: 98 % | SYSTOLIC BLOOD PRESSURE: 108 MMHG | WEIGHT: 225.2 LBS

## 2018-08-20 DIAGNOSIS — R30.0 DYSURIA: Primary | ICD-10-CM

## 2018-08-20 LAB
ALBUMIN UR-MCNC: ABNORMAL MG/DL
APPEARANCE UR: ABNORMAL
BACTERIA #/AREA URNS HPF: ABNORMAL /HPF
BILIRUB UR QL STRIP: NEGATIVE
COLOR UR AUTO: YELLOW
GLUCOSE UR STRIP-MCNC: NEGATIVE MG/DL
HGB UR QL STRIP: ABNORMAL
KETONES UR STRIP-MCNC: NEGATIVE MG/DL
LEUKOCYTE ESTERASE UR QL STRIP: ABNORMAL
NITRATE UR QL: NEGATIVE
NON-SQ EPI CELLS #/AREA URNS LPF: ABNORMAL /LPF
PH UR STRIP: 7 PH (ref 5–7)
RBC #/AREA URNS AUTO: ABNORMAL /HPF
SOURCE: ABNORMAL
SP GR UR STRIP: 1.02 (ref 1–1.03)
UROBILINOGEN UR STRIP-ACNC: 0.2 EU/DL (ref 0.2–1)
WBC #/AREA URNS AUTO: ABNORMAL /HPF

## 2018-08-20 PROCEDURE — 87086 URINE CULTURE/COLONY COUNT: CPT | Performed by: FAMILY MEDICINE

## 2018-08-20 PROCEDURE — 99213 OFFICE O/P EST LOW 20 MIN: CPT | Performed by: FAMILY MEDICINE

## 2018-08-20 PROCEDURE — 81001 URINALYSIS AUTO W/SCOPE: CPT | Performed by: FAMILY MEDICINE

## 2018-08-20 RX ORDER — NITROFURANTOIN 25; 75 MG/1; MG/1
100 CAPSULE ORAL 2 TIMES DAILY
Qty: 14 CAPSULE | Refills: 0 | Status: SHIPPED | OUTPATIENT
Start: 2018-08-20 | End: 2018-08-24

## 2018-08-20 RX ORDER — LEVONORGESTREL AND ETHINYL ESTRADIOL 100-20(84)
1 KIT ORAL DAILY
COMMUNITY
End: 2019-03-21

## 2018-08-20 ASSESSMENT — ANXIETY QUESTIONNAIRES
GAD7 TOTAL SCORE: 1
7. FEELING AFRAID AS IF SOMETHING AWFUL MIGHT HAPPEN: NOT AT ALL
6. BECOMING EASILY ANNOYED OR IRRITABLE: NOT AT ALL
3. WORRYING TOO MUCH ABOUT DIFFERENT THINGS: NOT AT ALL
2. NOT BEING ABLE TO STOP OR CONTROL WORRYING: NOT AT ALL
5. BEING SO RESTLESS THAT IT IS HARD TO SIT STILL: NOT AT ALL
1. FEELING NERVOUS, ANXIOUS, OR ON EDGE: SEVERAL DAYS

## 2018-08-20 ASSESSMENT — PAIN SCALES - GENERAL: PAINLEVEL: MODERATE PAIN (4)

## 2018-08-20 ASSESSMENT — PATIENT HEALTH QUESTIONNAIRE - PHQ9: 5. POOR APPETITE OR OVEREATING: NOT AT ALL

## 2018-08-20 NOTE — PROGRESS NOTES
SUBJECTIVE:   Kamila Ma is a 23 year old female who presents to clinic today for the following health issues:        URINARY TRACT SYMPTOMS      Duration: 1 week    Description  dysuria, frequency, odor, nocturia x 1 and back pain    Intensity:  moderate    Accompanying signs and symptoms:  Fever/chills:  low grade temp  Flank pain YES  Nausea and vomiting: no   Vaginal symptoms: none  Abdominal/Pelvic Pain: YES- Sometimes    History  History of frequent UTI's: no   History of kidney stones: no   Sexually Active: YES  Possibility of pregnancy: Does not think so    Precipitating or alleviating factors: None    Therapies tried and outcome: on cipro dexotic gtts for right ear infection         PROBLEMS TO ADD ON...    Problem list and histories reviewed & adjusted, as indicated.  Additional history: remote hx of UTI.      Patient Active Problem List   Diagnosis     Mixed hearing loss     Acute recurrent maxillary sinusitis     ACP (advance care planning)     Other iron deficiency anemia     Bariatric surgery status     Vasovagal near syncope     Crouzon craniofacial dysostosis     Morbid obesity (H)     Past Surgical History:   Procedure Laterality Date     ADENOIDECTOMY       GI SURGERY      [facility]      MOUTH SURGERY  8/2011    wisdom teeth extracted     PET placement       sagital suture release  6/1994     SEPTOPLASTY, TURBINOPLASTY, COMBINED N/A 6/13/2017    Procedure: COMBINED SEPTOPLASTY, TURBINOPLASTY;  SEPTOPLASTY, TURBINATE REDUCTION;  Surgeon: Gina Casillas MD;  Location: [facility] OR     TONSILLECTOMY         Social History   Substance Use Topics     Smoking status: Never Smoker     Smokeless tobacco: Never Used     Alcohol use No     Family History   Problem Relation Age of Onset     Hypertension Mother      Migraines Mother      Diabetes Paternal Grandfather      Hypertension Maternal Uncle      Hypertension Maternal Grandfather      Hypertension Maternal Grandmother      Depression Sister   "    Unknown/Adopted Paternal Grandmother          Current Outpatient Prescriptions   Medication Sig Dispense Refill     Calcium-Vitamin D-Vitamin K (VIACTIV PO) Take 1 tablet by mouth daily.        ciprofloxacin-dexamethasone (CIPRODEX) otic suspension Place 4 drops into the right ear 2 times daily for 10 days 4 mL 0     ferrous sulfate (IRON) 325 (65 FE) MG tablet Take 1 tablet (325 mg) by mouth 2 times daily 60 tablet 1     levonorgest-eth estrad 91-day (LOSEASONIQUE) 0.1-0.02 & 0.01 MG per tablet Take 1 tablet by mouth daily       nitroFURantoin, macrocrystal-monohydrate, (MACROBID) 100 MG capsule Take 1 capsule (100 mg) by mouth 2 times daily 14 capsule 0     Pediatric Multivit-Minerals-C (FLINTSTONES COMPLETE PO) Take 1 tablet by mouth daily.        Allergies   Allergen Reactions     Sulfa Drugs Hives     SULFA (SULFONAMIDE ANTIBIOTICS)     Topamax Diarrhea       Reviewed and updated as needed this visit by clinical staff       Reviewed and updated as needed this visit by Provider         ROS:  Constitutional, HEENT, cardiovascular, pulmonary, gi and gu systems are negative, except as otherwise noted.    OBJECTIVE:                                                    /64  Pulse 72  Temp 99.2  F (37.3  C) (Tympanic)  Ht 5' 3\" (1.6 m)  Wt 225 lb 3.2 oz (102.2 kg)  SpO2 98%  BMI 39.89 kg/m2  Body mass index is 39.89 kg/(m^2).  GENERAL APPEARANCE: Alert, no acute distress  Back:  No cva tenderness.    ABDOMEN: normal bowel sounds, soft, nontender, no hepatosplenomegaly or other masses  SKIN: no suspicious lesions or rashes to visualized skin  NEURO: Alert, oriented x 3, speech and mentation normal      UA reviewed.  Questionably positive.       ASSESSMENT/PLAN:                                                    1. Dysuria  Discussed.  Borderline UA.  Start macrobid and culture the urine.  She understands.    - *UA reflex to Microscopic and Culture - [place] [facility]  - Urine Microscopic  - nitroFURantoin, " macrocrystal-monohydrate, (MACROBID) 100 MG capsule; Take 1 capsule (100 mg) by mouth 2 times daily  Dispense: 14 capsule; Refill: 0  - Urine Culture Aerobic Bacterial          Kashif Rinaldi MD  [facility]

## 2018-08-20 NOTE — MR AVS SNAPSHOT
"              After Visit Summary   8/20/2018    Kamila Ma    MRN: [id number]           Patient Information     Date Of Birth          1995        Visit Information        Provider Department      8/20/2018 4:00 PM Kashif Rinaldi MD [facility]        Today's Diagnoses     Dysuria    -  1      Care Instructions    F/u with ongoing concerns.             Follow-ups after your visit        Your next 10 appointments already scheduled     Aug 31, 2018  8:00 AM CDT   (Arrive by 7:45 AM)   Return Visit with India Raymundo PA-C   [facility] ([facility] [place] )    [address]  [place] [address]   [phone]              Who to contact     If you have questions or need follow up information about today's clinic visit or your schedule please contact [facility] directly at [phone].  Normal or non-critical lab and imaging results will be communicated to you by MyChart, letter or phone within 4 business days after the clinic has received the results. If you do not hear from us within 7 days, please contact the clinic through MyChart or phone. If you have a critical or abnormal lab result, we will notify you by phone as soon as possible.  Submit refill requests through [employer] or call your pharmacy and they will forward the refill request to us. Please allow 3 business days for your refill to be completed.          Additional Information About Your Visit        Care EveryWhere ID     This is your Care EveryWhere ID. This could be used by other organizations to access your [facility] medical records  [id number]        Your Vitals Were     Pulse Temperature Height Pulse Oximetry BMI (Body Mass Index)       72 99.2  F (37.3  C) (Tympanic) 5' 3\" (1.6 m) 98% 39.89 kg/m2        Blood Pressure from Last 3 Encounters:   08/20/18 108/64   08/15/18 100/66   09/18/17 108/64    Weight from Last 3 Encounters:   08/20/18 225 lb 3.2 oz (102.2 kg)   08/15/18 235 " lb (106.6 kg)   09/18/17 235 lb (106.6 kg)              We Performed the Following     *UA reflex to Microscopic and Culture - [facility]     Urine Culture Aerobic Bacterial     Urine Microscopic          Today's Medication Changes          These changes are accurate as of 8/20/18  5:12 PM.  If you have any questions, ask your nurse or doctor.               Start taking these medicines.        Dose/Directions    nitroFURantoin (macrocrystal-monohydrate) 100 MG capsule   Commonly known as:  MACROBID   Used for:  Dysuria        Dose:  100 mg   Take 1 capsule (100 mg) by mouth 2 times daily   Quantity:  14 capsule   Refills:  0         Stop taking these medicines if you haven't already. Please contact your care team if you have questions.     azithromycin 250 MG tablet   Commonly known as:  ZITHROMAX           sodium chloride 0.65 % nasal spray   Commonly known as:  OCEAN                Where to get your medicines      These medications were sent to [id number] [place] [place] - [address] AT [facility] OF [facility] [address], [place] [address]     Phone:  [phone]     nitroFURantoin (macrocrystal-monohydrate) 100 MG capsule                Primary Care Provider Office Phone # Fax #    Sol Kim [phone]       [facility] [place] [address]  [place] [address]        Equal Access to Services     [facility] AH: Hadii aad ku hadasho Soomaali, waaxda luqadaha, qaybta kaalmada adeegyada, waxay nonain hayaan luis armando stahl ah. So [facility] [phone].    ATENCIÓN: Si habla español, tiene a jean disposición servicios gratuitos de asistencia lingüística. Llame al [phone].    We comply with applicable federal civil rights laws and [place] laws. We do not discriminate on the basis of race, color, national origin, age, disability, sex, sexual orientation, or gender identity.            Thank you!     Thank you for choosing [facility]  for  your care. Our goal is always to provide you with excellent care. Hearing back from our patients is one way we can continue to improve our services. Please take a few minutes to complete the written survey that you may receive in the mail after your visit with us. Thank you!             Your Updated Medication List - Protect others around you: Learn how to safely use, store and throw away your medicines at www.disposemymeds.org.          This list is accurate as of 8/20/18  5:12 PM.  Always use your most recent med list.                   Brand Name Dispense Instructions for use Diagnosis    ciprofloxacin-dexamethasone otic suspension    CIPRODEX    4 mL    Place 4 drops into the right ear 2 times daily for 10 days    Otorrhea, right       ferrous sulfate 325 (65 Fe) MG tablet    IRON    60 tablet    Take 1 tablet (325 mg) by mouth 2 times daily    Iron deficiency anemia secondary to inadequate dietary iron intake       FLINTSTONES COMPLETE PO      Take 1 tablet by mouth daily.        levonorgest-eth estrad 91-day 0.1-0.02 & 0.01 MG per tablet    LoSEASONIQUE     Take 1 tablet by mouth daily        nitroFURantoin (macrocrystal-monohydrate) 100 MG capsule    MACROBID    14 capsule    Take 1 capsule (100 mg) by mouth 2 times daily    Dysuria       VIACTIV PO      Take 1 tablet by mouth daily.

## 2018-08-20 NOTE — NURSING NOTE
"Chief Complaint   Patient presents with     Dysuria       Initial /64  Pulse 72  Temp 99.2  F (37.3  C) (Tympanic)  Ht 5' 3\" (1.6 m)  Wt 225 lb 3.2 oz (102.2 kg)  SpO2 98%  BMI 39.89 kg/m2 Estimated body mass index is 39.89 kg/(m^2) as calculated from the following:    Height as of this encounter: 5' 3\" (1.6 m).    Weight as of this encounter: 225 lb 3.2 oz (102.2 kg).  Medication Reconciliation: complete    Sarah Cano LPN  "

## 2018-08-21 ASSESSMENT — PATIENT HEALTH QUESTIONNAIRE - PHQ9: SUM OF ALL RESPONSES TO PHQ QUESTIONS 1-9: 1

## 2018-08-21 ASSESSMENT — ANXIETY QUESTIONNAIRES: GAD7 TOTAL SCORE: 1

## 2018-08-22 LAB
BACTERIA SPEC CULT: ABNORMAL
Lab: ABNORMAL
SPECIMEN SOURCE: ABNORMAL

## 2018-08-24 ENCOUNTER — HOSPITAL ENCOUNTER (EMERGENCY)
Facility: HOSPITAL | Age: 23
Discharge: HOME | End: 2018-08-24
Attending: NURSE PRACTITIONER | Admitting: NURSE PRACTITIONER
Payer: COMMERCIAL

## 2018-08-24 ENCOUNTER — TELEPHONE (OUTPATIENT)
Dept: FAMILY MEDICINE | Facility: OTHER | Age: 23
End: 2018-08-24

## 2018-08-24 VITALS
RESPIRATION RATE: 16 BRPM | DIASTOLIC BLOOD PRESSURE: 78 MMHG | OXYGEN SATURATION: 99 % | TEMPERATURE: 99.3 F | SYSTOLIC BLOOD PRESSURE: 124 MMHG | HEART RATE: 73 BPM

## 2018-08-24 DIAGNOSIS — N30.01 ACUTE CYSTITIS WITH HEMATURIA: ICD-10-CM

## 2018-08-24 LAB
ALBUMIN UR-MCNC: 10 MG/DL
APPEARANCE UR: ABNORMAL
BACTERIA #/AREA URNS HPF: ABNORMAL /HPF
BILIRUB UR QL STRIP: NEGATIVE
COLOR UR AUTO: YELLOW
GLUCOSE UR STRIP-MCNC: NEGATIVE MG/DL
HGB UR QL STRIP: ABNORMAL
KETONES UR STRIP-MCNC: NEGATIVE MG/DL
LEUKOCYTE ESTERASE UR QL STRIP: ABNORMAL
NITRATE UR QL: NEGATIVE
PH UR STRIP: 6 PH (ref 4.7–8)
RBC #/AREA URNS AUTO: 40 /HPF (ref 0–2)
SOURCE: ABNORMAL
SP GR UR STRIP: 1.02 (ref 1–1.03)
UROBILINOGEN UR STRIP-MCNC: NORMAL MG/DL (ref 0–2)
WBC #/AREA URNS AUTO: >182 /HPF (ref 0–5)

## 2018-08-24 PROCEDURE — 87086 URINE CULTURE/COLONY COUNT: CPT | Performed by: NURSE PRACTITIONER

## 2018-08-24 PROCEDURE — 81001 URINALYSIS AUTO W/SCOPE: CPT | Performed by: NURSE PRACTITIONER

## 2018-08-24 PROCEDURE — 99213 OFFICE O/P EST LOW 20 MIN: CPT | Performed by: NURSE PRACTITIONER

## 2018-08-24 PROCEDURE — G0463 HOSPITAL OUTPT CLINIC VISIT: HCPCS

## 2018-08-24 RX ORDER — CIPROFLOXACIN 500 MG/1
500 TABLET, FILM COATED ORAL 2 TIMES DAILY
Qty: 14 TABLET | Refills: 0 | Status: SHIPPED | OUTPATIENT
Start: 2018-08-24 | End: 2018-08-31

## 2018-08-24 ASSESSMENT — ENCOUNTER SYMPTOMS
DYSURIA: 1
MUSCULOSKELETAL NEGATIVE: 1
ABDOMINAL PAIN: 0
CONSTITUTIONAL NEGATIVE: 1
HEMATURIA: 1

## 2018-08-24 NOTE — ED TRIAGE NOTES
Pt presents with pain with urination, bleeding with wiping and in her urine since today. Has had other UTI sx since 1 week ago such asback pain, burning, foul smell to urine and was prescribed Macrobid.

## 2018-08-24 NOTE — ED AVS SNAPSHOT
[facility]    [address] [place] [address]    Phone:  [phone]                                       Kamila Ma   MRN: [id number]    Department:  [facility]   Date of Visit:  8/24/2018           After Visit Summary Signature Page     I have received my discharge instructions, and my questions have been answered. I have discussed any challenges I see with this plan with the nurse or doctor.    ..........................................................................................................................................  Patient/Patient Representative Signature      ..........................................................................................................................................  Patient Representative Print Name and Relationship to Patient    ..................................................               ................................................  Date                                            Time    ..........................................................................................................................................  Reviewed by Signature/Title    ...................................................              ..............................................  Date                                                            Time          22EPIC Rev 08/18

## 2018-08-24 NOTE — DISCHARGE INSTRUCTIONS
Eat yogurt daily while on antibiotics.  Push fluids. Take medications as directed.   See PCP if symptoms not completely resolved in 3 days, sooner if symptoms worsen.   Return here if concerns develop.

## 2018-08-24 NOTE — TELEPHONE ENCOUNTER
Spoke with patient she will see if any openings available in [place] today, if not she might go to [facility].

## 2018-08-24 NOTE — ED TRIAGE NOTES
Pt presents with c/o being dx with a UTI on Monday, prescribed Macrobid and states it was getting better but urinating blood today, denies any pain.

## 2018-08-24 NOTE — TELEPHONE ENCOUNTER
10:13 AM    Reason for Call: Phone Call    Description: pt saw Dr Rinaldi on Mon for kidney infection and said she is not getting any better. Does she need to come in again or can you call in a different antibiotic.    Was an appointment offered for this call?   If yes : Appointment type              Date    Preferred method for responding to this message: telephone  What is your phone number ?[phone]    If we cannot reach you directly, may we leave a detailed response at the number you provided? {YES    Can this message wait until your PCP/provider returns, if available today?     Barbara Nguyen

## 2018-08-24 NOTE — ED AVS SNAPSHOT
[facility]    [address]    [facility] [place] [address]    Phone:  [phone]                                       Kamila Ma   MRN: [id number]    Department:  [facility]   Date of Visit:  8/24/2018           Patient Information     Date Of Birth          1995        Your diagnoses for this visit were:     Acute cystitis with hematuria        You were seen by Juliann Rinaldi NP.      Follow-up Information     Follow up with Sol Kim MD In 3 days.    Specialty:  Family Practice    Why:  if not improving    Contact information:    [facility] [place]  [address]  [place] [address] [phone]          Follow up with [facility].    Specialty:  EMERGENCY MEDICINE    Why:  If symptoms worsen    Contact information:    [address] [address] [phone]    Additional information:    From [facility]: Take [address]. Turn left at [address]. Turn left at the first stoplight on [address]. At the first stop sign, take a right onto [address]. Take a left into the parking lot and continue through until you reach the North enterance of the building.       From [place]: Take [address]. Take the [address] ramp towards [place]. Turn left onto [address]. Take a slight right onto [address]. Turn left at the first stoplight on [address]. At the first stop sign, take a right onto [address]. Take a left into the parking lot and continue through until you reach the North enterance of the building.       From [place]: Take [address]. Take a right at [address]. At the first stop sign, take a right onto [address]. Take a left into the parking lot and continue through until you reach the North enterance of the building.         Discharge Instructions       Eat yogurt daily while on antibiotics.  Push fluids. Take medications as  directed.   See PCP if symptoms not completely resolved in 3 days, sooner if symptoms worsen.   Return here if concerns develop.       Discharge References/Attachments     BLADDER INFECTION, FEMALE (ADULT) (ENGLISH)      Your next 10 appointments already scheduled     Aug 31, 2018  8:00 AM CDT   (Arrive by 7:45 AM)   Return Visit with India Raymundo PA-C   [facility] [place] ([facility] [place] )    [address]  [place] [address]   [phone]                 Review of your medicines      START taking        Dose / Directions Last dose taken    ciprofloxacin 500 MG tablet   Commonly known as:  CIPRO   Dose:  500 mg   Quantity:  14 tablet        Take 1 tablet (500 mg) by mouth 2 times daily for 7 days   Refills:  0          Our records show that you are taking the medicines listed below. If these are incorrect, please call your family doctor or clinic.        Dose / Directions Last dose taken    ciprofloxacin-dexamethasone otic suspension   Commonly known as:  CIPRODEX   Dose:  4 drop   Quantity:  4 mL        Place 4 drops into the right ear 2 times daily for 10 days   Refills:  0        ferrous sulfate 325 (65 Fe) MG tablet   Commonly known as:  IRON   Dose:  325 mg   Quantity:  60 tablet        Take 1 tablet (325 mg) by mouth 2 times daily   Refills:  1        FLINTSTONES COMPLETE PO   Dose:  1 tablet        Take 1 tablet by mouth daily.   Refills:  0        levonorgest-eth estrad 91-day 0.1-0.02 & 0.01 MG per tablet   Commonly known as:  LoSEASONIQUE   Dose:  1 tablet        Take 1 tablet by mouth daily   Refills:  0        VIACTIV PO   Dose:  1 tablet        Take 1 tablet by mouth daily.   Refills:  0          STOP taking        Dose Reason for stopping Comments    nitroFURantoin (macrocrystal-monohydrate) 100 MG capsule   Commonly known as:  MACROBID                      Prescriptions were sent or printed at these locations (1 Prescription)                   [id number] -  [place] - [address] AT [facility] OF  [address]   [address], [place] [address]    Telephone:  [phone]   Fax:  [phone]   Hours:                  E-Prescribed (1 of 1)         ciprofloxacin (CIPRO) 500 MG tablet                Procedures and tests performed during your visit     UA reflex to Microscopic and Culture    Urine Culture Aerobic Bacterial      Orders Needing Specimen Collection     None      Pending Results     Date and Time Order Name Status Description    8/24/2018 1723 Urine Culture Aerobic Bacterial In process             Pending Culture Results     Date and Time Order Name Status Description    8/24/2018 1723 Urine Culture Aerobic Bacterial In process             Thank you for choosing [facility]       Thank you for choosing [facility] for your care. Our goal is always to provide you with excellent care. Hearing back from our patients is one way we can continue to improve our services. Please take a few minutes to complete the written survey that you may receive in the mail after you visit with us. Thank you!        Care EveryWhere ID     This is your Care EveryWhere ID. This could be used by other organizations to access your [facility] medical records  [id number]        Equal Access to Services     GELY HERNANDEZ : Hadyumiko Stark, wajavy robles, qaybjimmy hamiltonalkadeem bone, sachin stahl . So [facility] [phone].    ATENCIÓN: Si habla español, tiene a jean disposición servicios gratuitos de asistencia lingüística. Llame al [phone].    We comply with applicable federal civil rights laws and [place] laws. We do not discriminate on the basis of race, color, national origin, age, disability, sex, sexual orientation, or gender identity.            After Visit Summary       This is your record. Keep this with you and show to your community pharmacist(s) and doctor(s) at your next visit.

## 2018-08-24 NOTE — ED PROVIDER NOTES
History     Chief Complaint   Patient presents with     UTI     The history is provided by the patient. No  was used.     Kamila Ma is a 23 year old female who presents with painful urination and hematuria. Was treated with Macrobid last week for UTI, has 4 days left. Was improving for a few days then much worse last night. No fever, no belly ache, no back pain.     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  Kamila Ma has no plan or code status on file. Discussed available resources and provided with information. Confirmed code status reflects current choices pending further ACP discussions.  Confirmed/documented legally designated decision makers.  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     Diarrhea 01/01/2011     Hearing loss 01/01/2011     Mixed hearing loss 12/4/2015     Multiple allergies 01/01/2011     Other iron deficiency anemia 7/18/2016     Otomycosis of right ear 09/10/2012     Vasovagal near syncope 7/18/2016       Past Surgical History:    Past Surgical History:   Procedure Laterality Date      ADENOIDECTOMY       GI SURGERY      VSG      MOUTH SURGERY  8/2011    wisdom teeth extracted     PET placement       sagital suture release  6/1994     SEPTOPLASTY, TURBINOPLASTY, COMBINED N/A 6/13/2017    Procedure: COMBINED SEPTOPLASTY, TURBINOPLASTY;  SEPTOPLASTY, TURBINATE REDUCTION;  Surgeon: Gina Casillas MD;  Location: [facility]     TONSILLECTOMY         Family History:    Family History   Problem Relation Age of Onset     Hypertension Mother      Migraines Mother      Diabetes Paternal Grandfather      Hypertension Maternal Uncle      Hypertension Maternal Grandfather      Hypertension Maternal Grandmother      Depression Sister      Unknown/Adopted Paternal Grandmother        Social History:  Marital Status:  Single [1]  Social History   Substance Use Topics     Smoking status: Never Smoker     Smokeless tobacco: Never Used     Alcohol use No        Medications:      Calcium-Vitamin D-Vitamin K (VIACTIV PO)   ciprofloxacin (CIPRO) 500 MG tablet   ciprofloxacin-dexamethasone (CIPRODEX) otic suspension   ferrous sulfate (IRON) 325 (65 FE) MG tablet   levonorgest-eth estrad 91-day (LOSEASONIQUE) 0.1-0.02 & 0.01 MG per tablet   Pediatric Multivit-Minerals-C (FLINTSTONES COMPLETE PO)         Review of Systems   Constitutional: Negative.    Gastrointestinal: Negative for abdominal pain.   Genitourinary: Positive for dysuria and hematuria.   Musculoskeletal: Negative.        Physical Exam   BP: 124/78  Pulse: 73  Temp: 99.3  F (37.4  C)  Resp: 16  SpO2: 99 %      Physical Exam   Constitutional: She is oriented to person, place, and time. She appears well-developed and well-nourished. No distress.   HENT:   Head: Normocephalic and atraumatic.   Cardiovascular: Normal rate, regular rhythm and normal heart sounds.    Pulmonary/Chest: Effort normal and breath sounds normal.   Abdominal: Soft. Bowel sounds are normal. She exhibits no distension. There is no tenderness. There is no CVA tenderness.    Musculoskeletal: Normal range of motion.   Neurological: She is alert and oriented to person, place, and time.   Skin: Skin is warm, dry and intact. She is not diaphoretic.   Nursing note and vitals reviewed.      ED Course     ED Course     Procedures     Results for orders placed or performed during the hospital encounter of 08/24/18 (from the past 24 hour(s))   UA reflex to Microscopic and Culture   Result Value Ref [facility]    Color Urine Yellow     Appearance Urine Slightly Cloudy     Glucose Urine Negative NEG^Negative mg/dL    Bilirubin Urine Negative NEG^Negative    Ketones Urine Negative NEG^Negative mg/dL    Specific Gravity Urine 1.016 1.003 - 1.035    Blood Urine Moderate (A) NEG^Negative    pH Urine 6.0 4.7 - 8.0 pH    Protein Albumin Urine 10 (A) NEG^Negative mg/dL    Urobilinogen mg/dL Normal 0.0 - 2.0 mg/dL    Nitrite Urine Negative NEG^Negative    Leukocyte Esterase Urine Large (A) NEG^Negative    Source Midstream Urine     RBC Urine 40 (H) 0 - 2 /HPF    WBC Urine >182 (H) 0 - 5 /HPF    Bacteria Urine Few (A) NEG^Negative /HPF       Medications - No data to display    Assessments & Plan (with Medical Decision Making)     I have reviewed the nursing notes.  I have reviewed the findings, diagnosis, plan and need for follow up with the patient.  Positive UA. Culture pending. Afebrile, benign exam.   Will stop macrobid, change to cipro. Advised to push fluids, rest.   Offered pyridium, pt declined.   Given Epic educational materials.     New Prescriptions    CIPROFLOXACIN (CIPRO) 500 MG TABLET    Take 1 tablet (500 mg) by mouth 2 times daily for 7 days       Final diagnoses:   Acute cystitis with hematuria     Eat yogurt daily while on abx.   Push fluids. Take medications as directed.   See PCP if symptoms not completely resolved in 3 days, sooner if symptoms worsen.   Return here if concerns develop.     8/24/2018   [facility]     Juliann Rinaldi NP  08/24/18 [id number]

## 2018-08-26 LAB
BACTERIA SPEC CULT: ABNORMAL
SPECIMEN SOURCE: ABNORMAL

## 2018-09-12 LAB
FUNGUS SPEC CULT: NORMAL
SPECIMEN SOURCE: NORMAL

## 2018-10-11 ENCOUNTER — HOSPITAL ENCOUNTER (EMERGENCY)
Facility: HOSPITAL | Age: 23
Discharge: HOME | End: 2018-10-11
Attending: PHYSICIAN ASSISTANT | Admitting: PHYSICIAN ASSISTANT
Payer: COMMERCIAL

## 2018-10-11 ENCOUNTER — TELEPHONE (OUTPATIENT)
Dept: FAMILY MEDICINE | Facility: OTHER | Age: 23
End: 2018-10-11

## 2018-10-11 VITALS
OXYGEN SATURATION: 100 % | SYSTOLIC BLOOD PRESSURE: 128 MMHG | HEART RATE: 56 BPM | TEMPERATURE: 99.4 F | DIASTOLIC BLOOD PRESSURE: 93 MMHG | RESPIRATION RATE: 16 BRPM

## 2018-10-11 DIAGNOSIS — J01.80 OTHER ACUTE SINUSITIS, RECURRENCE NOT SPECIFIED: ICD-10-CM

## 2018-10-11 DIAGNOSIS — J20.8 ACUTE BRONCHITIS DUE TO OTHER SPECIFIED ORGANISMS: ICD-10-CM

## 2018-10-11 PROCEDURE — 99213 OFFICE O/P EST LOW 20 MIN: CPT | Performed by: PHYSICIAN ASSISTANT

## 2018-10-11 PROCEDURE — G0463 HOSPITAL OUTPT CLINIC VISIT: HCPCS

## 2018-10-11 ASSESSMENT — ENCOUNTER SYMPTOMS
WHEEZING: 1
EYE DISCHARGE: 0
SORE THROAT: 0
FATIGUE: 1
VOICE CHANGE: 0
SINUS PAIN: 1
CARDIOVASCULAR NEGATIVE: 1
FEVER: 0
DIZZINESS: 0
ABDOMINAL PAIN: 0
NAUSEA: 0
COUGH: 1
DIARRHEA: 0
EYE REDNESS: 0
SINUS PRESSURE: 1
PSYCHIATRIC NEGATIVE: 1
VOMITING: 0
TROUBLE SWALLOWING: 0
NECK PAIN: 0
APPETITE CHANGE: 0
LIGHT-HEADEDNESS: 0
NECK STIFFNESS: 0
HEADACHES: 0

## 2018-10-11 NOTE — TELEPHONE ENCOUNTER
9:14 AM    Reason for Call: OVERBOOK    Patient is having the following symptoms: Pt thinks she has bronchitis, upper chest is tight, sinus issues, for 1 weeks. Pt states she is just getting worse.    The patient is requesting an appointment for today with Dr. Kim.    Was an appointment offered for this call? No, because I couldn't squeeze her in today  If yes : Appointment type              Date    Preferred method for responding to this message: Telephone Call  What is your phone number ?[phone]    If we cannot reach you directly, may we leave a detailed response at the number you provided? Yes    Can this message wait until your PCP/provider returns, if unavailable today? Not applicable, PCP is in.    Feli Hollins

## 2018-10-11 NOTE — ED AVS SNAPSHOT
[facility]    [address]    [place] [address]    Phone:  [phone]                                       Kamila Ma   MRN: [id number]    Department:  [facility]   Date of Visit:  10/11/2018           Patient Information     Date Of Birth          1995        Your diagnoses for this visit were:     Acute bronchitis due to other specified organisms     Other acute sinusitis, recurrence not specified        You were seen by Yuni Dowd PA.      Follow-up Information     Follow up with Sol Kim MD.    Specialty:  Family Practice    Why:  If symptoms worsen    Contact information:    [facility] [place]  [address]  [place] [address] [phone]          Follow up with [facility].    Specialty:  EMERGENCY MEDICINE    Why:  If further concerns develop    Contact information:    [address]  [place] [address] [phone]    Additional information:    From [place]: Take [address]. Turn left at [address]. Turn left at the first stoplight on [address]. At the first stop sign, take a right onto [address]. Take a left into the parking lot and continue through until you reach the North enterance of the building.       From [place]: Take [address]. Take the [address] ramp towards [place]. Turn left onto [address]. Take a slight right onto [address]. Turn left at the first stoplight on [address]. At the first stop sign, take a right onto [address]. Take a left into the parking lot and continue through until you reach the North enterance of the building.       From [place]: Take [address]. Take a right at [address]. At the first stop sign, take a right onto [address]. Take a left into the parking lot and continue through until you reach the North enterance of the building.       Discharge References/Attachments      BRONCHITIS, ANTIBIOTIC TREATMENT (ADULT) (ENGLISH)    SINUSITIS (ANTIBIOTIC TREATMENT) (ENGLISH)         Review of your medicines      START taking        Dose / Directions Last dose taken    amoxicillin-clavulanate 875-125 MG per tablet   Commonly known as:  AUGMENTIN   Dose:  1 tablet   Quantity:  20 tablet        Take 1 tablet by mouth 2 times daily   Refills:  0          Our records show that you are taking the medicines listed below. If these are incorrect, please call your family doctor or clinic.        Dose / Directions Last dose taken    ferrous sulfate 325 (65 Fe) MG tablet   Commonly known as:  IRON   Dose:  325 mg   Quantity:  60 tablet        Take 1 tablet (325 mg) by mouth 2 times daily   Refills:  1        FLINTSTONES COMPLETE PO   Dose:  1 tablet        Take 1 tablet by mouth daily.   Refills:  0        levonorgest-eth estrad 91-day 0.1-0.02 & 0.01 MG per tablet   Commonly known as:  LoSEASONIQUE   Dose:  1 tablet        Take 1 tablet by mouth daily   Refills:  0        VIACTIV PO   Dose:  1 tablet        Take 1 tablet by mouth daily.   Refills:  0                Prescriptions were sent or printed at these locations (1 Prescription)                   [id number] - [place], [place] - [address] [facility] [address]   [address], [facility] [address]    Telephone:  [phone]   Fax:  [phone]   Hours:                  E-Prescribed (1 of 1)         amoxicillin-clavulanate (AUGMENTIN) 875-125 MG per tablet                Orders Needing Specimen Collection     None      Pending Results     No orders found from 10/9/2018 to 10/12/2018.            Pending Culture Results     No orders found from 10/9/2018 to 10/12/2018.            Thank you for choosing [facility]       Thank you for choosing [facility] for your care. Our goal is always to provide you with excellent care. Hearing back from our patients is one way we can continue to improve our services. Please take a few minutes to  complete the written survey that you may receive in the mail after you visit with us. Thank you!        Care EveryWhere ID     This is your Care EveryWhere ID. This could be used by other organizations to access your [facility] medical records  [id number]        Equal Access to Services     GELY HERNANDEZ : Shefali Stark, darek robles, qarocio kaalkadeem bone, sachin buenrostro. So [facility] [phone].    ATENCIÓN: Si habla español, tiene a jean disposición servicios gratuitos de asistencia lingüística. Llame al [phone].    We comply with applicable federal civil rights laws and [place] laws. We do not discriminate on the basis of race, color, national origin, age, disability, sex, sexual orientation, or gender identity.            After Visit Summary       This is your record. Keep this with you and show to your community pharmacist(s) and doctor(s) at your next visit.

## 2018-10-11 NOTE — ED AVS SNAPSHOT
[facility]    [address] [place] [address]    Phone:  [phone]                                       Kamila Ma   MRN: [id number]    Department:  [facility]   Date of Visit:  10/11/2018           After Visit Summary Signature Page     I have received my discharge instructions, and my questions have been answered. I have discussed any challenges I see with this plan with the nurse or doctor.    ..........................................................................................................................................  Patient/Patient Representative Signature      ..........................................................................................................................................  Patient Representative Print Name and Relationship to Patient    ..................................................               ................................................  Date                                   Time    ..........................................................................................................................................  Reviewed by Signature/Title    ...................................................              ..............................................  Date                                               Time          22EPIC Rev 08/18

## 2018-10-11 NOTE — ED PROVIDER NOTES
History     Chief Complaint   Patient presents with     URI     URI for over two weeks.     The history is provided by the patient. No  was used.     Kamila Ma is a 23 year old female who has 2 weeks of cough/congestion, wheezing, decreased energy and sinus pain/pressure. No n/v/d. Has fever today. Left ear itches. 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  Kamila Ma has no plan or code status on file. Discussed available resources and provided with information. Confirmed code status reflects current choices pending further ACP discussions.  Confirmed/documented legally designated decision makers.  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     Diarrhea 01/01/2011     Hearing loss 01/01/2011     Mixed hearing loss 12/4/2015     Multiple allergies 01/01/2011     Other iron deficiency anemia 7/18/2016     Otomycosis of right ear 09/10/2012     Vasovagal near syncope 7/18/2016       Past Surgical History:    Past Surgical History:   Procedure Laterality Date     ADENOIDECTOMY        GI SURGERY      VSG      MOUTH SURGERY  8/2011    wisdom teeth extracted     PET placement       sagital suture release  6/1994     SEPTOPLASTY, TURBINOPLASTY, COMBINED N/A 6/13/2017    Procedure: COMBINED SEPTOPLASTY, TURBINOPLASTY;  SEPTOPLASTY, TURBINATE REDUCTION;  Surgeon: Gina Casillas MD;  Location: [facility]     TONSILLECTOMY         Family History:    Family History   Problem Relation Age of Onset     Hypertension Mother      Migraines Mother      Diabetes Paternal Grandfather      Hypertension Maternal Uncle      Hypertension Maternal Grandfather      Hypertension Maternal Grandmother      Depression Sister      Unknown/Adopted Paternal Grandmother        Social History:  Marital Status:  Single [1]  Social History   Substance Use Topics     Smoking status: Never Smoker     Smokeless tobacco: Never Used     Alcohol use No        Medications:      amoxicillin-clavulanate (AUGMENTIN) 875-125 MG per tablet   Calcium-Vitamin D-Vitamin K (VIACTIV PO)   ferrous sulfate (IRON) 325 (65 FE) MG tablet   levonorgest-eth estrad 91-day (LOSEASONIQUE) 0.1-0.02 & 0.01 MG per tablet   Pediatric Multivit-Minerals-C (FLINTSTONES COMPLETE PO)         Review of Systems   Constitutional: Positive for fatigue. Negative for appetite change and fever.   HENT: Positive for congestion, sinus pain and sinus pressure. Negative for ear pain, sore throat, trouble swallowing and voice change.    Eyes: Negative for discharge and redness.   Respiratory: Positive for cough and wheezing.    Cardiovascular: Negative.    Gastrointestinal: Negative for abdominal pain, diarrhea, nausea and vomiting.   Genitourinary: Negative.    Musculoskeletal: Negative for neck pain and neck stiffness.   Skin: Negative for rash.   Neurological: Negative for dizziness, light-headedness and headaches.   Psychiatric/Behavioral: Negative.        Physical Exam   BP: 128/93  Pulse: 56  Temp: 99.4  F (37.4  C)  Resp: 16  SpO2: 100 %      Physical Exam    Constitutional: She is oriented to person, place, and time. She appears well-developed and well-nourished. No distress.   HENT:   Head: Normocephalic and atraumatic.   Right Ear: External ear normal.   Left Ear: External ear normal.   Mouth/Throat: Oropharynx is clear and moist.   Bilateral TMs/canals clear/wnl  maxillary sinus TTP     Eyes: Conjunctivae and EOM are normal. Right eye exhibits no discharge. Left eye exhibits no discharge.   Neck: Normal range of motion. Neck supple.   Cardiovascular: Normal rate, regular rhythm and normal heart sounds.    Pulmonary/Chest: Effort normal. No respiratory distress.   All BS distant   Abdominal: Soft. Bowel sounds are normal. She exhibits no distension. There is no tenderness.   Neurological: She is alert and oriented to person, place, and time.   Skin: Skin is warm and dry. No rash noted. She is not diaphoretic.   Psychiatric: She has a normal mood and affect.   Nursing note and vitals reviewed.      ED Course     ED Course     Procedures      Assessments & Plan (with Medical Decision Making)     I have reviewed the nursing notes.    I have reviewed the findings, diagnosis, plan and need for follow up with the patient.      Discharge Medication List as of 10/11/2018 12:58 PM      START taking these medications    Details   amoxicillin-clavulanate (AUGMENTIN) 875-125 MG per tablet Take 1 tablet by mouth 2 times daily, Disp-20 tablet, R-0, E-Prescribe             Final diagnoses:   Acute bronchitis due to other specified organisms   Other acute sinusitis, recurrence not specified           Patient verbally educated and given appropriate education sheets for each of the diagnoses and has no questions.  Take OTC motrin or tylenol as directed on the bottle as needed.  Take prescription medications as directed.  Increase fluids, wash hands often.  Sleep in a recliner or with multiple pillows until this has resolved.  Follow up with your provider if symptoms increase or if  further concerns develop, return to the ER.  Yuni Dowd Certified   Physician Assistant  10/11/2018  1:21 PM  UR[facility]ENT CARE CLINIC    10/11/2018   [facility]     Yuni Dowd PA  10/11/18 8530

## 2018-10-11 NOTE — TELEPHONE ENCOUNTER
No more openings with Dr. Kim today - Please see if any other providers have openings. If not please advise to go into ED/UC.     Thank you!    Zoraida Stewart LPN

## 2018-12-21 ENCOUNTER — HOSPITAL ENCOUNTER (EMERGENCY)
Facility: HOSPITAL | Age: 23
Discharge: LEFT BEFORE DISCHARGE | End: 2018-12-21
Attending: FAMILY MEDICINE | Admitting: FAMILY MEDICINE
Payer: COMMERCIAL

## 2018-12-21 VITALS
BODY MASS INDEX: 37.2 KG/M2 | DIASTOLIC BLOOD PRESSURE: 70 MMHG | TEMPERATURE: 100.9 F | RESPIRATION RATE: 16 BRPM | WEIGHT: 210 LBS | OXYGEN SATURATION: 99 % | SYSTOLIC BLOOD PRESSURE: 126 MMHG

## 2018-12-21 PROCEDURE — 40000268 ZZH STATISTIC NO CHARGES

## 2018-12-21 RX ORDER — ONDANSETRON 2 MG/ML
4 INJECTION INTRAMUSCULAR; INTRAVENOUS EVERY 30 MIN PRN
Status: DISCONTINUED | OUTPATIENT
Start: 2018-12-21 | End: 2018-12-21 | Stop reason: HOSPADM

## 2018-12-21 RX ORDER — SODIUM CHLORIDE 9 MG/ML
1000 INJECTION, SOLUTION INTRAVENOUS CONTINUOUS
Status: DISCONTINUED | OUTPATIENT
Start: 2018-12-21 | End: 2018-12-21 | Stop reason: HOSPADM

## 2018-12-21 NOTE — ED TRIAGE NOTES
"Pt states she has had a cough for 2 weeks.  has vomited twice \"I think from coughing so much\".  has had gastric surgery in past \"so I am not suppose to be able to vomit\".  Pt stated she is able to drink and keep water down.  has had loose stools starting today \"uncontrolable\". Pt stated stabbing abdominal pain started today.  "

## 2019-01-10 ENCOUNTER — HOSPITAL ENCOUNTER (EMERGENCY)
Facility: HOSPITAL | Age: 24
Discharge: HOME | End: 2019-01-10
Attending: PHYSICIAN ASSISTANT | Admitting: PHYSICIAN ASSISTANT
Payer: COMMERCIAL

## 2019-01-10 VITALS
HEART RATE: 116 BPM | RESPIRATION RATE: 18 BRPM | TEMPERATURE: 100.6 F | SYSTOLIC BLOOD PRESSURE: 117 MMHG | DIASTOLIC BLOOD PRESSURE: 70 MMHG | WEIGHT: 210 LBS | OXYGEN SATURATION: 99 % | HEIGHT: 63 IN | BODY MASS INDEX: 37.21 KG/M2

## 2019-01-10 DIAGNOSIS — J06.9 UPPER RESPIRATORY INFECTION: ICD-10-CM

## 2019-01-10 DIAGNOSIS — H66.002 ACUTE SUPPURATIVE OTITIS MEDIA OF LEFT EAR WITHOUT SPONTANEOUS RUPTURE OF TYMPANIC MEMBRANE: ICD-10-CM

## 2019-01-10 PROCEDURE — 99213 OFFICE O/P EST LOW 20 MIN: CPT | Performed by: PHYSICIAN ASSISTANT

## 2019-01-10 PROCEDURE — G0463 HOSPITAL OUTPT CLINIC VISIT: HCPCS

## 2019-01-10 ASSESSMENT — ENCOUNTER SYMPTOMS
HEADACHES: 0
VOMITING: 0
EYE DISCHARGE: 0
CARDIOVASCULAR NEGATIVE: 1
NECK PAIN: 0
APPETITE CHANGE: 0
SORE THROAT: 0
DIZZINESS: 0
EYE REDNESS: 0
SINUS PRESSURE: 0
FATIGUE: 1
ABDOMINAL PAIN: 0
LIGHT-HEADEDNESS: 0
DIARRHEA: 0
TROUBLE SWALLOWING: 0
FEVER: 1
NECK STIFFNESS: 0
COUGH: 1
PSYCHIATRIC NEGATIVE: 1
NAUSEA: 0
VOICE CHANGE: 0

## 2019-01-10 ASSESSMENT — MIFFLIN-ST. JEOR: SCORE: 1671.68

## 2019-01-10 NOTE — ED PROVIDER NOTES
History     Chief Complaint   Patient presents with     URI     URI sx/congestion, cough for a couple days.     The history is provided by the patient. No  was used.     Kamila Ma is a 23 year old female who has cough/congestion x 2 days. No n/v/d/f/c. No rash. No change in b/b habits. 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  Kamila Ma has no plan or code status on file. Discussed available resources and provided with information. Confirmed code status reflects current choices pending further ACP discussions.  Confirmed/documented legally designated decision makers.  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     Diarrhea 01/01/2011     Hearing loss 01/01/2011     Mixed hearing loss 12/4/2015     Multiple allergies 01/01/2011     Other iron deficiency anemia 7/18/2016     Otomycosis of right ear 09/10/2012     Vasovagal near syncope 7/18/2016       Past Surgical History:    Past Surgical History:   Procedure Laterality Date     ADENOIDECTOMY       GI SURGERY      VSG       "MOUTH SURGERY  8/2011    wisdom teeth extracted     PET placement       sagital suture release  6/1994     SEPTOPLASTY, TURBINOPLASTY, COMBINED N/A 6/13/2017    Procedure: COMBINED SEPTOPLASTY, TURBINOPLASTY;  SEPTOPLASTY, TURBINATE REDUCTION;  Surgeon: Gina Casillas MD;  Location: [facility]     TONSILLECTOMY         Family History:    Family History   Problem Relation Age of Onset     Hypertension Mother      Migraines Mother      Diabetes Paternal Grandfather      Hypertension Maternal Uncle      Hypertension Maternal Grandfather      Hypertension Maternal Grandmother      Depression Sister      Unknown/Adopted Paternal Grandmother        Social History:  Marital Status:  Single [1]  Social History     Tobacco Use     Smoking status: Never Smoker     Smokeless tobacco: Never Used   Substance Use Topics     Alcohol use: No     Drug use: No        Medications:      amoxicillin-clavulanate (AUGMENTIN) 875-125 MG tablet   Calcium-Vitamin D-Vitamin K (VIACTIV PO)   ferrous sulfate (IRON) 325 (65 FE) MG tablet   levonorgest-eth estrad 91-day (LOSEASONIQUE) 0.1-0.02 & 0.01 MG per tablet   Pediatric Multivit-Minerals-C (FLINTSTONES COMPLETE PO)         Review of Systems   Constitutional: Positive for fatigue and fever. Negative for appetite change.   HENT: Positive for congestion. Negative for sinus pressure, sore throat, trouble swallowing and voice change.    Eyes: Negative for discharge and redness.   Respiratory: Positive for cough.    Cardiovascular: Negative.    Gastrointestinal: Negative for abdominal pain, diarrhea, nausea and vomiting.   Genitourinary: Negative.    Musculoskeletal: Negative for neck pain and neck stiffness.   Skin: Negative for rash.   Neurological: Negative for dizziness, light-headedness and headaches.   Psychiatric/Behavioral: Negative.        Physical Exam   BP: 117/70  Pulse: 116  Temp: 100.6  F (38.1  C)  Resp: 18  Height: 160 cm (5' 3\")  Weight: 95.3 kg (210 lb)(stated)  SpO2: 99 " %      Physical Exam   Constitutional: She is oriented to person, place, and time. She appears well-developed and well-nourished. No distress.   HENT:   Head: Normocephalic and atraumatic.   Right Ear: External ear normal.   Left Ear: External ear normal.   Mouth/Throat: Oropharynx is clear and moist.   Bilateral /canals clear/wnl  No sinus TTP    Left TM with erythema/bulging     Eyes: Conjunctivae and EOM are normal. Right eye exhibits no discharge. Left eye exhibits no discharge.   Neck: Normal range of motion. Neck supple.   Cardiovascular: Regular rhythm and normal heart sounds.   tachycardia   Pulmonary/Chest: Effort normal and breath sounds normal. No respiratory distress.   Abdominal: Soft. Bowel sounds are normal. She exhibits no distension. There is no tenderness.   Neurological: She is alert and oriented to person, place, and time.   Skin: Skin is warm and dry. No rash noted. She is not diaphoretic.   Psychiatric: She has a normal mood and affect.   Nursing note and vitals reviewed.      ED Course        Procedures                 No results found for this or any previous visit (from the past 24 hour(s)).    Medications - No data to display    Assessments & Plan (with Medical Decision Making)     I have reviewed the nursing notes.    I have reviewed the findings, diagnosis, plan and need for follow up with the patient.         Medication List      Started    amoxicillin-clavulanate 875-125 MG tablet  Commonly known as:  AUGMENTIN  1 tablet, Oral, 2 TIMES DAILY            Final diagnoses:   Acute suppurative otitis media of left ear without spontaneous rupture of tympanic membrane   Upper respiratory infection           Patient verbally educated and given appropriate education sheets for each of the diagnoses and has no questions.  Take OTC motrin or tylenol as directed on the bottle as needed.  Take prescription medications as directed.  Increase fluids, wash hands often.  Sleep in a recliner or with  multiple pillows until this has resolved.  Follow up with your provider if symptoms increase or if further concerns develop, return to the ER.  Yuin Dowd Certified   Physician Assistant  1/10/2019  9:05 PM  [facility]    1/10/2019   [facility]     Yuni Dowd PA  01/10/19 0798

## 2019-01-10 NOTE — ED AVS SNAPSHOT
[facility]  [address] [place] [address]  Phone:  [phone]                                    Kamila Ma   MRN: [id number]    Department:  [facility]   Date of Visit:  1/10/2019           After Visit Summary Signature Page    I have received my discharge instructions, and my questions have been answered. I have discussed any challenges I see with this plan with the nurse or doctor.    ..........................................................................................................................................  Patient/Patient Representative Signature      ..........................................................................................................................................  Patient Representative Print Name and Relationship to Patient    ..................................................               ................................................  Date                                   Time    ..........................................................................................................................................  Reviewed by Signature/Title    ...................................................              ..............................................  Date                                               Time          22EPIC Rev 08/18

## 2019-01-13 ENCOUNTER — APPOINTMENT (OUTPATIENT)
Dept: GENERAL RADIOLOGY | Facility: HOSPITAL | Age: 24
End: 2019-01-13
Payer: COMMERCIAL

## 2019-01-13 ENCOUNTER — HOSPITAL ENCOUNTER (EMERGENCY)
Facility: HOSPITAL | Age: 24
Discharge: HOME | End: 2019-01-13
Attending: INTERNAL MEDICINE | Admitting: INTERNAL MEDICINE
Payer: COMMERCIAL

## 2019-01-13 VITALS
OXYGEN SATURATION: 98 % | RESPIRATION RATE: 16 BRPM | HEART RATE: 81 BPM | SYSTOLIC BLOOD PRESSURE: 107 MMHG | TEMPERATURE: 97.6 F | DIASTOLIC BLOOD PRESSURE: 72 MMHG

## 2019-01-13 DIAGNOSIS — A08.4 VIRAL GASTROENTERITIS: ICD-10-CM

## 2019-01-13 DIAGNOSIS — N39.0 URINARY TRACT INFECTION WITHOUT HEMATURIA, SITE UNSPECIFIED: ICD-10-CM

## 2019-01-13 DIAGNOSIS — D50.0 IRON DEFICIENCY ANEMIA DUE TO CHRONIC BLOOD LOSS: ICD-10-CM

## 2019-01-13 LAB
ALBUMIN SERPL-MCNC: 3.5 G/DL (ref 3.4–5)
ALBUMIN UR-MCNC: 30 MG/DL
ALP SERPL-CCNC: 67 U/L (ref 40–150)
ALT SERPL W P-5'-P-CCNC: 28 U/L (ref 0–50)
ANION GAP SERPL CALCULATED.3IONS-SCNC: 10 MMOL/L (ref 3–14)
APPEARANCE UR: ABNORMAL
AST SERPL W P-5'-P-CCNC: 18 U/L (ref 0–45)
BACTERIA #/AREA URNS HPF: ABNORMAL /HPF
BASOPHILS # BLD AUTO: 0 10E9/L (ref 0–0.2)
BASOPHILS NFR BLD AUTO: 0.5 %
BILIRUB SERPL-MCNC: 0.2 MG/DL (ref 0.2–1.3)
BILIRUB UR QL STRIP: NEGATIVE
BUN SERPL-MCNC: 13 MG/DL (ref 7–30)
CALCIUM SERPL-MCNC: 8.4 MG/DL (ref 8.5–10.1)
CHLORIDE SERPL-SCNC: 103 MMOL/L (ref 94–109)
CO2 SERPL-SCNC: 24 MMOL/L (ref 20–32)
COLOR UR AUTO: YELLOW
CREAT SERPL-MCNC: 0.68 MG/DL (ref 0.52–1.04)
CRP SERPL-MCNC: 30.5 MG/L (ref 0–8)
DIFFERENTIAL METHOD BLD: ABNORMAL
EOSINOPHIL # BLD AUTO: 0.1 10E9/L (ref 0–0.7)
EOSINOPHIL NFR BLD AUTO: 1.2 %
ERYTHROCYTE [DISTWIDTH] IN BLOOD BY AUTOMATED COUNT: 16.6 % (ref 10–15)
GFR SERPL CREATININE-BSD FRML MDRD: >90 ML/MIN/{1.73_M2}
GLUCOSE SERPL-MCNC: 90 MG/DL (ref 70–99)
GLUCOSE UR STRIP-MCNC: NEGATIVE MG/DL
HCG UR QL: NEGATIVE
HCT VFR BLD AUTO: 36.9 % (ref 35–47)
HGB BLD-MCNC: 11.6 G/DL (ref 11.7–15.7)
HGB UR QL STRIP: NEGATIVE
HYALINE CASTS #/AREA URNS LPF: 1 /LPF
IMM GRANULOCYTES # BLD: 0 10E9/L (ref 0–0.4)
IMM GRANULOCYTES NFR BLD: 0.3 %
KETONES UR STRIP-MCNC: NEGATIVE MG/DL
LACTATE BLD-SCNC: 1 MMOL/L (ref 0.7–2)
LEUKOCYTE ESTERASE UR QL STRIP: ABNORMAL
LIPASE SERPL-CCNC: 95 U/L (ref 73–393)
LYMPHOCYTES # BLD AUTO: 3.1 10E9/L (ref 0.8–5.3)
LYMPHOCYTES NFR BLD AUTO: 47 %
MCH RBC QN AUTO: 22.3 PG (ref 26.5–33)
MCHC RBC AUTO-ENTMCNC: 31.4 G/DL (ref 31.5–36.5)
MCV RBC AUTO: 71 FL (ref 78–100)
MONOCYTES # BLD AUTO: 0.7 10E9/L (ref 0–1.3)
MONOCYTES NFR BLD AUTO: 11 %
MUCOUS THREADS #/AREA URNS LPF: PRESENT /LPF
NEUTROPHILS # BLD AUTO: 2.6 10E9/L (ref 1.6–8.3)
NEUTROPHILS NFR BLD AUTO: 40 %
NITRATE UR QL: NEGATIVE
NRBC # BLD AUTO: 0 10*3/UL
NRBC BLD AUTO-RTO: 0 /100
PH UR STRIP: 6 PH (ref 4.7–8)
PLATELET # BLD AUTO: 391 10E9/L (ref 150–450)
POTASSIUM SERPL-SCNC: 3.3 MMOL/L (ref 3.4–5.3)
PROT SERPL-MCNC: 7.5 G/DL (ref 6.8–8.8)
RBC # BLD AUTO: 5.21 10E12/L (ref 3.8–5.2)
RBC #/AREA URNS AUTO: 0 /HPF (ref 0–2)
SODIUM SERPL-SCNC: 137 MMOL/L (ref 133–144)
SOURCE: ABNORMAL
SP GR UR STRIP: 1.04 (ref 1–1.03)
SQUAMOUS #/AREA URNS AUTO: 12 /HPF (ref 0–1)
UROBILINOGEN UR STRIP-MCNC: 2 MG/DL (ref 0–2)
WBC # BLD AUTO: 6.6 10E9/L (ref 4–11)
WBC #/AREA URNS AUTO: 6 /HPF (ref 0–5)

## 2019-01-13 PROCEDURE — 25000125 ZZHC RX 250: Performed by: INTERNAL MEDICINE

## 2019-01-13 PROCEDURE — 96374 THER/PROPH/DIAG INJ IV PUSH: CPT

## 2019-01-13 PROCEDURE — 25000128 H RX IP 250 OP 636: Performed by: INTERNAL MEDICINE

## 2019-01-13 PROCEDURE — 25000132 ZZH RX MED GY IP 250 OP 250 PS 637: Performed by: INTERNAL MEDICINE

## 2019-01-13 PROCEDURE — 80053 COMPREHEN METABOLIC PANEL: CPT | Performed by: INTERNAL MEDICINE

## 2019-01-13 PROCEDURE — 86140 C-REACTIVE PROTEIN: CPT | Performed by: INTERNAL MEDICINE

## 2019-01-13 PROCEDURE — 94664 DEMO&/EVAL PT USE INHALER: CPT

## 2019-01-13 PROCEDURE — 96375 TX/PRO/DX INJ NEW DRUG ADDON: CPT

## 2019-01-13 PROCEDURE — 85025 COMPLETE CBC W/AUTO DIFF WBC: CPT | Performed by: INTERNAL MEDICINE

## 2019-01-13 PROCEDURE — 83605 ASSAY OF LACTIC ACID: CPT | Performed by: INTERNAL MEDICINE

## 2019-01-13 PROCEDURE — 71046 X-RAY EXAM CHEST 2 VIEWS: CPT | Mod: TC

## 2019-01-13 PROCEDURE — 81001 URINALYSIS AUTO W/SCOPE: CPT | Performed by: INTERNAL MEDICINE

## 2019-01-13 PROCEDURE — 99284 EMERGENCY DEPT VISIT MOD MDM: CPT | Mod: Z6 | Performed by: INTERNAL MEDICINE

## 2019-01-13 PROCEDURE — 83690 ASSAY OF LIPASE: CPT | Performed by: INTERNAL MEDICINE

## 2019-01-13 PROCEDURE — 99284 EMERGENCY DEPT VISIT MOD MDM: CPT | Mod: 25

## 2019-01-13 PROCEDURE — 81025 URINE PREGNANCY TEST: CPT | Performed by: INTERNAL MEDICINE

## 2019-01-13 PROCEDURE — 36415 COLL VENOUS BLD VENIPUNCTURE: CPT | Performed by: INTERNAL MEDICINE

## 2019-01-13 RX ORDER — ALUMINA, MAGNESIA, AND SIMETHICONE 2400; 2400; 240 MG/30ML; MG/30ML; MG/30ML
30 SUSPENSION ORAL ONCE
Status: COMPLETED | OUTPATIENT
Start: 2019-01-13 | End: 2019-01-13

## 2019-01-13 RX ORDER — ALBUTEROL SULFATE 90 UG/1
2 AEROSOL, METERED RESPIRATORY (INHALATION) ONCE
Status: COMPLETED | OUTPATIENT
Start: 2019-01-13 | End: 2019-01-13

## 2019-01-13 RX ORDER — GUAIFENESIN AND CODEINE PHOSPHATE 100; 10 MG/5ML; MG/5ML
120 SOLUTION ORAL ONCE
Status: DISCONTINUED | OUTPATIENT
Start: 2019-01-13 | End: 2019-01-14 | Stop reason: HOSPADM

## 2019-01-13 RX ORDER — NITROFURANTOIN 25; 75 MG/1; MG/1
100 CAPSULE ORAL ONCE
Status: COMPLETED | OUTPATIENT
Start: 2019-01-13 | End: 2019-01-13

## 2019-01-13 RX ORDER — ONDANSETRON 2 MG/ML
4 INJECTION INTRAMUSCULAR; INTRAVENOUS ONCE
Status: COMPLETED | OUTPATIENT
Start: 2019-01-13 | End: 2019-01-13

## 2019-01-13 RX ORDER — NITROFURANTOIN 25; 75 MG/1; MG/1
100 CAPSULE ORAL 2 TIMES DAILY
Qty: 10 CAPSULE | Refills: 0 | Status: SHIPPED | OUTPATIENT
Start: 2019-01-13 | End: 2019-03-21

## 2019-01-13 RX ADMIN — ALBUTEROL SULFATE 2 PUFF: 90 AEROSOL, METERED RESPIRATORY (INHALATION) at 23:12

## 2019-01-13 RX ADMIN — ONDANSETRON 4 MG: 2 INJECTION INTRAMUSCULAR; INTRAVENOUS at 21:53

## 2019-01-13 RX ADMIN — NITROFURANTOIN (MONOHYDRATE/MACROCRYSTALS) 100 MG: 75; 25 CAPSULE ORAL at 22:57

## 2019-01-13 RX ADMIN — ALUMINUM HYDROXIDE, MAGNESIUM HYDROXIDE, AND DIMETHICONE 30 ML: 400; 400; 40 SUSPENSION ORAL at 21:36

## 2019-01-13 RX ADMIN — FAMOTIDINE 20 MG: 10 INJECTION, SOLUTION INTRAVENOUS at 21:54

## 2019-01-13 ASSESSMENT — ENCOUNTER SYMPTOMS
CHILLS: 0
VOMITING: 1
NAUSEA: 0
DIZZINESS: 0
DIARRHEA: 1
MYALGIAS: 1
ABDOMINAL DISTENTION: 0
BACK PAIN: 0
NECK PAIN: 0
COLOR CHANGE: 0
LIGHT-HEADEDNESS: 0
DIAPHORESIS: 0
FEVER: 1
COUGH: 1
HEMATURIA: 0
ARTHRALGIAS: 0
RECENT COUGH: 1
DYSURIA: 0
FLANK PAIN: 0
SHORTNESS OF BREATH: 0
WOUND: 0
VOICE CHANGE: 0
PALPITATIONS: 0
NECK STIFFNESS: 0
NUMBNESS: 0
ANAL BLEEDING: 0
BLOOD IN STOOL: 0
ABDOMINAL PAIN: 1
CONFUSION: 0
CHEST TIGHTNESS: 0
HEADACHES: 0

## 2019-01-13 NOTE — ED AVS SNAPSHOT
[facility]  [address] [place] [address]  Phone:  [phone]                                    Kamila Ma   MRN: [id number]    Department:  [facility]   Date of Visit:  1/13/2019           After Visit Summary Signature Page    I have received my discharge instructions, and my questions have been answered. I have discussed any challenges I see with this plan with the nurse or doctor.    ..........................................................................................................................................  Patient/Patient Representative Signature      ..........................................................................................................................................  Patient Representative Print Name and Relationship to Patient    ..................................................               ................................................  Date                                   Time    ..........................................................................................................................................  Reviewed by Signature/Title    ...................................................              ..............................................  Date                                               Time          22EPIC Rev 08/18

## 2019-01-14 NOTE — ED TRIAGE NOTES
"Pt presents today with c/o started on Augmentin on Thursday.. And now having \"uncontrollable diarrhea and vomiting\"  States she stopped the Augmentin on Friday and is still continuing to have symptoms.  "

## 2019-01-14 NOTE — ED TRIAGE NOTES
Put on Augmentin Thursday for bronchitis and otitis. Vomiting and diarrhea onset within a couple of hours afterwards. Stopped Augmentin Friday and she has continued symptoms.

## 2019-01-14 NOTE — ED PROVIDER NOTES
History     Chief Complaint   Patient presents with     Nausea, Vomiting, & Diarrhea     Since Augmentin started Thursday within a couple of hours.     The history is provided by the patient.   Diarrhea   Quality:  Watery  Severity:  Moderate  Onset quality:  Gradual  Duration:  2 days  Timing:  Constant  Progression:  Worsening  Associated symptoms: abdominal pain, cough, fever, myalgias, URI and vomiting    Associated symptoms: no arthralgias, no chills, no diaphoresis and no headaches      Kamila Ma is a 23 year old female who URI, bronchitis type symtpoms for 2 wk, augmentin started by PMD ,   after that diarrhea started and did not stop after stopping antibiotic    Problem List:    Patient Active Problem List    Diagnosis Date Noted     Morbid obesity (H) 09/18/2017     Priority: Medium     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  Kamila Ma has no plan or code status on file. Discussed available resources and provided with information. Confirmed code status reflects current choices pending further ACP discussions.  Confirmed/documented legally designated decision makers.  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     Diarrhea 01/01/2011      Hearing loss 01/01/2011     Mixed hearing loss 12/4/2015     Multiple allergies 01/01/2011     Other iron deficiency anemia 7/18/2016     Otomycosis of right ear 09/10/2012     Vasovagal near syncope 7/18/2016       Past Surgical History:    Past Surgical History:   Procedure Laterality Date     ADENOIDECTOMY       GI SURGERY      VSG      MOUTH SURGERY  8/2011    wisdom teeth extracted     PET placement       sagital suture release  6/1994     SEPTOPLASTY, TURBINOPLASTY, COMBINED N/A 6/13/2017    Procedure: COMBINED SEPTOPLASTY, TURBINOPLASTY;  SEPTOPLASTY, TURBINATE REDUCTION;  Surgeon: Gina Casillas MD;  Location: [facility]     TONSILLECTOMY         Family History:    Family History   Problem Relation Age of Onset     Hypertension Mother      Migraines Mother      Diabetes Paternal Grandfather      Hypertension Maternal Uncle      Hypertension Maternal Grandfather      Hypertension Maternal Grandmother      Depression Sister      Unknown/Adopted Paternal Grandmother        Social History:  Marital Status:  Single [1]  Social History     Tobacco Use     Smoking status: Never Smoker     Smokeless tobacco: Never Used   Substance Use Topics     Alcohol use: No     Drug use: No        Medications:      Calcium-Vitamin D-Vitamin K (VIACTIV PO)   ferrous sulfate (IRON) 325 (65 FE) MG tablet   levonorgest-eth estrad 91-day (LOSEASONIQUE) 0.1-0.02 & 0.01 MG per tablet   nitroFURantoin macrocrystal-monohydrate (MACROBID) 100 MG capsule   Pediatric Multivit-Minerals-C (FLINTSTONES COMPLETE PO)         Review of Systems   Constitutional: Positive for fever. Negative for chills and diaphoresis.   HENT: Negative for voice change.    Eyes: Negative for visual disturbance.   Respiratory: Positive for cough and wheezing. Negative for chest tightness and shortness of breath.    Cardiovascular: Negative for chest pain, palpitations and leg swelling.   Gastrointestinal: Positive for abdominal pain, diarrhea and vomiting.  Negative for abdominal distention, anal bleeding, blood in stool and nausea.   Genitourinary: Positive for frequency. Negative for decreased urine volume, dysuria, flank pain and hematuria.   Musculoskeletal: Positive for myalgias. Negative for arthralgias, back pain, gait problem, neck pain and neck stiffness.   Skin: Negative for color change, pallor, rash and wound.   Neurological: Negative for dizziness, syncope, light-headedness, numbness and headaches.   Psychiatric/Behavioral: Negative for confusion and suicidal ideas.       Physical Exam   BP: 135/93  Pulse: 92  Temp: 100.5  F (38.1  C)  Resp: 18  SpO2: 98 %      Physical Exam   Constitutional: She is oriented to person, place, and time. She appears well-developed and well-nourished.   HENT:   Head: Normocephalic and atraumatic.   Mouth/Throat: No oropharyngeal exudate.   Eyes: Conjunctivae are normal. Pupils are equal, round, and reactive to light.   Neck: Normal range of motion. Neck supple. No JVD present. No tracheal deviation present. No thyromegaly present.   Cardiovascular: Normal rate, regular rhythm, normal heart sounds and intact distal pulses. Exam reveals no gallop and no friction rub.   No murmur heard.  Pulmonary/Chest: Effort normal. No stridor. No respiratory distress. She has wheezes. She has no rales. She exhibits no tenderness.   Abdominal: Soft. Bowel sounds are normal. She exhibits no distension and no mass. There is no tenderness. There is no rebound and no guarding.   Musculoskeletal: Normal range of motion. She exhibits no edema or tenderness.   Lymphadenopathy:     She has no cervical adenopathy.   Neurological: She is alert and oriented to person, place, and time.   Skin: Skin is warm and dry. No rash noted. No erythema. No pallor.   Psychiatric: Her behavior is normal.   Nursing note and vitals reviewed.      ED Course        Procedures                   No results found for this or any previous visit (from the past 24  hour(s)).    Medications   ondansetron (ZOFRAN) injection 4 mg (4 mg Intravenous Given 1/13/19 2153)   famotidine (PEPCID) injection 20 mg (20 mg Intravenous Given 1/13/19 2154)   alum & mag hydroxide-simethicone (MYLANTA ES/MAALOX  ES) suspension 30 mL (30 mLs Oral Given 1/13/19 2136)   nitroFURantoin macrocrystal-monohydrate (MACROBID) capsule 100 mg (100 mg Oral Given 1/13/19 2257)   albuterol (PROAIR HFA/PROVENTIL HFA/VENTOLIN HFA) 108 (90 Base) MCG/ACT inhaler 2 puff (2 puffs Inhalation Given 1/13/19 2312)       Assessments & Plan (with Medical Decision Making)   Watery diarhrea for 3 days, URI symptoms  Abdominal pain, vomiting   urinary frequency  Symptoms resolved after IVF therapy, anti acid   She feels much better  Viral gastroenteritis + mild reactive airway  D C home, I advised her to return to ER if syptoms persisted  She understood and agreed.   I have reviewed the nursing notes.    I have reviewed the findings, diagnosis, plan and need for follow up with the patient.         Medication List      Started    nitroFURantoin macrocrystal-monohydrate 100 MG capsule  Commonly known as:  MACROBID  100 mg, Oral, 2 TIMES DAILY            Final diagnoses:   Urinary tract infection without hematuria, site unspecified   Iron deficiency anemia due to chronic blood loss   Viral gastroenteritis       1/13/2019   [facility]     Chidi Yoder MD  01/15/19 9388       Chidi Yoder MD  01/17/19 4172

## 2019-01-14 NOTE — ED NOTES
robotussin take home given and instructions on administration given. Albuterol inh and instructions given by RT.

## 2019-01-15 ASSESSMENT — ENCOUNTER SYMPTOMS
FREQUENCY: 1
WHEEZING: 1

## 2019-01-24 ENCOUNTER — TELEPHONE (OUTPATIENT)
Dept: FAMILY MEDICINE | Facility: OTHER | Age: 24
End: 2019-01-24

## 2019-01-24 NOTE — TELEPHONE ENCOUNTER
GENERIC ADULT  Kamila Ma is a 23 year old female who calls with cough.    Patient called stating she has had a chronic cough since beginning of December. Patient has been seen in Urgent Care/ Emergency Department multiple times for symptoms. Patient was given Augmentin and cough medicine in the Emergency Department. Patient also prescribed antibiotic for UTI. Patient states she coughs so hard she vomits.  Patient's cough is productive with larges amounts of clear mucus. Patient has been running fever of 101-102.  Patient states her breathing is more shallow and she is more short of breath with activity. Patient states shortness of breath recovers with rest.  Patient doesn't have a history of Asthma or COPD.  Patient is not experiencing any chest pain.      RECOMMENDED DISPOSITION:  Patient scheduled with PCP 01/25/19 at 9:30 am. Nurse advise patient that if she experiences worsening symptoms or experiences any chest pain she should be seen sooner in Urgent Care/ Emergency Department.  Patient understood and agreed with recommendation.    Will comply with recommendation:yes    If further questions/concerns or if symptoms do not improve, worsen or new symptoms develop, call your PCP or [facility] as soon as possible.      Guideline used: Adult Telephone Protocols Office Version 3rd Edition - Ulysses Rinaldi MD, ANNA Trujillo RN

## 2019-01-25 ENCOUNTER — OFFICE VISIT (OUTPATIENT)
Dept: FAMILY MEDICINE | Facility: OTHER | Age: 24
End: 2019-01-25
Attending: FAMILY MEDICINE
Payer: COMMERCIAL

## 2019-01-25 VITALS
HEART RATE: 90 BPM | WEIGHT: 233.8 LBS | DIASTOLIC BLOOD PRESSURE: 70 MMHG | BODY MASS INDEX: 41.42 KG/M2 | SYSTOLIC BLOOD PRESSURE: 100 MMHG | RESPIRATION RATE: 20 BRPM | TEMPERATURE: 100.5 F | OXYGEN SATURATION: 99 %

## 2019-01-25 DIAGNOSIS — R05.3 CHRONIC COUGH: Primary | ICD-10-CM

## 2019-01-25 LAB
BASOPHILS # BLD AUTO: 0.1 10E9/L (ref 0–0.2)
BASOPHILS NFR BLD AUTO: 0.9 %
DIFFERENTIAL METHOD BLD: ABNORMAL
EOSINOPHIL # BLD AUTO: 0.1 10E9/L (ref 0–0.7)
EOSINOPHIL NFR BLD AUTO: 2.3 %
ERYTHROCYTE [DISTWIDTH] IN BLOOD BY AUTOMATED COUNT: 16.7 % (ref 10–15)
HCT VFR BLD AUTO: 36.9 % (ref 35–47)
HGB BLD-MCNC: 11.3 G/DL (ref 11.7–15.7)
IMM GRANULOCYTES # BLD: 0 10E9/L (ref 0–0.4)
IMM GRANULOCYTES NFR BLD: 0.2 %
LYMPHOCYTES # BLD AUTO: 1.6 10E9/L (ref 0.8–5.3)
LYMPHOCYTES NFR BLD AUTO: 27.6 %
MCH RBC QN AUTO: 22.1 PG (ref 26.5–33)
MCHC RBC AUTO-ENTMCNC: 30.6 G/DL (ref 31.5–36.5)
MCV RBC AUTO: 72 FL (ref 78–100)
MONOCYTES # BLD AUTO: 0.7 10E9/L (ref 0–1.3)
MONOCYTES NFR BLD AUTO: 12.9 %
NEUTROPHILS # BLD AUTO: 3.2 10E9/L (ref 1.6–8.3)
NEUTROPHILS NFR BLD AUTO: 56.1 %
NRBC # BLD AUTO: 0 10*3/UL
NRBC BLD AUTO-RTO: 0 /100
PLATELET # BLD AUTO: 397 10E9/L (ref 150–450)
RBC # BLD AUTO: 5.11 10E12/L (ref 3.8–5.2)
TSH SERPL DL<=0.005 MIU/L-ACNC: 1.27 MU/L (ref 0.4–4)
WBC # BLD AUTO: 5.7 10E9/L (ref 4–11)

## 2019-01-25 PROCEDURE — 85025 COMPLETE CBC W/AUTO DIFF WBC: CPT | Performed by: FAMILY MEDICINE

## 2019-01-25 PROCEDURE — 99213 OFFICE O/P EST LOW 20 MIN: CPT | Performed by: FAMILY MEDICINE

## 2019-01-25 PROCEDURE — 84443 ASSAY THYROID STIM HORMONE: CPT | Performed by: FAMILY MEDICINE

## 2019-01-25 PROCEDURE — 36415 COLL VENOUS BLD VENIPUNCTURE: CPT | Performed by: FAMILY MEDICINE

## 2019-01-25 ASSESSMENT — PAIN SCALES - GENERAL: PAINLEVEL: NO PAIN (0)

## 2019-01-25 NOTE — NURSING NOTE
"Chief Complaint   Patient presents with     Cough       Initial /70 (BP Location: Left arm, Patient Position: Chair, Cuff Size: Adult Large)   Pulse 90   Temp 100.5  F (38.1  C) (Tympanic)   Resp 20   Wt 106.1 kg (233 lb 12.8 oz)   SpO2 99%   BMI 41.42 kg/m   Estimated body mass index is 41.42 kg/m  as calculated from the following:    Height as of 1/10/19: 1.6 m (5' 3\").    Weight as of this encounter: 106.1 kg (233 lb 12.8 oz).  Medication Reconciliation: complete    Shannon Briseno LPN    "

## 2019-01-25 NOTE — PROGRESS NOTES
SUBJECTIVE:   Kamila Ma is a 23 year old female who presents to clinic today for the following health issues:      RESPIRATORY SYMPTOMS      Duration: over 1 month    Description  nasal congestion, rhinorrhea, cough, wheezing, fever, chills, headache, hoarse voice, nausea and diarrhea    Severity: moderate    Accompanying signs and symptoms: None    History (predisposing factors):  Works at , so exposed to many things    Precipitating or alleviating factors: None    Therapies tried and outcome:  rest and fluids oral decongestant guaifenesin tylenol and ibuprofen occasionally, augmentin, macrobid    Was seen yesterday at [facility] and given ppi for gerd    Problem list and histories reviewed & adjusted, as indicated.  Additional history: as documented    Patient Active Problem List   Diagnosis     Mixed hearing loss     Acute recurrent maxillary sinusitis     ACP (advance care planning)     Other iron deficiency anemia     Bariatric surgery status     Vasovagal near syncope     Crouzon craniofacial dysostosis     Morbid obesity (H)     Past Surgical History:   Procedure Laterality Date     ADENOIDECTOMY       GI SURGERY      VSG      MOUTH SURGERY  8/2011    wisdom teeth extracted     PET placement       sagital suture release  6/1994     SEPTOPLASTY, TURBINOPLASTY, COMBINED N/A 6/13/2017    Procedure: COMBINED SEPTOPLASTY, TURBINOPLASTY;  SEPTOPLASTY, TURBINATE REDUCTION;  Surgeon: Gina Casillas MD;  Location: [facility]     TONSILLECTOMY         Social History     Tobacco Use     Smoking status: Never Smoker     Smokeless tobacco: Never Used   Substance Use Topics     Alcohol use: No     Family History   Problem Relation Age of Onset     Hypertension Mother      Migraines Mother      Diabetes Paternal Grandfather      Hypertension Maternal Uncle      Hypertension Maternal Grandfather      Hypertension Maternal Grandmother      Depression Sister      Unknown/Adopted Paternal Grandmother           Current Outpatient Medications   Medication Sig Dispense Refill     Calcium-Vitamin D-Vitamin K (VIACTIV PO) Take 1 tablet by mouth daily.        ferrous sulfate (IRON) 325 (65 FE) MG tablet Take 1 tablet (325 mg) by mouth 2 times daily 60 tablet 1     levonorgest-eth estrad 91-day (LOSEASONIQUE) 0.1-0.02 & 0.01 MG per tablet Take 1 tablet by mouth daily       Pediatric Multivit-Minerals-C (FLINTSTONES COMPLETE PO) Take 1 tablet by mouth daily.        Allergies   Allergen Reactions     Hydrocodone-Acetaminophen Nausea and Vomiting     Morphine Nausea and Vomiting     Sulfa Drugs Hives     SULFA (SULFONAMIDE ANTIBIOTICS)     Topamax Diarrhea     Topiramate Diarrhea     BP Readings from Last 3 Encounters:   01/25/19 100/70   01/13/19 107/72   01/10/19 117/70    Wt Readings from Last 3 Encounters:   01/25/19 106.1 kg (233 lb 12.8 oz)   01/10/19 95.3 kg (210 lb)   12/21/18 95.3 kg (210 lb)                  Labs reviewed in EPIC    Reviewed and updated as needed this visit by clinical staff  Tobacco  Allergies  Meds  Med Hx  Surg Hx  Fam Hx  Soc Hx      Reviewed and updated as needed this visit by Provider         ROS:  Constitutional, HEENT, cardiovascular, pulmonary, gi and gu systems are negative, except as otherwise noted.    OBJECTIVE:                                                    /70 (BP Location: Left arm, Patient Position: Chair, Cuff Size: Adult Large)   Pulse 90   Temp 100.5  F (38.1  C) (Tympanic)   Resp 20   Wt 106.1 kg (233 lb 12.8 oz)   SpO2 99%   BMI 41.42 kg/m     Body mass index is 41.42 kg/m .  GENERAL APPEARANCE: healthy, alert and no distress  HENT: ear canals and TM's normal, nose and mouth without ulcers or lesions, frontal sinus non-tender and maxillary sinus non-tender  NECK: no adenopathy, no asymmetry, masses, or scars and thyroid normal to palpation  RESP: lungs clear to auscultation - no rales, rhonchi or wheezes  CV: regular rates and rhythm, normal S1 S2, no S3  or S4 and no murmur, click or rub  PSYCH: mentation appears normal and affect normal/bright       ASSESSMENT/PLAN:                                                    1. Chronic cough  Will check labs to see if any bacterial or viral -- if not recommend treating for allergies and nasal saline rinse  If not better after 7-10 days of that  - CBC with platelets differential  - TSH with free T4 reflex    Patient was agreeable to this plan and had no further questions.  See Patient Instructions    Sol Kim MD  [facility] [place]

## 2019-03-21 ENCOUNTER — ANCILLARY PROCEDURE (OUTPATIENT)
Dept: GENERAL RADIOLOGY | Facility: OTHER | Age: 24
End: 2019-03-21
Attending: PHYSICIAN ASSISTANT
Payer: COMMERCIAL

## 2019-03-21 ENCOUNTER — OFFICE VISIT (OUTPATIENT)
Dept: FAMILY MEDICINE | Facility: OTHER | Age: 24
End: 2019-03-21
Attending: PHYSICIAN ASSISTANT
Payer: COMMERCIAL

## 2019-03-21 VITALS
HEIGHT: 63 IN | TEMPERATURE: 100.2 F | BODY MASS INDEX: 42.52 KG/M2 | HEART RATE: 103 BPM | WEIGHT: 240 LBS | OXYGEN SATURATION: 100 % | SYSTOLIC BLOOD PRESSURE: 110 MMHG | DIASTOLIC BLOOD PRESSURE: 58 MMHG

## 2019-03-21 DIAGNOSIS — R50.9 FEVER AND CHILLS: Primary | ICD-10-CM

## 2019-03-21 DIAGNOSIS — R50.9 FEVER AND CHILLS: ICD-10-CM

## 2019-03-21 LAB
FLUAV+FLUBV RNA SPEC QL NAA+PROBE: NEGATIVE
FLUAV+FLUBV RNA SPEC QL NAA+PROBE: NEGATIVE
RSV RNA SPEC NAA+PROBE: NEGATIVE
SPECIMEN SOURCE: NORMAL

## 2019-03-21 PROCEDURE — 87631 RESP VIRUS 3-5 TARGETS: CPT | Performed by: PHYSICIAN ASSISTANT

## 2019-03-21 PROCEDURE — 99213 OFFICE O/P EST LOW 20 MIN: CPT | Performed by: PHYSICIAN ASSISTANT

## 2019-03-21 PROCEDURE — 71046 X-RAY EXAM CHEST 2 VIEWS: CPT | Mod: TC

## 2019-03-21 RX ORDER — OSELTAMIVIR PHOSPHATE 75 MG/1
75 CAPSULE ORAL 2 TIMES DAILY
Qty: 10 CAPSULE | Refills: 0 | Status: SHIPPED | OUTPATIENT
Start: 2019-03-21 | End: 2019-07-31

## 2019-03-21 ASSESSMENT — MIFFLIN-ST. JEOR: SCORE: 1802.76

## 2019-03-21 ASSESSMENT — PAIN SCALES - GENERAL: PAINLEVEL: NO PAIN (0)

## 2019-03-21 NOTE — LETTER
[facility] [place]  [address]  [place] [address]  Phone: [phone]    March 21, 2019        Kamila Ma  [address]   [place] [address]          To whom it may concern:    RE: Kamila Ma    Patient was seen and treated today at our clinic.  She has Influenza like Illness and can't return to work until fever free for 24 hours. She has been treated.     Please contact me for questions or concerns.      Sincerely,        VIKRAM Francois

## 2019-03-21 NOTE — PATIENT INSTRUCTIONS
Thank you for choosing [facility].   I have office hours 8:00 am to 4:30 pm on Monday's, Wednesday's, Thursday's and Friday's. My nurse and I are out of the office every Tuesday.    Following your visit, when your labs and diagnostic testing have returned, I will review then and you will be contacted by my nurse.  If you are on My Chart, you can also view results there.    For refills, notify your pharmacy regarding what you need and the pharmacy will generate a refill request. Do not call my nurse as she is unable to process refill request. Please plan ahead and allow 3-5 days for refill requests.    You will generally receive a reminder call the day prior to your appointment.  If you cannot attend your appointment, please cancel your appointment with as much notice as possible.  If there is a pattern of failure to present for your appointments, I cannot provide consistent, meaningful, ongoing care for you. It is very important to me that you come in for your care, so we can best assist you with your health care needs.    IMPORTANT:  Please note that it is my standard of practice to NOT participate in prescribing ongoing requested Narcotic Analgesic therapy, and/or participate in the prescribing of other controlled substances.  My nurse and I am happy to assist you with the process of referral for alternative pain management as needed, and other treatment modalities including but not limited to:  Physical Therapy, Physical Medicine and Rehab, Counseling, Chiropractic Care, Orthopedic Care, and non-narcotic medication management.     In the event that you need to be seen for emergent concerns and I am out of office,  please see one of my colleagues for acute concerns.  You may also present to  or ER.  I appreciate the opportunity to serve you and look forward to supporting your healthcare needs in the future. Please contact me with any questions or concerns that you may  have.    Sincerely,      Faye Chang RN, PA-C

## 2019-03-21 NOTE — NURSING NOTE
"Chief Complaint   Patient presents with     Chronic Cough       Initial /58 (BP Location: Left arm, Patient Position: Sitting, Cuff Size: Adult Large)   Pulse 103   Temp 100.2  F (37.9  C) (Tympanic)   Ht 1.6 m (5' 3\")   Wt 108.9 kg (240 lb)   SpO2 100%   BMI 42.51 kg/m   Estimated body mass index is 42.51 kg/m  as calculated from the following:    Height as of this encounter: 1.6 m (5' 3\").    Weight as of this encounter: 108.9 kg (240 lb).  Medication Reconciliation: complete    Sarah Cano LPN  "

## 2019-03-21 NOTE — PROGRESS NOTES
SUBJECTIVE:   Kamila Ma is a 24 year old female who presents to clinic today for the following health issues:        Cough      Duration: Follow up    Description (location/character/radiation): Chronic cough    Intensity:  moderate    Accompanying signs and symptoms: Productive cough with yellow sputum every morning, low grade temp, congestion, SOB    History (similar episodes/previous evaluation): Has had symptoms since November 2018    Precipitating or alleviating factors: None    Therapies tried and outcome: Various ABX, symptoms still persist           Problem list and histories reviewed & adjusted, as indicated.  Additional history: as documented    Patient Active Problem List   Diagnosis     Mixed hearing loss     Acute recurrent maxillary sinusitis     ACP (advance care planning)     Other iron deficiency anemia     Bariatric surgery status     Vasovagal near syncope     Crouzon craniofacial dysostosis     Morbid obesity (H)     Past Surgical History:   Procedure Laterality Date     ADENOIDECTOMY       GI SURGERY      VSG      MOUTH SURGERY  8/2011    wisdom teeth extracted     PET placement       sagital suture release  6/1994     SEPTOPLASTY, TURBINOPLASTY, COMBINED N/A 6/13/2017    Procedure: COMBINED SEPTOPLASTY, TURBINOPLASTY;  SEPTOPLASTY, TURBINATE REDUCTION;  Surgeon: Gina Casillas MD;  Location: [facility]     TONSILLECTOMY         Social History     Tobacco Use     Smoking status: Never Smoker     Smokeless tobacco: Never Used   Substance Use Topics     Alcohol use: No     Family History   Problem Relation Age of Onset     Hypertension Mother      Migraines Mother      Diabetes Paternal Grandfather      Hypertension Maternal Uncle      Hypertension Maternal Grandfather      Hypertension Maternal Grandmother      Depression Sister      Unknown/Adopted Paternal Grandmother          Current Outpatient Medications   Medication Sig Dispense Refill     Calcium-Vitamin D-Vitamin K  "(VIACTIV PO) Take 1 tablet by mouth daily.        ferrous sulfate (IRON) 325 (65 FE) MG tablet Take 1 tablet (325 mg) by mouth 2 times daily 60 tablet 1     Pediatric Multivit-Minerals-C (FLINTSTONES COMPLETE PO) Take 1 tablet by mouth daily.        Allergies   Allergen Reactions     Hydrocodone-Acetaminophen Nausea and Vomiting     Morphine Nausea and Vomiting     Sulfa Drugs Hives     SULFA (SULFONAMIDE ANTIBIOTICS)     Topamax Diarrhea     Topiramate Diarrhea     Recent Labs   Lab Test 01/25/19  0955 01/13/19  2131 02/02/17  1136 10/10/16  1219 07/18/16  0829   ALT  --  28 19 18 15   CR  --  0.68 0.74 0.67 0.76   GFRESTIMATED  --  >90 >90  Non  GFR Calc   >90  Non  GFR Calc   >90  Non  GFR Calc     GFRESTBLACK  --  >90 >90   GFR Calc   >90   GFR Calc   >90   GFR Calc     POTASSIUM  --  3.3* 4.2 4.2 4.0   TSH 1.27  --   --   --  1.78      BP Readings from Last 3 Encounters:   03/21/19 110/58   01/25/19 100/70   01/13/19 107/72    Wt Readings from Last 3 Encounters:   03/21/19 108.9 kg (240 lb)   01/25/19 106.1 kg (233 lb 12.8 oz)   01/10/19 95.3 kg (210 lb)                    Reviewed and updated as needed this visit by clinical staff       Reviewed and updated as needed this visit by Provider         ROS:  Constitutional, neuro, ENT, endocrine, pulmonary, cardiac, gastrointestinal, genitourinary, musculoskeletal, integument and psychiatric systems are negative, except as otherwise noted.    OBJECTIVE:                                                    /58 (BP Location: Left arm, Patient Position: Sitting, Cuff Size: Adult Large)   Pulse 103   Temp 100.2  F (37.9  C) (Tympanic)   Ht 1.6 m (5' 3\")   Wt 108.9 kg (240 lb)   SpO2 100%   BMI 42.51 kg/m    Body mass index is 42.51 kg/m .  GENERAL APPEARANCE: healthy, alert and no distress  EYES: Eyes grossly normal to inspection, PERRL and conjunctivae and sclerae " normal  HENT: ear canals show mastoid bowl and hearing aide in the other.  Her nose and mouth without ulcers or lesions  NECK: no adenopathy, no asymmetry, masses, or scars and thyroid normal to palpation  RESP: lungs clear to auscultation - no rales, rhonchi or wheezes  CV: regular rates and rhythm, normal S1 S2, no S3 or S4 and no murmur, click or rub  LYMPHATICS: no cervical adenopathy  MS: extremities normal- no gross deformities noted  SKIN: no suspicious lesions or rashes  PSYCH: mentation appears normal and affect normal/bright    Diagnostic test results:  Diagnostic Test Results:  No results found for this or any previous visit (from the past 24 hour(s)).   .labs negative influenza.   ASSESSMENT/PLAN:                                                    1. 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 See us back as recommended. Her CXR was clear.   - Influenza A /B and RSV PCR performed in [place]  - XR CHEST 2 VW (Clinic Performed); Future  - oseltamivir (TAMIFLU) 75 MG capsule; Take 1 capsule (75 mg) by mouth 2 times daily for 5 days  Dispense: 10 capsule; Refill: 0        See Patient Instructions    VIKRAM Francois  [facility]

## 2019-07-31 ENCOUNTER — NURSE TRIAGE (OUTPATIENT)
Dept: FAMILY MEDICINE | Facility: OTHER | Age: 24
End: 2019-07-31

## 2019-07-31 ENCOUNTER — OFFICE VISIT (OUTPATIENT)
Dept: FAMILY MEDICINE | Facility: OTHER | Age: 24
End: 2019-07-31
Attending: FAMILY MEDICINE
Payer: COMMERCIAL

## 2019-07-31 ENCOUNTER — ANCILLARY PROCEDURE (OUTPATIENT)
Dept: GENERAL RADIOLOGY | Facility: OTHER | Age: 24
End: 2019-07-31
Attending: FAMILY MEDICINE
Payer: COMMERCIAL

## 2019-07-31 VITALS
TEMPERATURE: 98.4 F | SYSTOLIC BLOOD PRESSURE: 104 MMHG | WEIGHT: 254 LBS | HEIGHT: 63 IN | HEART RATE: 89 BPM | BODY MASS INDEX: 45 KG/M2 | OXYGEN SATURATION: 98 % | DIASTOLIC BLOOD PRESSURE: 58 MMHG

## 2019-07-31 DIAGNOSIS — R91.8 PULMONARY NODULES: Primary | ICD-10-CM

## 2019-07-31 DIAGNOSIS — R91.8 PULMONARY NODULES: ICD-10-CM

## 2019-07-31 DIAGNOSIS — J06.9 VIRAL URI WITH COUGH: ICD-10-CM

## 2019-07-31 LAB
BASOPHILS # BLD AUTO: 0.1 10E9/L (ref 0–0.2)
BASOPHILS NFR BLD AUTO: 0.7 %
DIFFERENTIAL METHOD BLD: ABNORMAL
EOSINOPHIL # BLD AUTO: 0.2 10E9/L (ref 0–0.7)
EOSINOPHIL NFR BLD AUTO: 1.4 %
ERYTHROCYTE [DISTWIDTH] IN BLOOD BY AUTOMATED COUNT: 16.6 % (ref 10–15)
HCT VFR BLD AUTO: 35.1 % (ref 35–47)
HGB BLD-MCNC: 10.4 G/DL (ref 11.7–15.7)
IMM GRANULOCYTES # BLD: 0 10E9/L (ref 0–0.4)
IMM GRANULOCYTES NFR BLD: 0.4 %
LYMPHOCYTES # BLD AUTO: 3.4 10E9/L (ref 0.8–5.3)
LYMPHOCYTES NFR BLD AUTO: 31.3 %
MCH RBC QN AUTO: 21.2 PG (ref 26.5–33)
MCHC RBC AUTO-ENTMCNC: 29.6 G/DL (ref 31.5–36.5)
MCV RBC AUTO: 72 FL (ref 78–100)
MONOCYTES # BLD AUTO: 0.8 10E9/L (ref 0–1.3)
MONOCYTES NFR BLD AUTO: 7.2 %
NEUTROPHILS # BLD AUTO: 6.4 10E9/L (ref 1.6–8.3)
NEUTROPHILS NFR BLD AUTO: 59 %
NRBC # BLD AUTO: 0 10*3/UL
NRBC BLD AUTO-RTO: 0 /100
PLATELET # BLD AUTO: 449 10E9/L (ref 150–450)
RBC # BLD AUTO: 4.91 10E12/L (ref 3.8–5.2)
WBC # BLD AUTO: 10.9 10E9/L (ref 4–11)

## 2019-07-31 PROCEDURE — 85025 COMPLETE CBC W/AUTO DIFF WBC: CPT | Performed by: FAMILY MEDICINE

## 2019-07-31 PROCEDURE — 36415 COLL VENOUS BLD VENIPUNCTURE: CPT | Performed by: FAMILY MEDICINE

## 2019-07-31 PROCEDURE — 71046 X-RAY EXAM CHEST 2 VIEWS: CPT | Mod: TC

## 2019-07-31 PROCEDURE — 99213 OFFICE O/P EST LOW 20 MIN: CPT | Performed by: FAMILY MEDICINE

## 2019-07-31 ASSESSMENT — MIFFLIN-ST. JEOR: SCORE: 1866.27

## 2019-07-31 ASSESSMENT — PAIN SCALES - GENERAL: PAINLEVEL: NO PAIN (0)

## 2019-07-31 NOTE — NURSING NOTE
"Chief Complaint   Patient presents with     URI       Initial /58 (BP Location: Right arm, Patient Position: Sitting, Cuff Size: Adult Large)   Pulse 89   Temp 98.4  F (36.9  C) (Tympanic)   Ht 1.6 m (5' 3\")   Wt 115.2 kg (254 lb)   SpO2 98%   BMI 44.99 kg/m   Estimated body mass index is 44.99 kg/m  as calculated from the following:    Height as of this encounter: 1.6 m (5' 3\").    Weight as of this encounter: 115.2 kg (254 lb).  Medication Reconciliation: complete  "

## 2019-07-31 NOTE — PROGRESS NOTES
Subjective     Kamila Ma is a 24 year old female who presents to clinic today for the following health issues:    HPI   RESPIRATORY SYMPTOMS      Duration: 2 weeks    Description  nasal congestion, rhinorrhea, cough, fever, ear pain both, fatigue/malaise and nausea    Severity: mild    Accompanying signs and symptoms: None    History (predisposing factors):  none    Precipitating or alleviating factors: None    Therapies tried and outcome:  none      Patient Active Problem List   Diagnosis     Mixed hearing loss     Acute recurrent maxillary sinusitis     ACP (advance care planning)     Other iron deficiency anemia     Bariatric surgery status     Vasovagal near syncope     Crouzon craniofacial dysostosis     Morbid obesity (H)     Pulmonary nodules     Past Surgical History:   Procedure Laterality Date     ADENOIDECTOMY       GI SURGERY      VSG      MOUTH SURGERY  8/2011    wisdom teeth extracted     PET placement       sagital suture release  6/1994     SEPTOPLASTY, TURBINOPLASTY, COMBINED N/A 6/13/2017    Procedure: COMBINED SEPTOPLASTY, TURBINOPLASTY;  SEPTOPLASTY, TURBINATE REDUCTION;  Surgeon: Gina Casillas MD;  Location: [facility]     TONSILLECTOMY         Social History     Tobacco Use     Smoking status: Never Smoker     Smokeless tobacco: Never Used   Substance Use Topics     Alcohol use: No     Family History   Problem Relation Age of Onset     Hypertension Mother      Migraines Mother      Diabetes Paternal Grandfather      Hypertension Maternal Uncle      Hypertension Maternal Grandfather      Hypertension Maternal Grandmother      Depression Sister      Unknown/Adopted Paternal Grandmother          Current Outpatient Medications   Medication Sig Dispense Refill     Calcium-Vitamin D-Vitamin K (VIACTIV PO) Take 1 tablet by mouth daily.        ferrous sulfate (IRON) 325 (65 FE) MG tablet Take 1 tablet (325 mg) by mouth 2 times daily 60 tablet 1     Pediatric Multivit-Minerals-C  "(FLINTSTONES COMPLETE PO) Take 1 tablet by mouth daily.        Allergies   Allergen Reactions     Hydrocodone-Acetaminophen Nausea and Vomiting     Morphine Nausea and Vomiting     Sulfa Drugs Hives     SULFA (SULFONAMIDE ANTIBIOTICS)     Topamax Diarrhea     Topiramate Diarrhea     BP Readings from Last 3 Encounters:   07/31/19 104/58   03/21/19 110/58   01/25/19 100/70    Wt Readings from Last 3 Encounters:   07/31/19 115.2 kg (254 lb)   03/21/19 108.9 kg (240 lb)   01/25/19 106.1 kg (233 lb 12.8 oz)      Reviewed and updated as needed this visit by Provider         Review of Systems   ROS COMP: Constitutional, HEENT, cardiovascular, pulmonary, gi and gu systems are negative, except as otherwise noted.      Objective    /58 (BP Location: Right arm, Patient Position: Sitting, Cuff Size: Adult Large)   Pulse 89   Temp 98.4  F (36.9  C) (Tympanic)   Ht 1.6 m (5' 3\")   Wt 115.2 kg (254 lb)   SpO2 98%   BMI 44.99 kg/m    There is no height or weight on file to calculate BMI.  Physical Exam   GENERAL: healthy, alert and no distress  HENT: ear canals and TM's normal, nose and mouth without ulcers or lesions  NECK: no adenopathy, no asymmetry, masses, or scars and thyroid normal to palpation  RESP: lungs clear to auscultation - no rales, rhonchi or wheezes  CV: regular rate and rhythm, normal S1 S2, no S3 or S4, no murmur, click or rub, no peripheral edema and peripheral pulses strong  ABDOMEN: soft, nontender, no hepatosplenomegaly, no masses and bowel sounds normal  MS: no gross musculoskeletal defects noted, no edema  PSYCH: mentation appears normal, affect normal/bright    Diagnostic Test Results:  Labs reviewed in Epic  Results for orders placed or performed in visit on 07/31/19   CBC with platelets differential   Result Value Ref Range    WBC 10.9 4.0 - 11.0 10e9/L    RBC Count 4.91 3.8 - 5.2 10e12/L    Hemoglobin 10.4 (L) 11.7 - 15.7 g/dL    Hematocrit 35.1 35.0 - 47.0 %    MCV 72 (L) 78 - 100 fl    MCH " "21.2 (L) 26.5 - 33.0 pg    MCHC 29.6 (L) 31.5 - 36.5 g/dL    RDW 16.6 (H) 10.0 - 15.0 %    Platelet Count 449 150 - 450 10e9/L    Diff Method Automated Method     % Neutrophils 59.0 %    % Lymphocytes 31.3 %    % Monocytes 7.2 %    % Eosinophils 1.4 %    % Basophils 0.7 %    % Immature Granulocytes 0.4 %    Nucleated RBCs 0 0 /100    Absolute Neutrophil 6.4 1.6 - 8.3 10e9/L    Absolute Lymphocytes 3.4 0.8 - 5.3 10e9/L    Absolute Monocytes 0.8 0.0 - 1.3 10e9/L    Absolute Eosinophils 0.2 0.0 - 0.7 10e9/L    Absolute Basophils 0.1 0.0 - 0.2 10e9/L    Abs Immature Granulocytes 0.0 0 - 0.4 10e9/L    Absolute Nucleated RBC 0.0            Assessment & Plan     (R91.8) Pulmonary nodules  (primary encounter diagnosis)  Comment:   Plan: XR CHEST 2 VW (Clinic Performed)        Recheck though she didn't have nodules in January 2019    (J06.9,  B97.89) Viral URI with cough  Comment:   Plan: CBC with platelets differential        Conservative measures discussed     BMI:   Estimated body mass index is 44.99 kg/m  as calculated from the following:    Height as of this encounter: 1.6 m (5' 3\").    Weight as of this encounter: 115.2 kg (254 lb).               No follow-ups on file.    Sol Kim MD  [facility] - [place]      "

## 2019-07-31 NOTE — TELEPHONE ENCOUNTER
"    Reason for Disposition    SEVERE coughing spells (e.g., whooping sound after coughing, vomiting after coughing)    Additional Information    Negative: Severe difficulty breathing (e.g., struggling for each breath, speaks in single words)    Negative: Bluish (or gray) lips or face now    Negative: [1] Difficulty breathing AND [2] exposure to flames, smoke, or fumes    Negative: [1] Stridor AND [2] difficulty breathing    Negative: Sounds like a life-threatening emergency to the triager    Negative: [1] Previous asthma attacks AND [2] this feels like asthma attack    Negative: Dry (non-productive) cough (i.e., no sputum or minimal clear sputum)    Negative: Chest pain  (Exception: MILD central chest pain, present only when coughing)    Negative: Difficulty breathing    Negative: Patient sounds very sick or weak to the triager    Negative: [1] Coughed up blood AND [2] > 1 tablespoon (15 ml) (Exception: blood-tinged sputum)    Negative: Fever > 103 F (39.4 C)    Negative: [1] Fever > 101 F (38.3 C) AND [2] age > 60    Negative: [1] Fever > 100.0 F (37.8 C) AND [2] bedridden (e.g., nursing home patient, CVA, chronic illness, recovering from surgery)    Negative: [1] Fever > 100.0 F (37.8 C) AND [2] diabetes mellitus or weak immune system (e.g., HIV positive, cancer chemo, splenectomy, chronic steroids)    Negative: Wheezing is present    Answer Assessment - Initial Assessment Questions  1. ONSET: \"When did the cough begin?\"       Week ago  2. SEVERITY: \"How bad is the cough today?\"       Throwing up 3 times because of the cough and is nauseas  3. RESPIRATORY DISTRESS: \"Describe your breathing.\"       rattles  4. FEVER: \"Do you have a fever?\" If so, ask: \"What is your temperature, how was it measured, and when did it start?\"      unknown  5. SPUTUM: \"Describe the color of your sputum\" (clear, white, yellow, green)      Dark green  6. HEMOPTYSIS: \"Are you coughing up any blood?\" If so ask: \"How much?\" (barbs, streaks, " "tablespoons, etc.)      no  7. CARDIAC HISTORY: \"Do you have any history of heart disease?\" (e.g., heart attack, congestive heart failure)       no  8. LUNG HISTORY: \"Do you have any history of lung disease?\"  (e.g., pulmonary embolus, asthma, emphysema)      asthma  9. PE RISK FACTORS: \"Do you have a history of blood clots?\" (or: recent major surgery, recent prolonged travel, bedridden )      no  10. OTHER SYMPTOMS: \"Do you have any other symptoms?\" (e.g., runny nose, wheezing, chest pain)        Wheezing when coughs, yes  11. PREGNANCY: \"Is there any chance you are pregnant?\" \"When was your last menstrual period?\"        no  12. TRAVEL: \"Have you traveled out of the country in the last month?\" (e.g., travel history, exposures)        no    Protocols used: COUGH - ACUTE [id number]      "

## 2019-07-31 NOTE — LETTER
July 31, 2019      Kamila Ma  [address]   [place] [address]        To Whom It May Concern:    Kamila Ma was seen in our clinic. She may return to work with the following: no restrictions on or about 8/2/19.      Sincerely,        Sol Kim MD

## 2019-08-01 ENCOUNTER — TELEPHONE (OUTPATIENT)
Dept: FAMILY MEDICINE | Facility: OTHER | Age: 24
End: 2019-08-01

## 2019-08-01 NOTE — TELEPHONE ENCOUNTER
Pt calls back , lab results and CXR results given along with doctor's comments.    Esperanza Trujillo

## 2019-09-05 ENCOUNTER — OFFICE VISIT (OUTPATIENT)
Dept: FAMILY MEDICINE | Facility: OTHER | Age: 24
End: 2019-09-05
Attending: NURSE PRACTITIONER
Payer: COMMERCIAL

## 2019-09-05 VITALS
BODY MASS INDEX: 45.54 KG/M2 | WEIGHT: 257 LBS | TEMPERATURE: 99.8 F | DIASTOLIC BLOOD PRESSURE: 70 MMHG | OXYGEN SATURATION: 99 % | RESPIRATION RATE: 18 BRPM | SYSTOLIC BLOOD PRESSURE: 110 MMHG | HEART RATE: 74 BPM | HEIGHT: 63 IN

## 2019-09-05 DIAGNOSIS — R82.90 ABNORMAL URINE FINDINGS: ICD-10-CM

## 2019-09-05 DIAGNOSIS — R30.0 DYSURIA: Primary | ICD-10-CM

## 2019-09-05 DIAGNOSIS — N30.01 ACUTE CYSTITIS WITH HEMATURIA: ICD-10-CM

## 2019-09-05 LAB
ALBUMIN UR-MCNC: NEGATIVE MG/DL
APPEARANCE UR: CLEAR
BACTERIA #/AREA URNS HPF: ABNORMAL /HPF
BILIRUB UR QL STRIP: NEGATIVE
COLOR UR AUTO: YELLOW
GLUCOSE UR STRIP-MCNC: NEGATIVE MG/DL
HGB UR QL STRIP: ABNORMAL
KETONES UR STRIP-MCNC: NEGATIVE MG/DL
LEUKOCYTE ESTERASE UR QL STRIP: ABNORMAL
NITRATE UR QL: POSITIVE
PH UR STRIP: 5.5 PH (ref 5–7)
RBC #/AREA URNS AUTO: ABNORMAL /HPF
SOURCE: ABNORMAL
SP GR UR STRIP: 1.02 (ref 1–1.03)
UROBILINOGEN UR STRIP-ACNC: 0.2 EU/DL (ref 0.2–1)
WBC #/AREA URNS AUTO: ABNORMAL /HPF

## 2019-09-05 PROCEDURE — 99213 OFFICE O/P EST LOW 20 MIN: CPT | Performed by: NURSE PRACTITIONER

## 2019-09-05 PROCEDURE — 87088 URINE BACTERIA CULTURE: CPT | Performed by: NURSE PRACTITIONER

## 2019-09-05 PROCEDURE — 87186 SC STD MICRODIL/AGAR DIL: CPT | Mod: 59 | Performed by: NURSE PRACTITIONER

## 2019-09-05 PROCEDURE — 87086 URINE CULTURE/COLONY COUNT: CPT | Performed by: NURSE PRACTITIONER

## 2019-09-05 PROCEDURE — 81001 URINALYSIS AUTO W/SCOPE: CPT | Performed by: NURSE PRACTITIONER

## 2019-09-05 RX ORDER — CIPROFLOXACIN 500 MG/1
500 TABLET, FILM COATED ORAL 2 TIMES DAILY
Qty: 14 TABLET | Refills: 0 | Status: SHIPPED | OUTPATIENT
Start: 2019-09-05 | End: 2019-12-20

## 2019-09-05 ASSESSMENT — PAIN SCALES - GENERAL: PAINLEVEL: MILD PAIN (2)

## 2019-09-05 ASSESSMENT — MIFFLIN-ST. JEOR: SCORE: 1879.87

## 2019-09-05 NOTE — PATIENT INSTRUCTIONS
"  Assessment & Plan     1. Dysuria  - *UA reflex to Microscopic and Culture - [facility]/[place]  - Urine Microscopic    2. Abnormal urine findings  Culture pending  - Urine Culture Aerobic Bacterial    3. Acute cystitis with hematuria  Increase fluids  - ciprofloxacin (CIPRO) 500 MG tablet; Take 1 tablet (500 mg) by mouth 2 times daily for 7 days  Dispense: 14 tablet; Refill: 0  - consider probiotic      BMI:   Estimated body mass index is 45.53 kg/m  as calculated from the following:    Height as of this encounter: 1.6 m (5' 3\").    Weight as of this encounter: 116.6 kg (257 lb).           Return if symptoms worsen or fail to improve.    Saadia Cross NP  [facility] - [facility]      Patient Education     Bladder Infection, Female (Adult)    Urine is normally doesn't have any bacteria in it. But bacteria can get into the urinary tract from the skin around the rectum. Or they can travel in the blood from elsewhere in the body. Once they are in your urinary tract, they can cause infection in the urethra (urethritis), the bladder (cystitis), or the kidneys (pyelonephritis).  The most common place for an infection is in the bladder. This is called a bladder infection. This is one of the most common infections in women. Most bladder infections are easily treated. They are not serious unless the infection spreads to the kidney.  The phrases \"bladder infection,\" \"UTI,\" and \"cystitis\" are often used to describe the same thing. But they are not always the same. Cystitis is an inflammation of the bladder. The most common cause of cystitis is an infection.  Symptoms  The infection causes inflammation in the urethra and bladder. This causes many of the symptoms. The most common symptoms of a bladder infection are:    Pain or burning when urinating    Having to urinate more often than usual    Urgent need to urinate    Only a small amount of urine comes out    Blood in urine    Abdominal discomfort. This is " usually in the lower abdomen above the pubic bone.    Cloudy urine    Strong- or bad-smelling urine    Unable to urinate (urinary retention)    Unable to hold urine in (urinary incontinence)    Fever    Loss of appetite    Confusion (in older adults)  Causes  Bladder infections are not contagious. You can't get one from someone else, from a toilet seat, or from sharing a bath.  The most common cause of bladder infections is bacteria from the bowels. The bacteria get onto the skin around the opening of the urethra. From there, they can get into the urine and travel up to the bladder, causing inflammation and infection. This usually happens because of:    Wiping improperly after urinating. Always wipe from front to back.    Bowel incontinence    Pregnancy    Procedures such as having a catheter inserted    Older age    Not emptying your bladder. This can allow bacteria a chance to grow in your urine.    Dehydration    Constipation    Sex    Use of a diaphragm for birth control   Treatment  Bladder infections are diagnosed by a urine test. They are treated with antibiotics and usually clear up quickly without complications. Treatment helps prevent a more serious kidney infection.  Medicines  Medicines can help in the treatment of a bladder infection:    Take antibiotics until they are used up, even if you feel better. It is important to finish them to make sure the infection has cleared.    You can use acetaminophen or ibuprofen for pain, fever, or discomfort, unless another medicine was prescribed. If you have chronic liver or kidney disease, talk with your healthcare provider before using these medicines. Also talk with your provider if you've ever had a stomach ulcer or gastrointestinal bleeding, or are taking blood-thinner medicines.    If you are given phenazopydridine to reduce burning with urination, it will cause your urine to become a bright orange color. This can stain clothing.  Care and prevention  These  self-care steps can help prevent future infections:    Drink plenty of fluids to prevent dehydration and flush out your bladder. Do this unless you must restrict fluids for other health reasons, or your doctor told you not to.    Proper cleaning after going to the bathroom is important. Wipe from front to back after using the toilet to prevent the spread of bacteria.    Urinate more often. Don't try to hold urine in for a long time.    Wear loose-fitting clothes and cotton underwear. Avoid tight-fitting pants.    Improve your diet and prevent constipation. Eat more fresh fruit and vegetables, and fiber, and less junk and fatty foods.    Avoid sex until your symptoms are gone.    Avoid caffeine, alcohol, and spicy foods. These can irritate your bladder.    Urinate right after intercourse to flush out your bladder.    If you use birth control pills and have frequent bladder infections, discuss it with your doctor.  Follow-up care  Call your healthcare provider if all symptoms are not gone after 3 days of treatment. This is especially important if you have repeat infections.  If a culture was done, you will be told if your treatment needs to be changed. If directed, you can call to find out the results.  If X-rays were done, you will be told if the results will affect your treatment.  Call 911  Call 911 if any of the following occur:    Trouble breathing    Hard to wake up or confusion    Fainting or loss of consciousness    Rapid heart rate  When to seek medical advice  Call your healthcare provider right away if any of these occur:    Fever of 100.4 F (38.0 C) or higher, or as directed by your healthcare provider    Symptoms are not better by the third day of treatment    Back or belly (abdominal) pain that gets worse    Repeated vomiting, or unable to keep medicine down    Weakness or dizziness    Vaginal discharge    Pain, redness, or swelling in the outer vaginal area (labia)  Date Last Reviewed: 10/1/2016     [id number] [employer]. [address] [place] [address], [place] [address]. All rights reserved. This information is not intended as a substitute for professional medical care. Always follow your healthcare professional's instructions.

## 2019-09-05 NOTE — NURSING NOTE
"Chief Complaint   Patient presents with     Urinary Problem       Initial /70 (BP Location: Left arm, Patient Position: Sitting, Cuff Size: Adult Large)   Pulse 74   Temp 99.8  F (37.7  C) (Tympanic)   Resp 18   Ht 1.6 m (5' 3\")   Wt 116.6 kg (257 lb)   SpO2 99%   BMI 45.53 kg/m   Estimated body mass index is 45.53 kg/m  as calculated from the following:    Height as of this encounter: 1.6 m (5' 3\").    Weight as of this encounter: 116.6 kg (257 lb).  Medication Reconciliation: complete   Romi Chacon LPN    "

## 2019-09-05 NOTE — PROGRESS NOTES
Subjective     Kamila Ma is a 24 year old female who presents to clinic today for the following health issues:    HPI   URINARY TRACT SYMPTOMS      Duration: 2 days    Description  dysuria, frequency, urgency, hematuria and back pain    Intensity:  moderate    Accompanying signs and symptoms:  Fever/chills: YES  Flank pain YES  Nausea and vomiting: YES- nausea  Vaginal symptoms: none  Abdominal/Pelvic Pain: YES    History  History of frequent UTI's: YES  History of kidney stones: no   Sexually Active: YES  Possibility of pregnancy: No    Precipitating or alleviating factors: None    Therapies tried and outcome: none   Outcome:         Patient Active Problem List   Diagnosis     Mixed hearing loss     Acute recurrent maxillary sinusitis     ACP (advance care planning)     Other iron deficiency anemia     Bariatric surgery status     Vasovagal near syncope     Crouzon craniofacial dysostosis     Morbid obesity (H)     Pulmonary nodules     Past Surgical History:   Procedure Laterality Date     ADENOIDECTOMY       GI SURGERY      VSG      MOUTH SURGERY  8/2011    wisdom teeth extracted     PET placement       sagital suture release  6/1994     SEPTOPLASTY, TURBINOPLASTY, COMBINED N/A 6/13/2017    Procedure: COMBINED SEPTOPLASTY, TURBINOPLASTY;  SEPTOPLASTY, TURBINATE REDUCTION;  Surgeon: Gina Casillas MD;  Location: [facility]     TONSILLECTOMY         Social History     Tobacco Use     Smoking status: Never Smoker     Smokeless tobacco: Never Used   Substance Use Topics     Alcohol use: No     Family History   Problem Relation Age of Onset     Hypertension Mother      Migraines Mother      Diabetes Paternal Grandfather      Hypertension Maternal Uncle      Hypertension Maternal Grandfather      Hypertension Maternal Grandmother      Depression Sister      Unknown/Adopted Paternal Grandmother          Current Outpatient Medications   Medication Sig Dispense Refill     Calcium-Vitamin D-Vitamin K  "(VIACTIV PO) Take 1 tablet by mouth daily.        ciprofloxacin (CIPRO) 500 MG tablet Take 1 tablet (500 mg) by mouth 2 times daily for 7 days 14 tablet 0     ferrous sulfate (IRON) 325 (65 FE) MG tablet Take 1 tablet (325 mg) by mouth 2 times daily 60 tablet 1     Pediatric Multivit-Minerals-C (FLINTSTONES COMPLETE PO) Take 1 tablet by mouth daily.        Allergies   Allergen Reactions     Hydrocodone-Acetaminophen Nausea and Vomiting     Morphine Nausea and Vomiting     Sulfa Drugs Hives     SULFA (SULFONAMIDE ANTIBIOTICS)     Topamax Diarrhea     Topiramate Diarrhea     Recent Labs   Lab Test 01/25/19  0955 01/13/19  2131 02/02/17  1136 10/10/16  1219 07/18/16  0829   ALT  --  28 19 18 15   CR  --  0.68 0.74 0.67 0.76   GFRESTIMATED  --  >90 >90  Non  GFR Calc   >90  Non  GFR Calc   >90  Non  GFR Calc     GFRESTBLACK  --  >90 >90   GFR Calc   >90   GFR Calc   >90   GFR Calc     POTASSIUM  --  3.3* 4.2 4.2 4.0   TSH 1.27  --   --   --  1.78      BP Readings from Last 3 Encounters:   09/05/19 110/70   07/31/19 104/58   03/21/19 110/58    Wt Readings from Last 3 Encounters:   09/05/19 116.6 kg (257 lb)   07/31/19 115.2 kg (254 lb)   03/21/19 108.9 kg (240 lb)               Reviewed and updated as needed this visit by Provider  Allergies         Review of Systems   ROS COMP: Constitutional, HEENT, cardiovascular, pulmonary, gi and gu systems are negative, except as otherwise noted.      Objective    /70 (BP Location: Left arm, Patient Position: Sitting, Cuff Size: Adult Large)   Pulse 74   Temp 99.8  F (37.7  C) (Tympanic)   Resp 18   Ht 1.6 m (5' 3\")   Wt 116.6 kg (257 lb)   SpO2 99%   BMI 45.53 kg/m    Body mass index is 45.53 kg/m .  Physical Exam   GENERAL: healthy, alert and no distress  RESP: lungs clear to auscultation - no rales, rhonchi or wheezes  CV: regular rate and rhythm, normal S1 S2, no S3 or " "S4, no murmur, click or rub, no peripheral edema and peripheral pulses strong  MS: no gross musculoskeletal defects noted, no edema  BACK: no CVA tenderness, no paralumbar tenderness  PSYCH: mentation appears normal, affect normal/bright      Results for orders placed or performed in visit on 09/05/19   *UA reflex to Microscopic and Culture - [facility]/[place]   Result Value Ref Range    Color Urine Yellow     Appearance Urine Clear     Glucose Urine Negative NEG^Negative mg/dL    Bilirubin Urine Negative NEG^Negative    Ketones Urine Negative NEG^Negative mg/dL    Specific Gravity Urine 1.025 1.003 - 1.035    Blood Urine Moderate (A) NEG^Negative    pH Urine 5.5 5.0 - 7.0 pH    Protein Albumin Urine Negative NEG^Negative mg/dL    Urobilinogen Urine 0.2 0.2 - 1.0 EU/dL    Nitrite Urine Positive (A) NEG^Negative    Leukocyte Esterase Urine Small (A) NEG^Negative    Source Midstream Urine    Urine Microscopic   Result Value Ref Range    WBC Urine 10-25 (A) OTO5^0 - 5 /HPF    RBC Urine O - 2 OTO2^O - 2 /HPF    Bacteria Urine Many (A) NEG^Negative /HPF       Assessment & Plan     1. Dysuria  - *UA reflex to Microscopic and Culture - [facility]/[place]  - Urine Microscopic    2. Abnormal urine findings  Culture pending  - Urine Culture Aerobic Bacterial    3. Acute cystitis with hematuria  Increase fluids  - ciprofloxacin (CIPRO) 500 MG tablet; Take 1 tablet (500 mg) by mouth 2 times daily for 7 days  Dispense: 14 tablet; Refill: 0  - consider probiotic      BMI:   Estimated body mass index is 45.53 kg/m  as calculated from the following:    Height as of this encounter: 1.6 m (5' 3\").    Weight as of this encounter: 116.6 kg (257 lb).           Return if symptoms worsen or fail to improve.    Saadia Cross NP  [facility] - [facility]      "

## 2019-09-07 LAB
BACTERIA SPEC CULT: ABNORMAL
Lab: ABNORMAL
SPECIMEN SOURCE: ABNORMAL

## 2019-11-01 ENCOUNTER — HOSPITAL ENCOUNTER (EMERGENCY)
Facility: HOSPITAL | Age: 24
Discharge: HOME | End: 2019-11-01
Attending: FAMILY MEDICINE | Admitting: FAMILY MEDICINE
Payer: COMMERCIAL

## 2019-11-01 ENCOUNTER — APPOINTMENT (OUTPATIENT)
Dept: ULTRASOUND IMAGING | Facility: HOSPITAL | Age: 24
End: 2019-11-01
Attending: FAMILY MEDICINE
Payer: COMMERCIAL

## 2019-11-01 ENCOUNTER — APPOINTMENT (OUTPATIENT)
Dept: GENERAL RADIOLOGY | Facility: HOSPITAL | Age: 24
End: 2019-11-01
Attending: FAMILY MEDICINE
Payer: COMMERCIAL

## 2019-11-01 VITALS
DIASTOLIC BLOOD PRESSURE: 88 MMHG | TEMPERATURE: 97.9 F | RESPIRATION RATE: 16 BRPM | SYSTOLIC BLOOD PRESSURE: 106 MMHG | OXYGEN SATURATION: 99 %

## 2019-11-01 DIAGNOSIS — N83.209 RUPTURED OVARIAN CYST: ICD-10-CM

## 2019-11-01 LAB
ALBUMIN SERPL-MCNC: 3.6 G/DL (ref 3.4–5)
ALBUMIN UR-MCNC: NEGATIVE MG/DL
ALP SERPL-CCNC: 67 U/L (ref 40–150)
ALT SERPL W P-5'-P-CCNC: 22 U/L (ref 0–50)
ANION GAP SERPL CALCULATED.3IONS-SCNC: 4 MMOL/L (ref 3–14)
APPEARANCE UR: CLEAR
AST SERPL W P-5'-P-CCNC: 12 U/L (ref 0–45)
BACTERIA #/AREA URNS HPF: ABNORMAL /HPF
BASOPHILS # BLD AUTO: 0.1 10E9/L (ref 0–0.2)
BASOPHILS NFR BLD AUTO: 0.8 %
BILIRUB SERPL-MCNC: 0.3 MG/DL (ref 0.2–1.3)
BILIRUB UR QL STRIP: NEGATIVE
BUN SERPL-MCNC: 14 MG/DL (ref 7–30)
CALCIUM SERPL-MCNC: 9.4 MG/DL (ref 8.5–10.1)
CANCER AG125 SERPL-ACNC: 7 U/ML (ref 0–30)
CHLORIDE SERPL-SCNC: 109 MMOL/L (ref 94–109)
CO2 SERPL-SCNC: 24 MMOL/L (ref 20–32)
COLOR UR AUTO: ABNORMAL
CREAT SERPL-MCNC: 0.63 MG/DL (ref 0.52–1.04)
CRP SERPL-MCNC: 3.9 MG/L (ref 0–8)
DIFFERENTIAL METHOD BLD: ABNORMAL
EOSINOPHIL # BLD AUTO: 0.1 10E9/L (ref 0–0.7)
EOSINOPHIL NFR BLD AUTO: 1.3 %
ERYTHROCYTE [DISTWIDTH] IN BLOOD BY AUTOMATED COUNT: 16.9 % (ref 10–15)
GFR SERPL CREATININE-BSD FRML MDRD: >90 ML/MIN/{1.73_M2}
GLUCOSE SERPL-MCNC: 85 MG/DL (ref 70–99)
GLUCOSE UR STRIP-MCNC: NEGATIVE MG/DL
HCT VFR BLD AUTO: 33.3 % (ref 35–47)
HGB BLD-MCNC: 9.8 G/DL (ref 11.7–15.7)
HGB UR QL STRIP: NEGATIVE
IMM GRANULOCYTES # BLD: 0 10E9/L (ref 0–0.4)
IMM GRANULOCYTES NFR BLD: 0.3 %
KETONES UR STRIP-MCNC: NEGATIVE MG/DL
LEUKOCYTE ESTERASE UR QL STRIP: ABNORMAL
LYMPHOCYTES # BLD AUTO: 1.9 10E9/L (ref 0.8–5.3)
LYMPHOCYTES NFR BLD AUTO: 24.4 %
MCH RBC QN AUTO: 20.4 PG (ref 26.5–33)
MCHC RBC AUTO-ENTMCNC: 29.4 G/DL (ref 31.5–36.5)
MCV RBC AUTO: 69 FL (ref 78–100)
MONOCYTES # BLD AUTO: 0.7 10E9/L (ref 0–1.3)
MONOCYTES NFR BLD AUTO: 9.1 %
MUCOUS THREADS #/AREA URNS LPF: PRESENT /LPF
NEUTROPHILS # BLD AUTO: 4.9 10E9/L (ref 1.6–8.3)
NEUTROPHILS NFR BLD AUTO: 64.1 %
NITRATE UR QL: NEGATIVE
NRBC # BLD AUTO: 0 10*3/UL
NRBC BLD AUTO-RTO: 0 /100
PH UR STRIP: 5.5 PH (ref 4.7–8)
PLATELET # BLD AUTO: 372 10E9/L (ref 150–450)
POTASSIUM SERPL-SCNC: 4 MMOL/L (ref 3.4–5.3)
PROT SERPL-MCNC: 7.3 G/DL (ref 6.8–8.8)
RBC # BLD AUTO: 4.8 10E12/L (ref 3.8–5.2)
RBC #/AREA URNS AUTO: 1 /HPF (ref 0–2)
SODIUM SERPL-SCNC: 137 MMOL/L (ref 133–144)
SOURCE: ABNORMAL
SP GR UR STRIP: 1.03 (ref 1–1.03)
SQUAMOUS #/AREA URNS AUTO: 6 /HPF (ref 0–1)
UROBILINOGEN UR STRIP-MCNC: NORMAL MG/DL (ref 0–2)
WBC # BLD AUTO: 7.6 10E9/L (ref 4–11)
WBC #/AREA URNS AUTO: 2 /HPF (ref 0–5)

## 2019-11-01 PROCEDURE — 81001 URINALYSIS AUTO W/SCOPE: CPT | Performed by: FAMILY MEDICINE

## 2019-11-01 PROCEDURE — 86304 IMMUNOASSAY TUMOR CA 125: CPT | Performed by: FAMILY MEDICINE

## 2019-11-01 PROCEDURE — 99284 EMERGENCY DEPT VISIT MOD MDM: CPT | Mod: 25

## 2019-11-01 PROCEDURE — 25800030 ZZH RX IP 258 OP 636: Performed by: FAMILY MEDICINE

## 2019-11-01 PROCEDURE — 85025 COMPLETE CBC W/AUTO DIFF WBC: CPT | Performed by: FAMILY MEDICINE

## 2019-11-01 PROCEDURE — 96361 HYDRATE IV INFUSION ADD-ON: CPT

## 2019-11-01 PROCEDURE — 96374 THER/PROPH/DIAG INJ IV PUSH: CPT

## 2019-11-01 PROCEDURE — 36415 COLL VENOUS BLD VENIPUNCTURE: CPT | Performed by: FAMILY MEDICINE

## 2019-11-01 PROCEDURE — 74019 RADEX ABDOMEN 2 VIEWS: CPT | Mod: TC

## 2019-11-01 PROCEDURE — 25000128 H RX IP 250 OP 636: Performed by: FAMILY MEDICINE

## 2019-11-01 PROCEDURE — 76830 TRANSVAGINAL US NON-OB: CPT | Mod: TC

## 2019-11-01 PROCEDURE — 99284 EMERGENCY DEPT VISIT MOD MDM: CPT | Mod: Z6 | Performed by: FAMILY MEDICINE

## 2019-11-01 PROCEDURE — 80053 COMPREHEN METABOLIC PANEL: CPT | Performed by: FAMILY MEDICINE

## 2019-11-01 PROCEDURE — 86140 C-REACTIVE PROTEIN: CPT | Performed by: FAMILY MEDICINE

## 2019-11-01 PROCEDURE — 96375 TX/PRO/DX INJ NEW DRUG ADDON: CPT

## 2019-11-01 RX ORDER — IBUPROFEN 800 MG/1
800 TABLET, FILM COATED ORAL EVERY 8 HOURS PRN
Qty: 60 TABLET | Refills: 0 | Status: SHIPPED | OUTPATIENT
Start: 2019-11-01 | End: 2019-12-20

## 2019-11-01 RX ORDER — ONDANSETRON 2 MG/ML
4 INJECTION INTRAMUSCULAR; INTRAVENOUS ONCE
Status: COMPLETED | OUTPATIENT
Start: 2019-11-01 | End: 2019-11-01

## 2019-11-01 RX ORDER — KETOROLAC TROMETHAMINE 30 MG/ML
30 INJECTION, SOLUTION INTRAMUSCULAR; INTRAVENOUS ONCE
Status: COMPLETED | OUTPATIENT
Start: 2019-11-01 | End: 2019-11-01

## 2019-11-01 RX ORDER — SODIUM CHLORIDE 9 MG/ML
1000 INJECTION, SOLUTION INTRAVENOUS CONTINUOUS
Status: DISCONTINUED | OUTPATIENT
Start: 2019-11-01 | End: 2019-11-01 | Stop reason: HOSPADM

## 2019-11-01 RX ADMIN — SODIUM CHLORIDE 1000 ML: 9 INJECTION, SOLUTION INTRAVENOUS at 08:42

## 2019-11-01 RX ADMIN — KETOROLAC TROMETHAMINE 30 MG: 30 INJECTION, SOLUTION INTRAMUSCULAR; INTRAVENOUS at 10:43

## 2019-11-01 RX ADMIN — ONDANSETRON 4 MG: 2 INJECTION INTRAMUSCULAR; INTRAVENOUS at 08:45

## 2019-11-01 ASSESSMENT — ENCOUNTER SYMPTOMS
SHORTNESS OF BREATH: 0
NEUROLOGICAL NEGATIVE: 1
CONSTIPATION: 0
ABDOMINAL PAIN: 1
DYSURIA: 0
PALPITATIONS: 0
ACTIVITY CHANGE: 1
NAUSEA: 1
VOMITING: 0
DIARRHEA: 0
MUSCULOSKELETAL NEGATIVE: 1
PSYCHIATRIC NEGATIVE: 1
FEVER: 0

## 2019-11-01 NOTE — ED TRIAGE NOTES
Pt started with abdominal pain during NOC.  Pt did have a BM last NOC, and then this AM.  Pt currently rated abdominal pain 1/10, but at it's worst 8/10.  Pt has had bouts of nausea for the past 3 weeks, history of hiatal hernia.

## 2019-11-01 NOTE — ED PROVIDER NOTES
History     Chief Complaint   Patient presents with     Abdominal Pain     started this morning around 0400. left lower quadrant. nauseated.      HPI  Kamila Ma is a 24 year old female who was awakened from sleep at 0400 by sharp pain in her left lower quadrant.  She has been nauseated.  She states her bowels been working normally, had a bowel movement last evening.  She is approximately mid menstrual cycle at this time.  She has had a history of constipation, the pain she has from that is usually left upper quadrant and so she does not feel that is the same.  She denies any urinary symptoms, periods have been normal.    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  Kamila Ma has no plan or code status on file. Discussed available resources and provided with information. Confirmed code status reflects current choices pending further ACP discussions.  Confirmed/documented legally designated decision makers.  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     Diarrhea 01/01/2011     Hearing loss 01/01/2011     Mixed hearing loss 12/4/2015     Multiple allergies 01/01/2011     Other iron deficiency anemia 7/18/2016     Otomycosis of right ear 09/10/2012     Vasovagal near syncope 7/18/2016       Past Surgical History:    Past Surgical History:   Procedure Laterality Date     ADENOIDECTOMY       GI SURGERY      VSG      MOUTH SURGERY  8/2011    wisdom teeth extracted     PET placement       sagital suture release  6/1994     SEPTOPLASTY, TURBINOPLASTY, COMBINED N/A 6/13/2017    Procedure: COMBINED SEPTOPLASTY, TURBINOPLASTY;  SEPTOPLASTY, TURBINATE REDUCTION;  Surgeon: Gina Casillas MD;  Location: [facility]     TONSILLECTOMY         Family History:    Family History   Problem Relation Age of Onset     Hypertension Mother      Migraines Mother      Diabetes Paternal Grandfather      Hypertension Maternal Uncle      Hypertension Maternal Grandfather      Hypertension Maternal Grandmother      Depression Sister      Unknown/Adopted Paternal Grandmother        Social History:  Marital Status:  Single [1]  Social History     Tobacco Use     Smoking status: Never Smoker     Smokeless tobacco: Never Used   Substance Use Topics     Alcohol use: No     Drug use: No        Medications:    ibuprofen (ADVIL/MOTRIN) 800 MG tablet  Calcium-Vitamin D-Vitamin K (VIACTIV PO)  ferrous sulfate (IRON) 325 (65 FE) MG tablet  Pediatric Multivit-Minerals-C (FLINTSTONES COMPLETE PO)          Review of Systems   Constitutional: Positive for activity change. Negative for fever.   HENT: Negative.    Respiratory: Negative for shortness of breath.    Cardiovascular: Negative for chest pain and palpitations.   Gastrointestinal: Positive for abdominal pain and nausea. Negative for  constipation, diarrhea and vomiting.   Genitourinary: Positive for pelvic pain. Negative for dysuria, vaginal bleeding and vaginal discharge.   Musculoskeletal: Negative.    Skin: Negative.    Neurological: Negative.    Psychiatric/Behavioral: Negative.        Physical Exam   BP: 142/73  Heart Rate: 97  Temp: 98.3  F (36.8  C)  Resp: 16  SpO2: 99 %      Physical Exam  Vitals signs and nursing note reviewed.   Constitutional:       General: She is not in acute distress.     Appearance: She is well-developed.   HENT:      Head: Normocephalic and atraumatic.   Cardiovascular:      Rate and Rhythm: Normal rate and regular rhythm.      Heart sounds: Normal heart sounds. No murmur.   Pulmonary:      Effort: Pulmonary effort is normal.      Breath sounds: Normal breath sounds.   Abdominal:      General: Abdomen is flat. Bowel sounds are normal. There is no distension.      Palpations: Abdomen is soft.      Tenderness: There is tenderness in the left upper quadrant and left lower quadrant. There is guarding. There is no rebound.      Comments: Palpation of the right lower quadrant radiates to the left lower quadrant, left upper quadrant less tender than left lower.   Skin:     General: Skin is warm and dry.      Capillary Refill: Capillary refill takes less than 2 seconds.   Neurological:      Mental Status: She is alert and oriented to person, place, and time.   Psychiatric:         Mood and Affect: Mood normal.         Behavior: Behavior normal.         ED Course        Procedures    Results for orders placed or performed during the hospital encounter of 11/01/19 (from the past 24 hour(s))   Comprehensive metabolic panel   Result Value Ref Range    Sodium 137 133 - 144 mmol/L    Potassium 4.0 3.4 - 5.3 mmol/L    Chloride 109 94 - 109 mmol/L    Carbon Dioxide 24 20 - 32 mmol/L    Anion Gap 4 3 - 14 mmol/L    Glucose 85 70 - 99 mg/dL    Urea Nitrogen 14 7 - 30 mg/dL    Creatinine 0.63 0.52 - 1.04 mg/dL    GFR Estimate >90  >60 mL/min/[1.73_m2]    GFR Estimate If Black >90 >60 mL/min/[1.73_m2]    Calcium 9.4 8.5 - 10.1 mg/dL    Bilirubin Total 0.3 0.2 - 1.3 mg/dL    Albumin 3.6 3.4 - 5.0 g/dL    Protein Total 7.3 6.8 - 8.8 g/dL    Alkaline Phosphatase 67 40 - 150 U/L    ALT 22 0 - 50 U/L    AST 12 0 - 45 U/L   CRP inflammation   Result Value Ref Range    CRP Inflammation 3.9 0.0 - 8.0 mg/L   UA reflex to Microscopic and Culture   Result Value Ref Range    Color Urine Light Yellow     Appearance Urine Clear     Glucose Urine Negative NEG^Negative mg/dL    Bilirubin Urine Negative NEG^Negative    Ketones Urine Negative NEG^Negative mg/dL    Specific Gravity Urine 1.029 1.003 - 1.035    Blood Urine Negative NEG^Negative    pH Urine 5.5 4.7 - 8.0 pH    Protein Albumin Urine Negative NEG^Negative mg/dL    Urobilinogen mg/dL Normal 0.0 - 2.0 mg/dL    Nitrite Urine Negative NEG^Negative    Leukocyte Esterase Urine Trace (A) NEG^Negative    Source Midstream Urine     RBC Urine 1 0 - 2 /HPF    WBC Urine 2 0 - 5 /HPF    Bacteria Urine Few (A) NEG^Negative /HPF    Squamous Epithelial /HPF Urine 6 (H) 0 - 1 /HPF    Mucous Urine Present (A) NEG^Negative /LPF   CBC with platelets differential   Result Value Ref Range    WBC 7.6 4.0 - 11.0 10e9/L    RBC Count 4.80 3.8 - 5.2 10e12/L    Hemoglobin 9.8 (L) 11.7 - 15.7 g/dL    Hematocrit 33.3 (L) 35.0 - 47.0 %    MCV 69 (L) 78 - 100 fl    MCH 20.4 (L) 26.5 - 33.0 pg    MCHC 29.4 (L) 31.5 - 36.5 g/dL    RDW 16.9 (H) 10.0 - 15.0 %    Platelet Count 372 150 - 450 10e9/L    Diff Method Automated Method     % Neutrophils 64.1 %    % Lymphocytes 24.4 %    % Monocytes 9.1 %    % Eosinophils 1.3 %    % Basophils 0.8 %    % Immature Granulocytes 0.3 %    Nucleated RBCs 0 0 /100    Absolute Neutrophil 4.9 1.6 - 8.3 10e9/L    Absolute Lymphocytes 1.9 0.8 - 5.3 10e9/L    Absolute Monocytes 0.7 0.0 - 1.3 10e9/L    Absolute Eosinophils 0.1 0.0 - 0.7 10e9/L    Absolute Basophils 0.1 0.0 - 0.2 10e9/L    Abs Immature  Granulocytes 0.0 0 - 0.4 10e9/L    Absolute Nucleated RBC 0.0    XR Abdomen 2 Views    Narrative    PROCEDURE: XR ABDOMEN 2 VW 11/1/2019 8:58 AM    HISTORY: LLQ pain    COMPARISONS: 2/2/2017.    TECHNIQUE: Supine and upright views.    FINDINGS: There is no free intraperitoneal air. Small amount of gas  and stool are seen within the colon to the level of the rectum. There  are no dilated gas-filled small bowel loops. No mass is seen and there  are no suspicious calcifications.         Impression    IMPRESSION: No bowel distention or free air.    YUE SOFIA MD   US Pelvic Complete with Transvaginal    Narrative    PROCEDURE: US PELVIC COMPLETE WITH TRANSVAGINAL 11/1/2019 10:08 AM    HISTORY: LLQ / pelvic pain    COMPARISONS: None.    TECHNIQUE: Transvaginal imaging.    FINDINGS: Uterus measures 10.2 x 5.4 x 5.8 cm. No focal uterine mass  is seen. Endometrial stripe measures 1.3 cm. There is some free fluid  in the cul-de-sac.    Right ovary appears normal. It measures 2.9 x 1.5 x 2.1 cm.    Left ovary measures 4.0 x 5.5 x 2.7 cm. The entire ovary appears  somewhat heterogeneous with questionable 3.8 x 3.0 x 2.4 cm solid  lesion within the ovary. There is some flow within the ovary.         Impression    IMPRESSION: Abnormal appearing left ovary which is asymmetrical with  the right ovary. Enlargement of the ovary raises the possibility of an  ovarian torsion but flow was easily visualized within the ovary in  there are not displaced peripheral follicles which often are seen in  ovarian torsion. Because there is a possible solid mass in the ovary  recommend short-term follow-up in approximately 6 weeks.    YUE SOFIA MD       Medications   0.9% sodium chloride BOLUS (1,000 mLs Intravenous New Bag 11/1/19 0842)     Followed by   sodium chloride 0.9% infusion (has no administration in time range)   ketorolac (TORADOL) injection 30 mg (has no administration in time range)   ondansetron (ZOFRAN) injection 4  mg (4 mg Intravenous Given 11/1/19 0845)       Assessments & Plan (with Medical Decision Making)   Ultrasound of the pelvis shows a enlarged left ovary and some fluid in the cul-de-sac.  There is a question of a solid mass in the ovary, the recommendation from radiology is that the patient have a repeat  ultrasound in 6 weeks.  Discussed this with the patient and her mother, they voiced understanding.  Spoke with Faye Chang, she will follow through on this.    I have reviewed the nursing notes.    I have reviewed the findings, diagnosis, plan and need for follow up with the patient.  New Prescriptions    IBUPROFEN (ADVIL/MOTRIN) 800 MG TABLET    Take 1 tablet (800 mg) by mouth every 8 hours as needed for moderate pain       Final diagnoses:   Ruptured ovarian cyst       11/1/2019   [facility]     Amelia Codrova MD  11/01/19 1038

## 2019-11-01 NOTE — ED AVS SNAPSHOT
[facility]  [address] [place] [id number]  Phone:  [phone]                                    Kamila Ma   MRN: [id number]    Department:  [facility]   Date of Visit:  11/1/2019           After Visit Summary Signature Page    I have received my discharge instructions, and my questions have been answered. I have discussed any challenges I see with this plan with the nurse or doctor.    ..........................................................................................................................................  Patient/Patient Representative Signature      ..........................................................................................................................................  Patient Representative Print Name and Relationship to Patient    ..................................................               ................................................  Date                                   Time    ..........................................................................................................................................  Reviewed by Signature/Title    ...................................................              ..............................................  Date                                               Time          22EPIC Rev 08/18

## 2019-11-01 NOTE — ED NOTES
PIV started, labs sent.  UA collected and sent.  IV normal saline bolus 1 liter started, and IV Zofran 4 mg given.  Pt in x-ray now.

## 2019-12-10 ENCOUNTER — E-VISIT (OUTPATIENT)
Dept: FAMILY MEDICINE | Facility: OTHER | Age: 24
End: 2019-12-10
Payer: COMMERCIAL

## 2019-12-10 DIAGNOSIS — J06.9 VIRAL URI WITH COUGH: Primary | ICD-10-CM

## 2019-12-10 PROCEDURE — 99444 ZZC PHYSICIAN ONLINE EVALUATION & MANAGEMENT SERVICE: CPT | Performed by: FAMILY MEDICINE

## 2019-12-11 NOTE — TELEPHONE ENCOUNTER
ELECTRONIC VISIT DOCUMENTATION:    SUBJECTIVE:  Note above accurately captures the substance of my conversation with the patient.    ASSESSMENT/ PLAN:  1. Viral URI with cough  Follow-up if not improving, need to examine in office.    Sol Kim MD  12/11/2019  12:04 PM

## 2019-12-19 NOTE — PROGRESS NOTES
Subjective     Kamila Ma is a 24 year old female who presents to clinic today for the following health issues:    HPI   Concern - Review ovarian scan  Onset: 1 month    Description:   Ovarian scan- here to discuss results and next steps -     Intensity: N/A    Progression of Symptoms: N/A    Accompanying Signs & Symptoms:  N/A    Previous history of similar problem:   N/A    Precipitating factors:   Worsened by: N/A    Alleviating factors:  Improved by: N/A    Therapies Tried and outcome: none    She is recently ; considering starting a family.  She does have chronic anemia, is not taking iron now.  She has in the past but takes intermittently.      Patient Active Problem List   Diagnosis     Mixed hearing loss     Acute recurrent maxillary sinusitis     ACP (advance care planning)     Other iron deficiency anemia     Bariatric surgery status     Vasovagal near syncope     Crouzon craniofacial dysostosis     Morbid obesity (H)     Pulmonary nodules     Past Surgical History:   Procedure Laterality Date     ADENOIDECTOMY       GI SURGERY      VSG      MOUTH SURGERY  8/2011    wisdom teeth extracted     PET placement       sagital suture release  6/1994     SEPTOPLASTY, TURBINOPLASTY, COMBINED N/A 6/13/2017    Procedure: COMBINED SEPTOPLASTY, TURBINOPLASTY;  SEPTOPLASTY, TURBINATE REDUCTION;  Surgeon: Gina Casillas MD;  Location: [facility]     TONSILLECTOMY         Social History     Tobacco Use     Smoking status: Never Smoker     Smokeless tobacco: Never Used   Substance Use Topics     Alcohol use: No     Family History   Problem Relation Age of Onset     Hypertension Mother      Migraines Mother      Diabetes Paternal Grandfather      Hypertension Maternal Uncle      Hypertension Maternal Grandfather      Hypertension Maternal Grandmother      Depression Sister      Unknown/Adopted Paternal Grandmother          Current Outpatient Medications   Medication Sig Dispense Refill      Calcium-Vitamin D-Vitamin K (VIACTIV PO) Take 1 tablet by mouth daily.        ferrous sulfate (IRON) 325 (65 FE) MG tablet Take 1 tablet (325 mg) by mouth 2 times daily (Patient not taking: Reported on 12/20/2019) 60 tablet 1     Pediatric Multivit-Minerals-C (FLINTSTONES COMPLETE PO) Take 1 tablet by mouth daily.        Allergies   Allergen Reactions     Hydrocodone-Acetaminophen Nausea and Vomiting     Morphine Nausea and Vomiting     Sulfa Drugs Hives     SULFA (SULFONAMIDE ANTIBIOTICS)     Topamax Diarrhea     Topiramate Diarrhea     Recent Labs   Lab Test 11/01/19  0836 01/25/19  0955 01/13/19  2131 02/02/17  1136  07/18/16  0829   ALT 22  --  28 19   < > 15   CR 0.63  --  0.68 0.74   < > 0.76   GFRESTIMATED >90  --  >90 >90  Non African American GFR Calc     < > >90  Non  GFR Calc     GFRESTBLACK >90  --  >90 >90  African American GFR Calc     < > >90   GFR Calc     POTASSIUM 4.0  --  3.3* 4.2   < > 4.0   TSH  --  1.27  --   --   --  1.78    < > = values in this interval not displayed.      BP Readings from Last 3 Encounters:   12/20/19 114/72   11/01/19 106/88   09/05/19 110/70    Wt Readings from Last 3 Encounters:   12/20/19 125.9 kg (277 lb 9.6 oz)   09/05/19 116.6 kg (257 lb)   07/31/19 115.2 kg (254 lb)               Reviewed and updated as needed this visit by Provider         Review of Systems   ROS COMP: Constitutional, HEENT, cardiovascular, pulmonary, gi and gu systems are negative, except as otherwise noted.      Objective    /72 (BP Location: Left arm, Patient Position: Sitting, Cuff Size: Adult Large)   Pulse 85   Temp 99.7  F (37.6  C) (Tympanic)   Wt 125.9 kg (277 lb 9.6 oz)   SpO2 98%   BMI 49.17 kg/m    Body mass index is 49.17 kg/m .  Physical Exam   GENERAL: healthy, alert and no distress  RESP: lungs clear to auscultation - no rales, rhonchi or wheezes  CV: regular rate and rhythm, normal S1 S2, no S3 or S4, no murmur, click or rub, no peripheral  "edema and peripheral pulses strong  MS: no gross musculoskeletal defects noted, no edema  PSYCH: mentation appears normal, affect normal/bright    PROCEDURE: US PELVIC COMPLETE WITH TRANSVAGINAL 11/1/2019 10:08 AM     HISTORY: LLQ / pelvic pain     COMPARISONS: None.     TECHNIQUE: Transvaginal imaging.     FINDINGS: Uterus measures 10.2 x 5.4 x 5.8 cm. No focal uterine mass  is seen. Endometrial stripe measures 1.3 cm. There is some free fluid  in the cul-de-sac.     Right ovary appears normal. It measures 2.9 x 1.5 x 2.1 cm.     Left ovary measures 4.0 x 5.5 x 2.7 cm. The entire ovary appears  somewhat heterogeneous with questionable 3.8 x 3.0 x 2.4 cm solid  lesion within the ovary. There is some flow within the ovary.                                                                        IMPRESSION: Abnormal appearing left ovary which is asymmetrical with  the right ovary. Enlargement of the ovary raises the possibility of an  ovarian torsion but flow was easily visualized within the ovary in  there are not displaced peripheral follicles which often are seen in  ovarian torsion. Because there is a possible solid mass in the ovary  recommend short-term follow-up in approximately 6 weeks.     YUE SOFIA MD      Assessment & Plan     1. Abnormal ultrasound of pelvis  Due to repeat Ultrasound, it is ordered today.    - US Pelvic Complete with Transvaginal; Future    2. Mass of left ovary  - US Pelvic Complete with Transvaginal; Future    3. Other iron deficiency anemia  Restart iron supplement  Start prenatal vitamin  Start folic acid.         BMI:   Estimated body mass index is 49.17 kg/m  as calculated from the following:    Height as of 9/5/19: 1.6 m (5' 3\").    Weight as of this encounter: 125.9 kg (277 lb 9.6 oz).   Weight management plan: Discussed healthy diet and exercise guidelines          Return if symptoms worsen or fail to improve.    Saadia Cross, NP  [facility] [place] " [facility]

## 2019-12-20 ENCOUNTER — OFFICE VISIT (OUTPATIENT)
Dept: FAMILY MEDICINE | Facility: OTHER | Age: 24
End: 2019-12-20
Attending: NURSE PRACTITIONER
Payer: COMMERCIAL

## 2019-12-20 VITALS
WEIGHT: 277.6 LBS | TEMPERATURE: 99.7 F | OXYGEN SATURATION: 98 % | SYSTOLIC BLOOD PRESSURE: 114 MMHG | HEART RATE: 85 BPM | BODY MASS INDEX: 49.17 KG/M2 | DIASTOLIC BLOOD PRESSURE: 72 MMHG

## 2019-12-20 DIAGNOSIS — R93.89 ABNORMAL ULTRASOUND OF PELVIS: Primary | ICD-10-CM

## 2019-12-20 DIAGNOSIS — N83.8 MASS OF LEFT OVARY: ICD-10-CM

## 2019-12-20 DIAGNOSIS — D50.8 OTHER IRON DEFICIENCY ANEMIA: ICD-10-CM

## 2019-12-20 PROCEDURE — 99214 OFFICE O/P EST MOD 30 MIN: CPT | Performed by: NURSE PRACTITIONER

## 2019-12-20 ASSESSMENT — ANXIETY QUESTIONNAIRES
2. NOT BEING ABLE TO STOP OR CONTROL WORRYING: NOT AT ALL
5. BEING SO RESTLESS THAT IT IS HARD TO SIT STILL: NOT AT ALL
IF YOU CHECKED OFF ANY PROBLEMS ON THIS QUESTIONNAIRE, HOW DIFFICULT HAVE THESE PROBLEMS MADE IT FOR YOU TO DO YOUR WORK, TAKE CARE OF THINGS AT HOME, OR GET ALONG WITH OTHER PEOPLE: NOT DIFFICULT AT ALL
3. WORRYING TOO MUCH ABOUT DIFFERENT THINGS: NOT AT ALL
4. TROUBLE RELAXING: NOT AT ALL
1. FEELING NERVOUS, ANXIOUS, OR ON EDGE: NOT AT ALL
6. BECOMING EASILY ANNOYED OR IRRITABLE: NOT AT ALL
7. FEELING AFRAID AS IF SOMETHING AWFUL MIGHT HAPPEN: NOT AT ALL
GAD7 TOTAL SCORE: 0

## 2019-12-20 ASSESSMENT — PAIN SCALES - GENERAL: PAINLEVEL: NO PAIN (0)

## 2019-12-20 ASSESSMENT — PATIENT HEALTH QUESTIONNAIRE - PHQ9: SUM OF ALL RESPONSES TO PHQ QUESTIONS 1-9: 0

## 2019-12-20 NOTE — PATIENT INSTRUCTIONS
"        Assessment & Plan     1. Abnormal ultrasound of pelvis  - US Pelvic Complete with Transvaginal; Future    2. Mass of left ovary  - US Pelvic Complete with Transvaginal; Future    3. Other iron deficiency anemia  Restart iron supplement  Start prenatal vitamin  Start folic acid.         BMI:   Estimated body mass index is 49.17 kg/m  as calculated from the following:    Height as of 9/5/19: 1.6 m (5' 3\").    Weight as of this encounter: 125.9 kg (277 lb 9.6 oz).             Return if symptoms worsen or fail to improve.    Saadia Cross NP  [facility] [place] [facility]      "

## 2019-12-20 NOTE — NURSING NOTE
"Chief Complaint   Patient presents with     RECHECK     Ovarian Scan       Initial /72 (BP Location: Left arm, Patient Position: Sitting, Cuff Size: Adult Large)   Pulse 85   Temp 99.7  F (37.6  C) (Tympanic)   Wt 125.9 kg (277 lb 9.6 oz)   SpO2 98%   BMI 49.17 kg/m   Estimated body mass index is 49.17 kg/m  as calculated from the following:    Height as of 9/5/19: 1.6 m (5' 3\").    Weight as of this encounter: 125.9 kg (277 lb 9.6 oz).  Medication Reconciliation: complete  Ashley A. Lechevalier, LPN  "

## 2019-12-21 ASSESSMENT — ANXIETY QUESTIONNAIRES: GAD7 TOTAL SCORE: 0

## 2019-12-27 ENCOUNTER — TELEPHONE (OUTPATIENT)
Dept: FAMILY MEDICINE | Facility: OTHER | Age: 24
End: 2019-12-27

## 2020-01-06 ENCOUNTER — TELEPHONE (OUTPATIENT)
Dept: FAMILY MEDICINE | Facility: OTHER | Age: 25
End: 2020-01-06

## 2020-01-06 NOTE — TELEPHONE ENCOUNTER
2:17 PM    Reason for Call: OVERBOOK    Patient is having the following symptoms: Pregnancy Test/TTC Inquiry/mychart request    The patient is requesting an appointment for 01/10 Friday afternoon with Paras Shahid.    Was an appointment offered for this call? No  If yes : Appointment type              Date    Preferred method for responding to this message: Telephone Call  What is your phone number ?[phone]    If we cannot reach you directly, may we leave a detailed response at the number you provided? Yes    Can this message wait until your PCP/provider returns, if unavailable today? Not applicable,     Barbara Nguyen

## 2020-01-13 ENCOUNTER — OFFICE VISIT (OUTPATIENT)
Dept: FAMILY MEDICINE | Facility: OTHER | Age: 25
End: 2020-01-13
Attending: NURSE PRACTITIONER
Payer: COMMERCIAL

## 2020-01-13 VITALS
HEIGHT: 63 IN | TEMPERATURE: 99.1 F | DIASTOLIC BLOOD PRESSURE: 66 MMHG | BODY MASS INDEX: 49.26 KG/M2 | HEART RATE: 76 BPM | WEIGHT: 278 LBS | OXYGEN SATURATION: 99 % | RESPIRATION RATE: 18 BRPM | SYSTOLIC BLOOD PRESSURE: 106 MMHG

## 2020-01-13 DIAGNOSIS — J06.9 VIRAL URI WITH COUGH: ICD-10-CM

## 2020-01-13 DIAGNOSIS — R35.0 URINARY FREQUENCY: Primary | ICD-10-CM

## 2020-01-13 DIAGNOSIS — R82.79 ABNORMAL FINDINGS ON MICROBIOLOGICAL EXAMINATION OF URINE: ICD-10-CM

## 2020-01-13 DIAGNOSIS — N30.00 ACUTE CYSTITIS WITHOUT HEMATURIA: ICD-10-CM

## 2020-01-13 LAB
ALBUMIN UR-MCNC: NEGATIVE MG/DL
APPEARANCE UR: ABNORMAL
BACTERIA #/AREA URNS HPF: ABNORMAL /HPF
BILIRUB UR QL STRIP: NEGATIVE
COLOR UR AUTO: YELLOW
GLUCOSE UR STRIP-MCNC: NEGATIVE MG/DL
HGB UR QL STRIP: ABNORMAL
KETONES UR STRIP-MCNC: NEGATIVE MG/DL
LEUKOCYTE ESTERASE UR QL STRIP: ABNORMAL
NITRATE UR QL: POSITIVE
NON-SQ EPI CELLS #/AREA URNS LPF: ABNORMAL /LPF
PH UR STRIP: 6 PH (ref 5–7)
RBC #/AREA URNS AUTO: ABNORMAL /HPF
SOURCE: ABNORMAL
SP GR UR STRIP: 1.02 (ref 1–1.03)
UROBILINOGEN UR STRIP-ACNC: 0.2 EU/DL (ref 0.2–1)
WBC #/AREA URNS AUTO: ABNORMAL /HPF

## 2020-01-13 PROCEDURE — 87186 SC STD MICRODIL/AGAR DIL: CPT | Performed by: NURSE PRACTITIONER

## 2020-01-13 PROCEDURE — 99214 OFFICE O/P EST MOD 30 MIN: CPT | Performed by: NURSE PRACTITIONER

## 2020-01-13 PROCEDURE — 87086 URINE CULTURE/COLONY COUNT: CPT | Performed by: NURSE PRACTITIONER

## 2020-01-13 PROCEDURE — 81001 URINALYSIS AUTO W/SCOPE: CPT | Performed by: NURSE PRACTITIONER

## 2020-01-13 PROCEDURE — 87088 URINE BACTERIA CULTURE: CPT | Performed by: NURSE PRACTITIONER

## 2020-01-13 RX ORDER — CIPROFLOXACIN 500 MG/1
500 TABLET, FILM COATED ORAL 2 TIMES DAILY
Qty: 20 TABLET | Refills: 0 | Status: SHIPPED | OUTPATIENT
Start: 2020-01-13 | End: 2020-02-17

## 2020-01-13 ASSESSMENT — PAIN SCALES - GENERAL: PAINLEVEL: NO PAIN (0)

## 2020-01-13 ASSESSMENT — MIFFLIN-ST. JEOR: SCORE: 1975.13

## 2020-01-13 NOTE — PROGRESS NOTES
Subjective     Kamila Ma is a 24 year old female who presents to clinic today for the following health issues:    HPI   Acute Illness   Acute illness concerns: cough sinus pressure   Onset: over a week     Fever: no     Chills/Sweats: no     Headache (location?): YES    Sinus Pressure:YES    Conjunctivitis:  no    Ear Pain: no    Rhinorrhea: YES    Congestion: YES    Sore Throat: YES     Cough: YES-productive of green sputum    Wheeze: YES    Decreased Appetite: no     Nausea: no     Vomiting: no     Diarrhea:  no     Dysuria/Freq.: YES    Fatigue/Achiness: YES    Sick/Strep Exposure: YES     Therapies Tried and outcome: sinus rinse cough syrup humidifier    URINARY TRACT SYMPTOMS  Onset: Saturday     Description:   Painful urination (Dysuria): YES           Frequency: YES  Blood in urine (Hematuria): no   Delay in urine (Hesitency): YES    Intensity: mild    Progression of Symptoms:  worsening and same    Accompanying Signs & Symptoms:  Fever/chills: no   Flank pain YES  Nausea and vomiting: YES- vomited once   Any vaginal symptoms: none  Abdominal/Pelvic Pain: no     History:   History of frequent UTI's: YES  History of kidney stones: no   Sexually Active: YES  Possibility of pregnancy: No    Precipitating factors:   Unknown     Therapies Tried and outcome: Increase fluid intake      Patient Active Problem List   Diagnosis     Mixed hearing loss     Acute recurrent maxillary sinusitis     ACP (advance care planning)     Other iron deficiency anemia     Bariatric surgery status     Vasovagal near syncope     Crouzon craniofacial dysostosis     Morbid obesity (H)     Pulmonary nodules     Past Surgical History:   Procedure Laterality Date     ADENOIDECTOMY       GI SURGERY      VSG      MOUTH SURGERY  8/2011    wisdom teeth extracted     PET placement       sagital suture release  6/1994     SEPTOPLASTY, TURBINOPLASTY, COMBINED N/A 6/13/2017    Procedure: COMBINED SEPTOPLASTY, TURBINOPLASTY;   SEPTOPLASTY, TURBINATE REDUCTION;  Surgeon: Gina Casillas MD;  Location: [facility]     TONSILLECTOMY         Social History     Tobacco Use     Smoking status: Never Smoker     Smokeless tobacco: Never Used   Substance Use Topics     Alcohol use: No     Family History   Problem Relation Age of Onset     Hypertension Mother      Migraines Mother      Diabetes Paternal Grandfather      Hypertension Maternal Uncle      Hypertension Maternal Grandfather      Hypertension Maternal Grandmother      Depression Sister      Unknown/Adopted Paternal Grandmother          Current Outpatient Medications   Medication Sig Dispense Refill     ciprofloxacin (CIPRO) 500 MG tablet Take 1 tablet (500 mg) by mouth 2 times daily for 10 days 20 tablet 0     Pediatric Multivit-Minerals-C (FLINTSTONES COMPLETE PO) Take 1 tablet by mouth daily.        Prenatal Multivit-Min-Fe-FA (PRE- PO)        Calcium-Vitamin D-Vitamin K (VIACTIV PO) Take 1 tablet by mouth daily.        ferrous sulfate (IRON) 325 (65 FE) MG tablet Take 1 tablet (325 mg) by mouth 2 times daily (Patient not taking: Reported on 2019) 60 tablet 1     Allergies   Allergen Reactions     Hydrocodone-Acetaminophen Nausea and Vomiting     Morphine Nausea and Vomiting     Sulfa Drugs Hives     SULFA (SULFONAMIDE ANTIBIOTICS)     Topamax Diarrhea     Topiramate Diarrhea     Recent Labs   Lab Test 19  0836 19  0955 19  2131 17  1136  16  0829   ALT 22  --  28 19   < > 15   CR 0.63  --  0.68 0.74   < > 0.76   GFRESTIMATED >90  --  >90 >90  Non African American GFR Calc     < > >90  Non  GFR Calc     GFRESTBLACK >90  --  >90 >90  African American GFR Calc     < > >90   GFR Calc     POTASSIUM 4.0  --  3.3* 4.2   < > 4.0   TSH  --  1.27  --   --   --  1.78    < > = values in this interval not displayed.      BP Readings from Last 3 Encounters:   20 106/66   19 114/72   19 106/88    Wt Readings  "from Last 3 Encounters:   01/13/20 126.1 kg (278 lb)   12/20/19 125.9 kg (277 lb 9.6 oz)   09/05/19 116.6 kg (257 lb)               Reviewed and updated as needed this visit by Provider  Allergies         Review of Systems   ROS COMP: Constitutional, HEENT, cardiovascular, pulmonary, gi and gu systems are negative, except as otherwise noted.      Objective    /66 (BP Location: Left arm, Patient Position: Chair, Cuff Size: Adult Large)   Pulse 76   Temp 99.1  F (37.3  C) (Tympanic)   Resp 18   Ht 1.6 m (5' 3\")   Wt 126.1 kg (278 lb)   LMP 01/06/2020 (Approximate)   SpO2 99%   BMI 49.25 kg/m    Body mass index is 49.25 kg/m .  Physical Exam   GENERAL: healthy, alert and no distress  HENT: normal cephalic/atraumatic, both ears: dull, nose and mouth without ulcers or lesions, nasal mucosa edematous , rhinorrhea clear and oral mucous membranes moist, throat mildly erythematous without exudate  NECK: no adenopathy, no asymmetry, masses, or scars and thyroid normal to palpation  RESP: lungs clear to auscultation - no rales, rhonchi or wheezes  CV: regular rate and rhythm, normal S1 S2, no S3 or S4, no murmur, click or rub, no peripheral edema and peripheral pulses strong  MS: no gross musculoskeletal defects noted, no edema  BACK: no CVA tenderness  PSYCH: mentation appears normal, affect normal/bright          Results for orders placed or performed in visit on 01/13/20   UA with Microscopic reflex to Culture [facility]     Status: Abnormal   Result Value Ref Range    Color Urine Yellow     Appearance Urine Cloudy     Glucose Urine Negative NEG^Negative mg/dL    Bilirubin Urine Negative NEG^Negative    Ketones Urine Negative NEG^Negative mg/dL    Specific Gravity Urine 1.025 1.003 - 1.035    pH Urine 6.0 5.0 - 7.0 pH    Protein Albumin Urine Negative NEG^Negative mg/dL    Urobilinogen Urine 0.2 0.2 - 1.0 EU/dL    Nitrite Urine Positive (A) NEG^Negative    Blood Urine Small (A) NEG^Negative    " Leukocyte Esterase Urine Small (A) NEG^Negative    Source Midstream Urine     WBC Urine 25-50 (A) OTO5^0 - 5 /HPF    RBC Urine O - 2 OTO2^O - 2 /HPF    Squamous Epithelial /LPF Urine Few FEW^Few /LPF    Bacteria Urine Many (A) NEG^Negative /HPF         Assessment & Plan     1. Urinary frequency  - UA with Microscopic reflex to Culture - [facility]    2. Abnormal findings on microbiological examination of urine  - Urine Culture Aerobic Bacterial    3. Acute cystitis without hematuria  - ciprofloxacin (CIPRO) 500 MG tablet; Take 1 tablet (500 mg) by mouth 2 times daily for 10 days  Dispense: 20 tablet; Refill: 0    4. Viral URI with cough  Symptomatic cares           Return if symptoms worsen or fail to improve.    Saadia Cross NP  [facility]

## 2020-01-13 NOTE — PATIENT INSTRUCTIONS
Assessment & Plan     1. Urinary frequency  - [facility]    2. Abnormal findings on microbiological examination of urine  - Urine Culture Aerobic Bacterial    3. Acute cystitis without hematuria  - ciprofloxacin (CIPRO) 500 MG tablet; Take 1 tablet (500 mg) by mouth 2 times daily for 10 days  Dispense: 20 tablet; Refill: 0    4. Viral URI with cough  Symptomatic cares           Return if symptoms worsen or fail to improve.    Saadia Cross NP  [facility]

## 2020-01-13 NOTE — NURSING NOTE
"Chief Complaint   Patient presents with     Sinus Problem     UTI       Initial /66 (BP Location: Left arm, Patient Position: Chair, Cuff Size: Adult Large)   Pulse 76   Temp 99.1  F (37.3  C) (Tympanic)   Resp 18   Ht 1.6 m (5' 3\")   Wt 126.1 kg (278 lb)   LMP 01/06/2020 (Approximate)   SpO2 99%   BMI 49.25 kg/m   Estimated body mass index is 49.25 kg/m  as calculated from the following:    Height as of this encounter: 1.6 m (5' 3\").    Weight as of this encounter: 126.1 kg (278 lb).  Medication Reconciliation: complete  Pamela M. Lechevalier, LPN    "

## 2020-01-14 NOTE — PROGRESS NOTES
Subjective     Kamila Ma is a 24 year old female who presents to clinic today for the following health issues:    HPI : Follows with Saadia Cross with last visit 1/13/2020  ED/UC Followup:    Facility:  [facility]  Date of visit: 11/01/2019  Reason for visit: ?solid mass in left ovary  Current Status: feeling good  - Recommendation for repeat US in 6 wks  - : normal at 7  - Visit with PCP on 12/20/2019 and order for US was placed, but could not get a hold of Kamila to schedule  - dx w/ UTI on 1/13/2020 and give cipro  - contraception none  - Frequent UTI (8/20/2018, 8/24/2018, 9/5/2019, 1/13/2020)  - UTI symptoms have resolved  - h/o irregular periods  - h/o menstural pain only on left side       Patient Active Problem List   Diagnosis     Mixed hearing loss     Acute recurrent maxillary sinusitis     ACP (advance care planning)     Other iron deficiency anemia     Bariatric surgery status     Vasovagal near syncope     Crouzon craniofacial dysostosis     Morbid obesity (H)     Pulmonary nodules     Abdominal pannus     Dermatitis fungal     GERD (gastroesophageal reflux disease)     Hearing loss     Vitamin D deficiency     Past Surgical History:   Procedure Laterality Date     ADENOIDECTOMY       GI SURGERY      VSG      MOUTH SURGERY  8/2011    wisdom teeth extracted     PET placement       sagital suture release  6/1994     SEPTOPLASTY, TURBINOPLASTY, COMBINED N/A 6/13/2017    Procedure: COMBINED SEPTOPLASTY, TURBINOPLASTY;  SEPTOPLASTY, TURBINATE REDUCTION;  Surgeon: Gina Casillas MD;  Location: [facility]     TONSILLECTOMY         Social History     Tobacco Use     Smoking status: Never Smoker     Smokeless tobacco: Never Used   Substance Use Topics     Alcohol use: No     Family History   Problem Relation Age of Onset     Hypertension Mother      Migraines Mother      Diabetes Paternal Grandfather      Hypertension Maternal Uncle      Hypertension Maternal Grandfather       Hypertension Maternal Grandmother      Depression Sister      Unknown/Adopted Paternal Grandmother          Current Outpatient Medications   Medication Sig Dispense Refill     Calcium-Vitamin D-Vitamin K (VIACTIV PO) Take 1 tablet by mouth daily.        ciprofloxacin (CIPRO) 500 MG tablet Take 1 tablet (500 mg) by mouth 2 times daily for 10 days 20 tablet 0     Prenatal Multivit-Min-Fe-FA (PRE-SAV PO)        ferrous sulfate (IRON) 325 (65 FE) MG tablet Take 1 tablet (325 mg) by mouth 2 times daily (Patient not taking: Reported on 2020) 60 tablet 1     Allergies   Allergen Reactions     Hydrocodone-Acetaminophen Nausea and Vomiting     Morphine Nausea and Vomiting     Sulfa Drugs Hives     SULFA (SULFONAMIDE ANTIBIOTICS)     Topamax Diarrhea     Topiramate Diarrhea     BP Readings from Last 3 Encounters:   20 104/68   20 106/66   19 114/72    Wt Readings from Last 3 Encounters:   20 126.1 kg (278 lb)   20 126.1 kg (278 lb)   19 125.9 kg (277 lb 9.6 oz)            Reviewed and updated as needed this visit by Provider  Tobacco  Allergies  Meds  Problems  Med Hx  Surg Hx  Fam Hx         Review of Systems   Constitutional: Negative for chills and fever.   HENT: Positive for congestion.    Respiratory: Negative for shortness of breath.    Cardiovascular: Negative for chest pain and palpitations.   Gastrointestinal: Negative for abdominal pain.   Genitourinary: Negative for dysuria, menstrual problem (h/o irregular), pelvic pain, vaginal bleeding, vaginal discharge and vaginal pain.            Objective    LMP 2020 (Approximate)   There is no height or weight on file to calculate BMI.  Physical Exam  Constitutional:       General: She is not in acute distress.     Appearance: She is obese. She is not ill-appearing.   Cardiovascular:      Rate and Rhythm: Normal rate and regular rhythm.      Pulses: Normal pulses.      Heart sounds: No murmur.   Pulmonary:      Effort:  Pulmonary effort is normal. No respiratory distress.      Breath sounds: No wheezing or rales.   Abdominal:      General: Bowel sounds are normal. There is no distension.      Tenderness: There is no abdominal tenderness. There is no guarding.   Neurological:      Mental Status: She is alert.        Diagnostic Test Results:       US pelvic w/ transvaginal (11/1/2019)  IMPRESSION: Abnormal appearing left ovary which is asymmetrical with the right ovary. Enlargement of the ovary raises the possibility of an ovarian torsion but flow was easily visualized within the ovary in there are not displaced peripheral follicles which often are seen in ovarian torsion. Because there is a possible solid mass in the ovary recommend short-term follow-up in approximately 6 weeks.    Assessment & Plan     1. Abnormal ultrasound of pelvis / Mass of left ovary  Will repeat imaging. Low threshold for Ob/gyn referral   - US Pelvic Complete with Transvaginal; Future      See Patient Instructions    Return in about 3 months (around 4/20/2020), or if symptoms worsen or fail to improve.    Esperanza Thurston MD  [facility] [place]

## 2020-01-15 LAB
BACTERIA SPEC CULT: ABNORMAL
SPECIMEN SOURCE: ABNORMAL

## 2020-01-20 ENCOUNTER — OFFICE VISIT (OUTPATIENT)
Dept: FAMILY MEDICINE | Facility: OTHER | Age: 25
End: 2020-01-20
Attending: FAMILY MEDICINE
Payer: COMMERCIAL

## 2020-01-20 VITALS
HEART RATE: 70 BPM | BODY MASS INDEX: 49.26 KG/M2 | SYSTOLIC BLOOD PRESSURE: 104 MMHG | TEMPERATURE: 99.2 F | DIASTOLIC BLOOD PRESSURE: 68 MMHG | HEIGHT: 63 IN | WEIGHT: 278 LBS | OXYGEN SATURATION: 99 %

## 2020-01-20 DIAGNOSIS — N83.8 MASS OF LEFT OVARY: ICD-10-CM

## 2020-01-20 DIAGNOSIS — R93.89 ABNORMAL ULTRASOUND OF PELVIS: Primary | ICD-10-CM

## 2020-01-20 PROCEDURE — 99213 OFFICE O/P EST LOW 20 MIN: CPT | Performed by: FAMILY MEDICINE

## 2020-01-20 ASSESSMENT — ENCOUNTER SYMPTOMS
CHILLS: 0
ABDOMINAL PAIN: 0
DYSURIA: 0
SHORTNESS OF BREATH: 0
FEVER: 0
PALPITATIONS: 0

## 2020-01-20 ASSESSMENT — MIFFLIN-ST. JEOR: SCORE: 1975.13

## 2020-01-20 ASSESSMENT — PAIN SCALES - GENERAL: PAINLEVEL: NO PAIN (0)

## 2020-01-20 NOTE — NURSING NOTE
"Chief Complaint   Patient presents with     Hospital F/U       Initial /68 (BP Location: Left arm, Patient Position: Sitting, Cuff Size: Adult Regular)   Pulse 70   Temp 99.2  F (37.3  C) (Tympanic)   Ht 1.6 m (5' 3\")   Wt 126.1 kg (278 lb)   LMP 01/06/2020 (Approximate)   SpO2 99%   BMI 49.25 kg/m   Estimated body mass index is 49.25 kg/m  as calculated from the following:    Height as of this encounter: 1.6 m (5' 3\").    Weight as of this encounter: 126.1 kg (278 lb).  Medication Reconciliation: complete  Sugar Joshi LPN  "

## 2020-01-24 ENCOUNTER — HOSPITAL ENCOUNTER (OUTPATIENT)
Dept: ULTRASOUND IMAGING | Facility: HOSPITAL | Age: 25
Discharge: HOME | End: 2020-01-24
Attending: FAMILY MEDICINE | Admitting: FAMILY MEDICINE
Payer: COMMERCIAL

## 2020-01-24 DIAGNOSIS — R93.89 ABNORMAL ULTRASOUND OF PELVIS: ICD-10-CM

## 2020-01-24 DIAGNOSIS — N83.8 MASS OF LEFT OVARY: ICD-10-CM

## 2020-01-24 PROCEDURE — 76830 TRANSVAGINAL US NON-OB: CPT | Mod: TC

## 2020-01-27 DIAGNOSIS — N83.8 MASS OF LEFT OVARY: Primary | ICD-10-CM

## 2020-02-06 ENCOUNTER — TELEPHONE (OUTPATIENT)
Dept: FAMILY MEDICINE | Facility: OTHER | Age: 25
End: 2020-02-06

## 2020-02-06 ENCOUNTER — HOSPITAL ENCOUNTER (EMERGENCY)
Facility: HOSPITAL | Age: 25
Discharge: HOME | End: 2020-02-06
Attending: PHYSICIAN ASSISTANT | Admitting: PHYSICIAN ASSISTANT
Payer: COMMERCIAL

## 2020-02-06 VITALS
OXYGEN SATURATION: 100 % | SYSTOLIC BLOOD PRESSURE: 136 MMHG | TEMPERATURE: 99.3 F | DIASTOLIC BLOOD PRESSURE: 87 MMHG | RESPIRATION RATE: 18 BRPM

## 2020-02-06 DIAGNOSIS — N93.9 EXCESSIVE VAGINAL BLEEDING: ICD-10-CM

## 2020-02-06 LAB
ABO + RH BLD: NORMAL
ABO + RH BLD: NORMAL
ALBUMIN SERPL-MCNC: 3.4 G/DL (ref 3.4–5)
ALP SERPL-CCNC: 75 U/L (ref 40–150)
ALT SERPL W P-5'-P-CCNC: 25 U/L (ref 0–50)
ANION GAP SERPL CALCULATED.3IONS-SCNC: 7 MMOL/L (ref 3–14)
AST SERPL W P-5'-P-CCNC: 12 U/L (ref 0–45)
BASOPHILS # BLD AUTO: 0.1 10E9/L (ref 0–0.2)
BASOPHILS NFR BLD AUTO: 0.7 %
BILIRUB SERPL-MCNC: 0.2 MG/DL (ref 0.2–1.3)
BLD GP AB SCN SERPL QL: NORMAL
BLOOD BANK CMNT PATIENT-IMP: NORMAL
BUN SERPL-MCNC: 12 MG/DL (ref 7–30)
CALCIUM SERPL-MCNC: 8.7 MG/DL (ref 8.5–10.1)
CHLORIDE SERPL-SCNC: 109 MMOL/L (ref 94–109)
CO2 SERPL-SCNC: 23 MMOL/L (ref 20–32)
CREAT SERPL-MCNC: 0.58 MG/DL (ref 0.52–1.04)
DIFFERENTIAL METHOD BLD: ABNORMAL
EOSINOPHIL # BLD AUTO: 0.1 10E9/L (ref 0–0.7)
EOSINOPHIL NFR BLD AUTO: 1.6 %
ERYTHROCYTE [DISTWIDTH] IN BLOOD BY AUTOMATED COUNT: 17.6 % (ref 10–15)
GFR SERPL CREATININE-BSD FRML MDRD: >90 ML/MIN/{1.73_M2}
GLUCOSE SERPL-MCNC: 71 MG/DL (ref 70–99)
HCG SERPL QL: NEGATIVE
HCT VFR BLD AUTO: 31.4 % (ref 35–47)
HGB BLD-MCNC: 9.1 G/DL (ref 11.7–15.7)
IMM GRANULOCYTES # BLD: 0 10E9/L (ref 0–0.4)
IMM GRANULOCYTES NFR BLD: 0.3 %
LYMPHOCYTES # BLD AUTO: 2.1 10E9/L (ref 0.8–5.3)
LYMPHOCYTES NFR BLD AUTO: 30.1 %
MCH RBC QN AUTO: 20.1 PG (ref 26.5–33)
MCHC RBC AUTO-ENTMCNC: 29 G/DL (ref 31.5–36.5)
MCV RBC AUTO: 70 FL (ref 78–100)
MONOCYTES # BLD AUTO: 0.6 10E9/L (ref 0–1.3)
MONOCYTES NFR BLD AUTO: 9.3 %
NEUTROPHILS # BLD AUTO: 4 10E9/L (ref 1.6–8.3)
NEUTROPHILS NFR BLD AUTO: 58 %
NRBC # BLD AUTO: 0 10*3/UL
NRBC BLD AUTO-RTO: 0 /100
PLATELET # BLD AUTO: 414 10E9/L (ref 150–450)
POTASSIUM SERPL-SCNC: 3.8 MMOL/L (ref 3.4–5.3)
PROT SERPL-MCNC: 7.1 G/DL (ref 6.8–8.8)
RBC # BLD AUTO: 4.52 10E12/L (ref 3.8–5.2)
SODIUM SERPL-SCNC: 139 MMOL/L (ref 133–144)
SPECIMEN EXP DATE BLD: NORMAL
WBC # BLD AUTO: 6.9 10E9/L (ref 4–11)

## 2020-02-06 PROCEDURE — 36415 COLL VENOUS BLD VENIPUNCTURE: CPT | Performed by: PHYSICIAN ASSISTANT

## 2020-02-06 PROCEDURE — 86901 BLOOD TYPING SEROLOGIC RH(D): CPT | Performed by: PHYSICIAN ASSISTANT

## 2020-02-06 PROCEDURE — 86900 BLOOD TYPING SEROLOGIC ABO: CPT | Performed by: PHYSICIAN ASSISTANT

## 2020-02-06 PROCEDURE — 85025 COMPLETE CBC W/AUTO DIFF WBC: CPT | Performed by: PHYSICIAN ASSISTANT

## 2020-02-06 PROCEDURE — 80053 COMPREHEN METABOLIC PANEL: CPT | Performed by: PHYSICIAN ASSISTANT

## 2020-02-06 PROCEDURE — 99283 EMERGENCY DEPT VISIT LOW MDM: CPT

## 2020-02-06 PROCEDURE — 99284 EMERGENCY DEPT VISIT MOD MDM: CPT | Mod: Z6 | Performed by: PHYSICIAN ASSISTANT

## 2020-02-06 PROCEDURE — 84703 CHORIONIC GONADOTROPIN ASSAY: CPT | Performed by: PHYSICIAN ASSISTANT

## 2020-02-06 PROCEDURE — 86850 RBC ANTIBODY SCREEN: CPT | Performed by: PHYSICIAN ASSISTANT

## 2020-02-06 RX ORDER — NORETHINDRONE ACETATE 5 MG
5 TABLET ORAL 3 TIMES DAILY
Qty: 30 TABLET | Refills: 0 | Status: SHIPPED | OUTPATIENT
Start: 2020-02-06 | End: 2020-02-17

## 2020-02-06 ASSESSMENT — ENCOUNTER SYMPTOMS
DIZZINESS: 1
CHILLS: 0
FEVER: 0
BRUISES/BLEEDS EASILY: 0
ACTIVITY CHANGE: 0
APPETITE CHANGE: 0
ABDOMINAL PAIN: 0

## 2020-02-06 NOTE — DISCHARGE INSTRUCTIONS
Start norethindrone as prescribed.    Follow-up with primary care and gynecology.    Keep appointment for MRI of the pelvis.    Return here for any worsening bleeding, new symptoms, or other concerns.

## 2020-02-06 NOTE — ED NOTES
Discharge instructions reviewed with patient.  Rx has been e-scribed to pharmacy of choice. Encouraged to return with new or worsening symptoms. Pt declined discharge vitals. Copy of AVS in hand.

## 2020-02-06 NOTE — ED NOTES
Patient to ED with reports of vaginal bleeding with large clots.  Pt reports 2 pads an hour but home pregnancy tests have been negative.  Pt steady of feet and is usually pale.

## 2020-02-06 NOTE — ED AVS SNAPSHOT
[facility]  [address] [address]  Phone:  [phone]                                    Kamila Ma   MRN: [id number]    Department:  [facility]   Date of Visit:  2/6/2020           After Visit Summary Signature Page    I have received my discharge instructions, and my questions have been answered. I have discussed any challenges I see with this plan with the nurse or doctor.    ..........................................................................................................................................  Patient/Patient Representative Signature      ..........................................................................................................................................  Patient Representative Print Name and Relationship to Patient    ..................................................               ................................................  Date                                   Time    ..........................................................................................................................................  Reviewed by Signature/Title    ...................................................              ..............................................  Date                                               Time          22EPIC Rev 08/18

## 2020-02-06 NOTE — ED PROVIDER NOTES
History     Chief Complaint   Patient presents with     Vaginal Bleeding     baseball size clots x 2 days. Going through 2 pads an hour today. pt thought she was pregnant but the home pregnancy tests have been negative.      Dizziness     The history is provided by the patient.     Kamila Ma is a 24 year old female who presented to the emergency department ambulatory for evaluation of vaginal bleeding and dizziness.  The patient reports that for the last 2 days she has been having excessive vaginal bleeding with clots.  She does have a history of morbid obesity and iron deficiency anemia including dysfunctional uterine bleeding.  She is attempting to get pregnant.  She takes no birth control.  Home pregnancy test have been negative.  Denies any pelvic pain.  Recent ultrasound of the pelvis shows abnormality in the left adnexa and she is scheduled for MRI of the pelvis in a few days.    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  Kamila Ma has no plan or code status on file. Discussed available resources and provided with information. Confirmed code status reflects current choices pending further ACP  discussions.  Confirmed/documented legally designated decision makers.  Added by Radha Case             Abdominal pannus 12/07/2015     Priority: Medium     Dermatitis fungal 12/07/2015     Priority: Medium     Mixed hearing loss 12/04/2015     Priority: Medium     Acute recurrent maxillary sinusitis 12/04/2015     Priority: Medium     Vitamin D deficiency 05/12/2014     Priority: Medium     GERD (gastroesophageal reflux disease) 06/10/2013     Priority: Medium     Hearing loss 06/10/2013     Priority: Medium        Past Medical History:    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     Diarrhea 01/01/2011     Hearing loss 01/01/2011     Mixed hearing loss 12/4/2015     Multiple allergies 01/01/2011     Other iron deficiency anemia 7/18/2016     Otomycosis of right ear 09/10/2012     Vasovagal near syncope 7/18/2016       Past Surgical History:    Past Surgical History:   Procedure Laterality Date     ADENOIDECTOMY       GI SURGERY      VSG      MOUTH SURGERY  8/2011    wisdom teeth extracted     PET placement       sagital suture release  6/1994     SEPTOPLASTY, TURBINOPLASTY, COMBINED N/A 6/13/2017    Procedure: COMBINED SEPTOPLASTY, TURBINOPLASTY;  SEPTOPLASTY, TURBINATE REDUCTION;  Surgeon: Gina Casillas MD;  Location: [facility]     TONSILLECTOMY         Family History:    Family History   Problem Relation Age of Onset     Hypertension Mother      Migraines Mother      Diabetes Paternal Grandfather      Hypertension Maternal Uncle      Hypertension Maternal Grandfather      Hypertension Maternal Grandmother      Depression Sister      Unknown/Adopted Paternal Grandmother        Social History:  Marital Status:   [2]  Social History     Tobacco Use     Smoking status: Never Smoker     Smokeless tobacco: Never Used    Substance Use Topics     Alcohol use: No     Drug use: No        Medications:    norethindrone (AYGESTIN) 5 MG tablet  Calcium-Vitamin D-Vitamin K (VIACTIV PO)  ferrous sulfate (IRON) 325 (65 FE) MG tablet  Prenatal Multivit-Min-Fe-FA (PRE-SAV PO)          Review of Systems   Constitutional: Negative for activity change, appetite change, chills and fever.   Cardiovascular: Negative for chest pain.   Gastrointestinal: Negative for abdominal pain.   Genitourinary: Positive for menstrual problem and vaginal bleeding. Negative for pelvic pain.   Skin: Negative.    Neurological: Positive for dizziness.        Dizziness and lightheadedness mild with position change.  Denies any significant symptoms at rest.   Hematological: Does not bruise/bleed easily.       Physical Exam   BP: 136/87  Heart Rate: 88  Temp: 99.3  F (37.4  C)  Resp: 18  SpO2: 100 %      Physical Exam  Vitals signs and nursing note reviewed.   Constitutional:       General: She is not in acute distress.     Appearance: Normal appearance. She is obese. She is not ill-appearing, toxic-appearing or diaphoretic.   Cardiovascular:      Rate and Rhythm: Normal rate and regular rhythm.      Pulses: Normal pulses.   Pulmonary:      Effort: Pulmonary effort is normal.   Skin:     General: Skin is warm and dry.      Capillary Refill: Capillary refill takes less than 2 seconds.   Neurological:      General: No focal deficit present.      Mental Status: She is alert and oriented to person, place, and time.   Psychiatric:         Mood and Affect: Mood normal.         ED Course        Procedures               Critical Care time:  none               Results for orders placed or performed during the hospital encounter of 20 (from the past 24 hour(s))   CBC with platelets differential   Result Value Ref Range    WBC 6.9 4.0 - 11.0 10e9/L    RBC Count 4.52 3.8 - 5.2 10e12/L    Hemoglobin 9.1 (L) 11.7 - 15.7 g/dL    Hematocrit 31.4 (L) 35.0 - 47.0 %    MCV 70 (L)  78 - 100 fl    MCH 20.1 (L) 26.5 - 33.0 pg    MCHC 29.0 (L) 31.5 - 36.5 g/dL    RDW 17.6 (H) 10.0 - 15.0 %    Platelet Count 414 150 - 450 10e9/L    Diff Method Automated Method     % Neutrophils 58.0 %    % Lymphocytes 30.1 %    % Monocytes 9.3 %    % Eosinophils 1.6 %    % Basophils 0.7 %    % Immature Granulocytes 0.3 %    Nucleated RBCs 0 0 /100    Absolute Neutrophil 4.0 1.6 - 8.3 10e9/L    Absolute Lymphocytes 2.1 0.8 - 5.3 10e9/L    Absolute Monocytes 0.6 0.0 - 1.3 10e9/L    Absolute Eosinophils 0.1 0.0 - 0.7 10e9/L    Absolute Basophils 0.1 0.0 - 0.2 10e9/L    Abs Immature Granulocytes 0.0 0 - 0.4 10e9/L    Absolute Nucleated RBC 0.0    Comprehensive metabolic panel   Result Value Ref Range    Sodium 139 133 - 144 mmol/L    Potassium 3.8 3.4 - 5.3 mmol/L    Chloride 109 94 - 109 mmol/L    Carbon Dioxide 23 20 - 32 mmol/L    Anion Gap 7 3 - 14 mmol/L    Glucose 71 70 - 99 mg/dL    Urea Nitrogen 12 7 - 30 mg/dL    Creatinine 0.58 0.52 - 1.04 mg/dL    GFR Estimate >90 >60 mL/min/[1.73_m2]    GFR Estimate If Black >90 >60 mL/min/[1.73_m2]    Calcium 8.7 8.5 - 10.1 mg/dL    Bilirubin Total 0.2 0.2 - 1.3 mg/dL    Albumin 3.4 3.4 - 5.0 g/dL    Protein Total 7.1 6.8 - 8.8 g/dL    Alkaline Phosphatase 75 40 - 150 U/L    ALT 25 0 - 50 U/L    AST 12 0 - 45 U/L   HCG qualitative Blood   Result Value Ref Range    HCG Qualitative Serum Negative NEG^Negative   ABO/Rh type and screen   Result Value Ref Range    ABO O     RH(D) Pos     Antibody Screen Neg     Test Valid Only At [facility]        Specimen Expires 02/09/2020        Medications - No data to display    Assessments & Plan (with Medical Decision Making)   Work-up as above.  Hemoglobin is stable.  Mild intermittent dizziness on position change can be treated with oral hydration.  Serum hCG is negative.  Recent pelvic ultrasound on record.  Scheduled for MRI the pelvis in 4 days.  No reasonable or compelling medical indication for imaging at this  point.  Pleasant and talkative.  Start norethindrone 5 mg 3 times a day for 10 days.  Follow-up with primary care and gynecology.  Return here for any worsening symptoms, new symptoms, or other concerns.  No red flag signs or symptoms.  Patient voiced complete understanding and was happy and agreeable.    This document was prepared using a combination of typing and voice generated software.  While every attempt was made for accuracy, spelling and grammatical errors may exist.    I have reviewed the nursing notes.    I have reviewed the findings, diagnosis, plan and need for follow up with the patient.       New Prescriptions    NORETHINDRONE (AYGESTIN) 5 MG TABLET    Take 1 tablet (5 mg) by mouth 3 times daily for 10 days       Final diagnoses:   Excessive vaginal bleeding       2/6/2020   [facility]     Sheila Beebe PA-C  02/06/20 1140

## 2020-02-06 NOTE — TELEPHONE ENCOUNTER
"Patient calls today believing she is having a miscarriage.  She is worried stating \" My periods have been irregular and heavy before but this is worse than before\" Patient states she is bleeding so heavily she is soaking pads immediately, she states she has been passing base ball sized clots.  Patient reports that she believed to be pregnant but had not confirmed the pregnancy as she was so tired of being disappointed by negative tests.    Patient promotes symptoms consistent with anemia.    Patient promotes fatigue, weakness and dizziness.  Patient is emotionally upset, crying and sounds very weak to this writer.  Patient advised to go to ER now.      This writer spoke to the Ob/gyn department in clinic today and they state she should go to ER immediately.  Did advise that patient was welcome to call clinic and get set up for an appointment with their department, once she is evaluated in the ER.  This writer left a message with patient informing her of this.    "

## 2020-02-10 ENCOUNTER — HOSPITAL ENCOUNTER (OUTPATIENT)
Dept: MRI IMAGING | Facility: HOSPITAL | Age: 25
Discharge: HOME | End: 2020-02-10
Attending: FAMILY MEDICINE | Admitting: FAMILY MEDICINE
Payer: COMMERCIAL

## 2020-02-10 DIAGNOSIS — N83.8 MASS OF LEFT OVARY: ICD-10-CM

## 2020-02-10 PROCEDURE — A9585 GADOBUTROL INJECTION: HCPCS | Performed by: RADIOLOGY

## 2020-02-10 PROCEDURE — 72197 MRI PELVIS W/O & W/DYE: CPT | Mod: TC

## 2020-02-10 PROCEDURE — 25500064 ZZH RX 255 OP 636: Performed by: RADIOLOGY

## 2020-02-10 RX ORDER — GADOBUTROL 604.72 MG/ML
10 INJECTION INTRAVENOUS ONCE
Status: COMPLETED | OUTPATIENT
Start: 2020-02-10 | End: 2020-02-10

## 2020-02-10 RX ADMIN — GADOBUTROL 10 ML: 604.72 INJECTION INTRAVENOUS at 17:23

## 2020-02-12 ENCOUNTER — E-VISIT (OUTPATIENT)
Dept: FAMILY MEDICINE | Facility: OTHER | Age: 25
End: 2020-02-12
Payer: COMMERCIAL

## 2020-02-12 DIAGNOSIS — Z53.9 ERRONEOUS ENCOUNTER--DISREGARD: Primary | ICD-10-CM

## 2020-02-14 NOTE — PROGRESS NOTES
Subjective     Kamila Ma is a 24 year old female who presents to clinic today for the following health issues:    HPI   Concern - ovarian cysts and irregular periods.   Onset: years    Description:       Intensity: mild    Progression of Symptoms:  same    Accompanying Signs & Symptoms:  Heavy irregular periods    Previous history of similar problem:    none    Precipitating factors:   Worsened by:     Alleviating factors:  Improved by:     Therapies Tried and outcome:     She had her first ovarian cyst last November; another in February.  On 2020 she went into the emergency department due to heavy flow - she was saturating a pad di 30 minutes.  She was prescribed norethindrone but did not take it as she and her  are trying to conceive.   Bleeding stopped on it's own a few days later.  She had a pelvic ultrasound on 2019 and repeated on 2019.  She then had an MR of pelvis on 2/10/2020 - results are below.      She had gastric bypass on  and lost over 150 pounds.  Over the past 10 months, she has gained almost 80 pounds.  She is wondering about the possibility of PCOS.  She has longstanding issues with anemia - stopped pre- and iron supplement about one month ago.      Patient Active Problem List   Diagnosis     Mixed hearing loss     Acute recurrent maxillary sinusitis     ACP (advance care planning)     Other iron deficiency anemia     Bariatric surgery status     Vasovagal near syncope     Crouzon craniofacial dysostosis     Morbid obesity (H)     Pulmonary nodules     Abdominal pannus     Dermatitis fungal     GERD (gastroesophageal reflux disease)     Hearing loss     Vitamin D deficiency     Past Surgical History:   Procedure Laterality Date     ADENOIDECTOMY       GI SURGERY      VSG      MOUTH SURGERY  2011    wisdom teeth extracted     PET placement       sagital suture release  1994     SEPTOPLASTY, TURBINOPLASTY, COMBINED N/A 2017    Procedure:  COMBINED SEPTOPLASTY, TURBINOPLASTY;  SEPTOPLASTY, TURBINATE REDUCTION;  Surgeon: Gina Casillas MD;  Location: [facility]     TONSILLECTOMY         Social History     Tobacco Use     Smoking status: Never Smoker     Smokeless tobacco: Never Used   Substance Use Topics     Alcohol use: No     Family History   Problem Relation Age of Onset     Hypertension Mother      Migraines Mother      Diabetes Paternal Grandfather      Hypertension Maternal Uncle      Hypertension Maternal Grandfather      Hypertension Maternal Grandmother      Depression Sister      Unknown/Adopted Paternal Grandmother          Current Outpatient Medications   Medication Sig Dispense Refill     Calcium-Vitamin D-Vitamin K (VIACTIV PO) Take 1 tablet by mouth daily.        pantoprazole (PROTONIX) 40 MG EC tablet Take 40 mg by mouth       Prenatal Multivit-Min-Fe-FA (PRE- PO)        Allergies   Allergen Reactions     Hydrocodone-Acetaminophen Nausea and Vomiting     Morphine Nausea and Vomiting     Sulfa Drugs Hives     SULFA (SULFONAMIDE ANTIBIOTICS)     Topamax Diarrhea     Topiramate Diarrhea     Recent Labs   Lab Test 20  1031 19  0836 19  0955 19  2131  16  0829   ALT 25 22  --  28   < > 15   CR 0.58 0.63  --  0.68   < > 0.76   GFRESTIMATED >90 >90  --  >90   < > >90  Non  GFR Calc     GFRESTBLACK >90 >90  --  >90   < > >90  African American GFR Calc     POTASSIUM 3.8 4.0  --  3.3*   < > 4.0   TSH  --   --  1.27  --   --  1.78    < > = values in this interval not displayed.      BP Readings from Last 3 Encounters:   20 124/78   20 136/87   20 104/68    Wt Readings from Last 3 Encounters:   20 129.7 kg (285 lb 14.4 oz)   20 126.1 kg (278 lb)   20 126.1 kg (278 lb)            Reviewed and updated as needed this visit by Provider         Review of Systems   ROS COMP: Constitutional, HEENT, cardiovascular, pulmonary, gi and gu systems are negative, except as  otherwise noted.      Objective    /78 (BP Location: Right arm, Patient Position: Chair, Cuff Size: Adult Large)   Pulse 83   Temp 99.2  F (37.3  C) (Tympanic)   Wt 129.7 kg (285 lb 14.4 oz)   SpO2 98%   BMI 50.64 kg/m    Body mass index is 50.64 kg/m .  Physical Exam   GENERAL: healthy, alert and no distress  RESP: lungs clear to auscultation - no rales, rhonchi or wheezes  CV: regular rate and rhythm, normal S1 S2, no S3 or S4, no murmur, click or rub, no peripheral edema and peripheral pulses strong  ABDOMEN: soft, nontender, no hepatosplenomegaly, no masses and bowel sounds normal  PSYCH: mentation appears normal, affect normal/bright      PROCEDURE: US PELVIC COMPLETE WITH TRANSVAGINAL     HISTORY: concern for solid mass left ovary, previous imaging 1/1/2019;  Abnormal ultrasound of pelvis; Mass of left ovary LMP 1/6/2020     TECHNIQUE: Transabdominal and transvaginal ultrasound of the pelvis.     COMPARISON: 11/1/2019.     FINDINGS: Habitus limits assessment.     Uterus is normal in volume and contour. The endometrium measures 13  mm.     The right ovary is normal in volume. No right adnexal mass is present.     2 distinct structures are present in the left adnexa, one appears to  be a mildly complex cyst measuring 2.6 x 2.2 x 2.3 cm. An adjacent  solid area measuring 1.5 x 1.6 x 1.9 cm may reflect ovarian tissue.      No free fluid is present.                                                                      IMPRESSION: 2 discrete structures in the left adnexa may reflect a  normal ovary with an adjacent complex left paraovarian cyst. The  masslike area on the prior is not as well seen. Assessment is  significantly limited by habitus. Recommend MR pelvis with without  contrast for definitive assessment.     DAVIDA DUENAS MD      MR PELVIS (GYN) WO & W CONTRAST, 2/10/2020 5:45 PM     History: Female, age 24 years; Adnexal mass, US complex or solid mass,  follow up; Mass of left  ovary     Comparison: Pelvic ultrasound 1/24/2020     Technique: Axial diffusion, ADC, T1, T2 fat saturation, proton density  and T1 postcontrast fat saturation; sagittal and coronal T2 fat  saturation and T1 postcontrast fat saturation .     FINDINGS: Complex, cystic lesion is again seen in the left adnexa. The  caudal portion measures 3.1 cm in transverse dimension by 2.5 x 2.5 cm  in AP and craniocaudal dimensions with freely layering debris and high  signal on the T1-weighted images, likely representing a hemorrhagic  cyst.     A more cephalad component measures 2.8 x 2.8 x 2.0 cm in size and has  the appearance of a simple cyst/prominent follicle. Other small  follicles are seen upon the left ovary.     The right ovary is normal with normal-appearing follicles.     The uterus is normal. Cystic changes seen in the lower uterine  segment/upper cervix, likely representing nabothian cysts. Junctional  zone is normal.     Urinary bladder is normal. There is minimal free fluid in the lower  pelvis. There is no evidence of lymphadenopathy. Bony structures are  normal. Rectum is also grossly normal.                                                                      IMPRESSION:   1.  Complex cystic lesion in the left adnexa as seen on recent  ultrasound is felt to represent a benign finding likely representing a  2.7 cm mean diameter hemorrhagic cyst with layering debris adjacent to  a simple appearing 2.5 cm mean diameter prominent follicle/cyst.  Normal follicles otherwise seen within the left and right ovary.     ELEONORA AVINA MD        Assessment & Plan     1. Cyst of left ovary  - OB/GYN REFERRAL - Dr Palma  - Basic metabolic panel  - Hemoglobin A1c  - Testosterone Free and Total  - DHEA sulfate  - Anti-Mullerian hormone  - Prolactin  - TSH with free T4 reflex    2. Menorrhagia with irregular cycle  - OB/GYN REFERRAL - Dr Palma   - Basic metabolic panel  - Hemoglobin A1c  - Testosterone Free and Total  - DHEA  sulfate  - Anti-Mullerian hormone  - Prolactin  - TSH with free T4 reflex     3. Morbid obesity (H)  Continue weight loss efforts.   Increase exercise.     4. Other iron deficiency anemia  Restart iron supplement  - Vitamin B12    5. Bariatric surgery status  - Vitamin B12    Labs are ordered to rule out PCOS  Restart pre-anibal vitamin.     No follow-ups on file.    Saadia Cross, NP  [facility] - [place] [facility]

## 2020-02-17 ENCOUNTER — OFFICE VISIT (OUTPATIENT)
Dept: FAMILY MEDICINE | Facility: OTHER | Age: 25
End: 2020-02-17
Attending: NURSE PRACTITIONER
Payer: COMMERCIAL

## 2020-02-17 VITALS
WEIGHT: 285.9 LBS | SYSTOLIC BLOOD PRESSURE: 124 MMHG | HEART RATE: 83 BPM | TEMPERATURE: 99.2 F | BODY MASS INDEX: 50.64 KG/M2 | OXYGEN SATURATION: 98 % | DIASTOLIC BLOOD PRESSURE: 78 MMHG

## 2020-02-17 DIAGNOSIS — D50.8 OTHER IRON DEFICIENCY ANEMIA: ICD-10-CM

## 2020-02-17 DIAGNOSIS — N83.202 CYST OF LEFT OVARY: Primary | ICD-10-CM

## 2020-02-17 DIAGNOSIS — N92.1 MENORRHAGIA WITH IRREGULAR CYCLE: ICD-10-CM

## 2020-02-17 DIAGNOSIS — Z98.84 BARIATRIC SURGERY STATUS: ICD-10-CM

## 2020-02-17 DIAGNOSIS — E66.01 MORBID OBESITY (H): ICD-10-CM

## 2020-02-17 LAB
ANION GAP SERPL CALCULATED.3IONS-SCNC: 8 MMOL/L (ref 3–14)
BUN SERPL-MCNC: 17 MG/DL (ref 7–30)
CALCIUM SERPL-MCNC: 8.8 MG/DL (ref 8.5–10.1)
CHLORIDE SERPL-SCNC: 108 MMOL/L (ref 94–109)
CO2 SERPL-SCNC: 23 MMOL/L (ref 20–32)
CREAT SERPL-MCNC: 0.66 MG/DL (ref 0.52–1.04)
EST. AVERAGE GLUCOSE BLD GHB EST-MCNC: 97 MG/DL
GFR SERPL CREATININE-BSD FRML MDRD: >90 ML/MIN/{1.73_M2}
GLUCOSE SERPL-MCNC: 84 MG/DL (ref 70–99)
HBA1C MFR BLD: 5 % (ref 0–5.6)
POTASSIUM SERPL-SCNC: 4.1 MMOL/L (ref 3.4–5.3)
SODIUM SERPL-SCNC: 139 MMOL/L (ref 133–144)
TSH SERPL DL<=0.005 MIU/L-ACNC: 1.42 MU/L (ref 0.4–4)

## 2020-02-17 PROCEDURE — 82627 DEHYDROEPIANDROSTERONE: CPT | Performed by: NURSE PRACTITIONER

## 2020-02-17 PROCEDURE — 83036 HEMOGLOBIN GLYCOSYLATED A1C: CPT | Performed by: NURSE PRACTITIONER

## 2020-02-17 PROCEDURE — 84443 ASSAY THYROID STIM HORMONE: CPT | Performed by: NURSE PRACTITIONER

## 2020-02-17 PROCEDURE — 99000 SPECIMEN HANDLING OFFICE-LAB: CPT | Performed by: NURSE PRACTITIONER

## 2020-02-17 PROCEDURE — 84403 ASSAY OF TOTAL TESTOSTERONE: CPT | Performed by: NURSE PRACTITIONER

## 2020-02-17 PROCEDURE — 36415 COLL VENOUS BLD VENIPUNCTURE: CPT | Performed by: NURSE PRACTITIONER

## 2020-02-17 PROCEDURE — 84146 ASSAY OF PROLACTIN: CPT | Performed by: NURSE PRACTITIONER

## 2020-02-17 PROCEDURE — 80048 BASIC METABOLIC PNL TOTAL CA: CPT | Performed by: NURSE PRACTITIONER

## 2020-02-17 PROCEDURE — 83520 IMMUNOASSAY QUANT NOS NONAB: CPT | Mod: 90 | Performed by: NURSE PRACTITIONER

## 2020-02-17 PROCEDURE — 84270 ASSAY OF SEX HORMONE GLOBUL: CPT | Performed by: NURSE PRACTITIONER

## 2020-02-17 PROCEDURE — 40000788 ZZHCL STATISTIC ESTIMATED AVERAGE GLUCOSE: Performed by: NURSE PRACTITIONER

## 2020-02-17 PROCEDURE — 99214 OFFICE O/P EST MOD 30 MIN: CPT | Performed by: NURSE PRACTITIONER

## 2020-02-17 PROCEDURE — 82607 VITAMIN B-12: CPT | Performed by: NURSE PRACTITIONER

## 2020-02-17 RX ORDER — PANTOPRAZOLE SODIUM 40 MG/1
40 TABLET, DELAYED RELEASE ORAL
COMMUNITY
Start: 2020-01-30 | End: 2023-04-12

## 2020-02-17 NOTE — PATIENT INSTRUCTIONS
Assessment & Plan     1. Cyst of left ovary  - OB/GYN REFERRAL - Dr Palma  - Basic metabolic panel  - Hemoglobin A1c  - Testosterone Free and Total  - DHEA sulfate  - Anti-Mullerian hormone  - Prolactin  - TSH with free T4 reflex    2. Menorrhagia with irregular cycle  - OB/GYN REFERRAL - Dr Palma   - Basic metabolic panel  - Hemoglobin A1c  - Testosterone Free and Total  - DHEA sulfate  - Anti-Mullerian hormone  - Prolactin  - TSH with free T4 reflex     3. Morbid obesity (H)  Continue weight loss efforts.   Increase exercise.     4. Other iron deficiency anemia  Restart iron supplement  - Vitamin B12    5. Bariatric surgery status  - Vitamin B12      Restart pre- vitamin.     No follow-ups on file.    Saadia Cross NP  [facility] - [place] [facility]

## 2020-02-17 NOTE — NURSING NOTE
"Chief Complaint   Patient presents with     Fertility       Initial /78 (BP Location: Right arm, Patient Position: Chair, Cuff Size: Adult Large)   Pulse 83   Temp 99.2  F (37.3  C) (Tympanic)   Wt 129.7 kg (285 lb 14.4 oz)   SpO2 98%   BMI 50.64 kg/m   Estimated body mass index is 50.64 kg/m  as calculated from the following:    Height as of 1/20/20: 1.6 m (5' 3\").    Weight as of this encounter: 129.7 kg (285 lb 14.4 oz).  Medication Reconciliation: complete  Ines Hsu LPN  "

## 2020-02-18 LAB
PROLACTIN SERPL-MCNC: 10 UG/L (ref 3–27)
VIT B12 SERPL-MCNC: 541 PG/ML (ref 193–986)

## 2020-02-19 LAB — DHEA-S SERPL-MCNC: 107 UG/DL (ref 35–430)

## 2020-02-20 LAB
MIS SERPL-MCNC: 0.26 NG/ML (ref 0.4–16.02)
SHBG SERPL-SCNC: 34 NMOL/L (ref 30–135)
TESTOST FREE SERPL-MCNC: 0.56 NG/DL (ref 0.08–0.74)
TESTOST SERPL-MCNC: 32 NG/DL (ref 8–60)

## 2020-02-24 ENCOUNTER — E-VISIT (OUTPATIENT)
Dept: FAMILY MEDICINE | Facility: OTHER | Age: 25
End: 2020-02-24
Payer: COMMERCIAL

## 2020-02-24 DIAGNOSIS — Z53.9 ERRONEOUS ENCOUNTER--DISREGARD: Primary | ICD-10-CM

## 2020-03-01 ENCOUNTER — HEALTH MAINTENANCE LETTER (OUTPATIENT)
Age: 25
End: 2020-03-01

## 2020-03-13 ENCOUNTER — OFFICE VISIT (OUTPATIENT)
Dept: FAMILY MEDICINE | Facility: OTHER | Age: 25
End: 2020-03-13
Attending: NURSE PRACTITIONER
Payer: COMMERCIAL

## 2020-03-13 ENCOUNTER — APPOINTMENT (OUTPATIENT)
Dept: LAB | Facility: OTHER | Age: 25
End: 2020-03-13
Attending: NURSE PRACTITIONER
Payer: COMMERCIAL

## 2020-03-13 VITALS
HEIGHT: 63 IN | OXYGEN SATURATION: 99 % | HEART RATE: 85 BPM | DIASTOLIC BLOOD PRESSURE: 72 MMHG | BODY MASS INDEX: 49.61 KG/M2 | SYSTOLIC BLOOD PRESSURE: 110 MMHG | TEMPERATURE: 99.1 F | WEIGHT: 280 LBS

## 2020-03-13 DIAGNOSIS — N92.6 MISSED PERIOD: Primary | ICD-10-CM

## 2020-03-13 LAB — HCG UR QL: NEGATIVE

## 2020-03-13 PROCEDURE — 36415 COLL VENOUS BLD VENIPUNCTURE: CPT | Performed by: NURSE PRACTITIONER

## 2020-03-13 PROCEDURE — 99213 OFFICE O/P EST LOW 20 MIN: CPT | Performed by: NURSE PRACTITIONER

## 2020-03-13 PROCEDURE — 81025 URINE PREGNANCY TEST: CPT | Performed by: NURSE PRACTITIONER

## 2020-03-13 PROCEDURE — 84702 CHORIONIC GONADOTROPIN TEST: CPT | Performed by: NURSE PRACTITIONER

## 2020-03-13 ASSESSMENT — PAIN SCALES - GENERAL: PAINLEVEL: NO PAIN (0)

## 2020-03-13 ASSESSMENT — MIFFLIN-ST. JEOR: SCORE: 1984.2

## 2020-03-13 NOTE — PROGRESS NOTES
Subjective     Kamila Ma is a 24 year old female who presents to clinic today for the following health issues:    HPI   Confirm pregnancy      Duration: n/a     Description (location/character/radiation): positive pregnancy test    Intensity:  n/a    Accompanying signs and symptoms: 4-5 days late for menstrual cycle    History (similar episodes/previous evaluation): None    Precipitating or alleviating factors: None    Therapies tried and outcome: None         Patient Active Problem List   Diagnosis     Mixed hearing loss     Acute recurrent maxillary sinusitis     ACP (advance care planning)     Other iron deficiency anemia     Bariatric surgery status     Vasovagal near syncope     Crouzon craniofacial dysostosis     Morbid obesity (H)     Pulmonary nodules     Abdominal pannus     Dermatitis fungal     GERD (gastroesophageal reflux disease)     Hearing loss     Vitamin D deficiency     Past Surgical History:   Procedure Laterality Date     ADENOIDECTOMY       GI SURGERY      VSG      MOUTH SURGERY  8/2011    wisdom teeth extracted     PET placement       sagital suture release  6/1994     SEPTOPLASTY, TURBINOPLASTY, COMBINED N/A 6/13/2017    Procedure: COMBINED SEPTOPLASTY, TURBINOPLASTY;  SEPTOPLASTY, TURBINATE REDUCTION;  Surgeon: Gina Casillas MD;  Location: [facility]     TONSILLECTOMY         Social History     Tobacco Use     Smoking status: Never Smoker     Smokeless tobacco: Never Used   Substance Use Topics     Alcohol use: No     Family History   Problem Relation Age of Onset     Hypertension Mother      Migraines Mother      Diabetes Paternal Grandfather      Hypertension Maternal Uncle      Hypertension Maternal Grandfather      Hypertension Maternal Grandmother      Depression Sister      Unknown/Adopted Paternal Grandmother          Current Outpatient Medications   Medication Sig Dispense Refill     Calcium-Vitamin D-Vitamin K (VIACTIV PO) Take 1 tablet by mouth daily.         "pantoprazole (PROTONIX) 40 MG EC tablet Take 40 mg by mouth       Prenatal Multivit-Min-Fe-FA (PRE-SAV PO)        Allergies   Allergen Reactions     Hydrocodone-Acetaminophen Nausea and Vomiting     Morphine Nausea and Vomiting     Sulfa Drugs Hives     SULFA (SULFONAMIDE ANTIBIOTICS)     Topamax Diarrhea     Topiramate Diarrhea     Recent Labs   Lab Test 20  1157 20  1031 19  0836 19  0955 19  2131   A1C 5.0  --   --   --   --    ALT  --    --  28   CR 0.66 0.58 0.63  --  0.68   GFRESTIMATED >90 >90 >90  --  >90   GFRESTBLACK >90 >90 >90  --  >90   POTASSIUM 4.1 3.8 4.0  --  3.3*   TSH 1.42  --   --  1.27  --       BP Readings from Last 3 Encounters:   20 110/72   20 124/78   20 136/87    Wt Readings from Last 3 Encounters:   20 127 kg (280 lb)   20 129.7 kg (285 lb 14.4 oz)   20 126.1 kg (278 lb)               Reviewed and updated as needed this visit by Provider         Review of Systems   ROS COMP: Constitutional, HEENT, cardiovascular, pulmonary, gi and gu systems are negative, except as otherwise noted.      Objective    /72 (BP Location: Right arm, Patient Position: Chair, Cuff Size: Adult Regular)   Pulse 85   Temp 99.1  F (37.3  C) (Tympanic)   Ht 1.6 m (5' 3\")   Wt 127 kg (280 lb)   LMP 2020 (Exact Date)   SpO2 99%   BMI 49.60 kg/m    Body mass index is 49.6 kg/m .  Physical Exam   GENERAL: healthy, alert and no distress  MS: no gross musculoskeletal defects noted, no edema  PSYCH: mentation appears normal, affect normal/bright    Results for orders placed or performed in visit on 20   HCG qualitative urine     Status: None   Result Value Ref Range    HCG Qual Urine Negative NEG^Negative             Assessment & Plan     1. Missed period  Continue pre- vitamin  - HCG qualitative urine  - HCG Quantitative Pregnancy, Blood ([id number])         Return if symptoms worsen or fail to improve.    Saadia TURNER" COMFORT Cross  [facility]

## 2020-03-13 NOTE — NURSING NOTE
"Chief Complaint   Patient presents with     Pregnancy Test       Initial /72 (BP Location: Right arm, Patient Position: Chair, Cuff Size: Adult Regular)   Pulse 85   Temp 99.1  F (37.3  C) (Tympanic)   Ht 1.6 m (5' 3\")   Wt 127 kg (280 lb)   SpO2 99%   BMI 49.60 kg/m   Estimated body mass index is 49.6 kg/m  as calculated from the following:    Height as of this encounter: 1.6 m (5' 3\").    Weight as of this encounter: 127 kg (280 lb).  Medication Reconciliation: complete  Angelique Ragsdale LPN    "

## 2020-03-16 LAB — B-HCG SERPL-ACNC: <1 IU/L (ref 0–5)

## 2020-12-13 ENCOUNTER — HEALTH MAINTENANCE LETTER (OUTPATIENT)
Age: 25
End: 2020-12-13

## 2021-01-14 ENCOUNTER — OFFICE VISIT (OUTPATIENT)
Dept: OBGYN | Facility: OTHER | Age: 26
End: 2021-01-14
Attending: OBSTETRICS & GYNECOLOGY
Payer: COMMERCIAL

## 2021-01-14 VITALS
HEIGHT: 63 IN | DIASTOLIC BLOOD PRESSURE: 72 MMHG | HEART RATE: 68 BPM | OXYGEN SATURATION: 99 % | SYSTOLIC BLOOD PRESSURE: 115 MMHG | WEIGHT: 281 LBS | BODY MASS INDEX: 49.79 KG/M2

## 2021-01-14 DIAGNOSIS — E28.2 PCOS (POLYCYSTIC OVARIAN SYNDROME): Primary | ICD-10-CM

## 2021-01-14 DIAGNOSIS — N97.9 FEMALE INFERTILITY: ICD-10-CM

## 2021-01-14 PROCEDURE — 99204 OFFICE O/P NEW MOD 45 MIN: CPT | Performed by: OBSTETRICS & GYNECOLOGY

## 2021-01-14 ASSESSMENT — MIFFLIN-ST. JEOR: SCORE: 1983.74

## 2021-01-14 ASSESSMENT — PAIN SCALES - GENERAL: PAINLEVEL: NO PAIN (0)

## 2021-01-14 NOTE — PROGRESS NOTES
CC: infertility consult   HPI: Kamila Ma is a 25 year old female No LMP recorded. Menses are regular q 28-30 days, lasting 7 days.  The patient has been trying to conceive for 1 year.  She has tried supplements, home timed intercourse and ODK(equivocol)  Past GYN history:  No STD history       Last PAP smear:  None  History of abnormal PAP: No  History of surgery to cervix: No   Dysmenorrhea mild  PID History: No  History of  IUD use: No  Galactorrhea: No  GUILLERMO exposure: No  Hirsuitism: Yes, chin, chest  Significant change in weight within last year: H/o gastric sleeve.  Initially lost 100lbs but then regained after being put on BC.         Prior pregnancy history is as follows:G0 (once possible chemical pregnancy)    PAST INFERTILITY EVALUATION AND TREATMENT:  Patient's work up has included:      Hormonal evaluation has included:   Testosterone nml   TSH nml  Prolacin nml   Semen Analysis on partner was Not Examined  Past infertility treatment included  None    Partner is 30 years old. No history of trauma to the genitalia, medications, tobacco, drug use. No prior SA. No prior children.    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     Diarrhea 01/01/2011     Hearing loss 01/01/2011     Mixed hearing loss 12/4/2015     Multiple allergies 01/01/2011     Other iron deficiency anemia 7/18/2016     Otomycosis of right ear 09/10/2012     Vasovagal near syncope 7/18/2016       Past Surgical History:   Procedure Laterality Date     ADENOIDECTOMY       GI SURGERY      VSG      MOUTH SURGERY  8/2011    wisdom teeth extracted     PET placement       sagital suture release  6/1994     SEPTOPLASTY, TURBINOPLASTY, COMBINED N/A 6/13/2017  "   Procedure: COMBINED SEPTOPLASTY, TURBINOPLASTY;  SEPTOPLASTY, TURBINATE REDUCTION;  Surgeon: Gina Casillas MD;  Location: [facility]     TONSILLECTOMY             Social History     Tobacco Use     Smoking status: Never Smoker     Smokeless tobacco: Never Used   Substance Use Topics     Alcohol use: No              Family History   Problem Relation Age of Onset     Hypertension Mother      Migraines Mother      Diabetes Paternal Grandfather      Hypertension Maternal Uncle      Hypertension Maternal Grandfather      Hypertension Maternal Grandmother      Depression Sister      Unknown/Adopted Paternal Grandmother        Current Outpatient Medications   Medication Sig Dispense Refill     Calcium-Vitamin D-Vitamin K (VIACTIV PO) Take 1 tablet by mouth daily.        metFORMIN (GLUCOPHAGE) 500 MG tablet Take 1 tablet (500 mg) by mouth 2 times daily (with meals) 180 tablet 3     pantoprazole (PROTONIX) 40 MG EC tablet Take 40 mg by mouth       Prenatal Multivit-Min-Fe-FA (PRE-SAV PO)              EXAM:  Blood pressure 115/72, pulse 68, height 1.6 m (5' 3\"), weight 127.5 kg (281 lb), SpO2 99 %, not currently breastfeeding.   BMI= Body mass index is 49.78 kg/m .  General - pleasant female in no acute distress  Normal mental status  Abdomen - obese  Pelvic/rectal - deferred.  Extrem:  neg          ASSESSMENT/ PLAN:  (E28.2) Suspected PCOS (polycystic ovarian syndrome)  (primary encounter diagnosis)  Comment: Hirsutism, likely anovulation, morbid obesity  Plan: metFORMIN (GLUCOPHAGE) 500 MG tablet          Discussed metabolic etiology and long term health consequences and progression to diabetes if patient continues on current course and effects on menses and fertility.  .   Discussed 3 prong approach low carbohydrate diet, exercise, medication therapy with Metformin.  R/B/ SE's discussed.  Rx 500 mg bid with instructions to start 1/2 tab daily and gradually work up to full dose.    (N97.9) Female " infertility  Comment: Recommend fertility w/u  Plan: Progesterone (day 21)       Pt will call when period starts to schedule day 3 labs(FSH, estradiol, LH), and HSG  -SA odered  Annual exam/Pap scheduled  F/u appt after testing complete to discuss results and POC.  Healthy lifestyle measures, wt loss discussed.    Pt has my card and phone number to call as needed if problems in the interim or she does not hear her results.   45 minutes were spent with the patient with greater than 50% of the visit spent in face-to-face counseling and coordination of care.        Pietro Palma MD

## 2021-01-14 NOTE — NURSING NOTE
"Chief Complaint   Patient presents with     Infertililty       Initial /72 (BP Location: Left arm, Cuff Size: Adult Large)   Pulse 68   Ht 1.6 m (5' 3\")   Wt 127.5 kg (281 lb)   SpO2 99%   BMI 49.78 kg/m   Estimated body mass index is 49.78 kg/m  as calculated from the following:    Height as of this encounter: 1.6 m (5' 3\").    Weight as of this encounter: 127.5 kg (281 lb).  Medication Reconciliation: complete  Yessica Morrissey LPN  "

## 2021-01-26 ENCOUNTER — OFFICE VISIT (OUTPATIENT)
Dept: OBGYN | Facility: OTHER | Age: 26
End: 2021-01-26
Attending: NURSE PRACTITIONER
Payer: COMMERCIAL

## 2021-01-26 VITALS
DIASTOLIC BLOOD PRESSURE: 70 MMHG | WEIGHT: 267 LBS | HEART RATE: 90 BPM | HEIGHT: 63 IN | SYSTOLIC BLOOD PRESSURE: 110 MMHG | BODY MASS INDEX: 47.31 KG/M2

## 2021-01-26 DIAGNOSIS — Z01.419 WELL WOMAN EXAM WITH ROUTINE GYNECOLOGICAL EXAM: ICD-10-CM

## 2021-01-26 DIAGNOSIS — N97.9 FEMALE INFERTILITY: ICD-10-CM

## 2021-01-26 DIAGNOSIS — Z12.4 SCREENING FOR CERVICAL CANCER: Primary | ICD-10-CM

## 2021-01-26 PROCEDURE — 99395 PREV VISIT EST AGE 18-39: CPT | Performed by: NURSE PRACTITIONER

## 2021-01-26 PROCEDURE — 84144 ASSAY OF PROGESTERONE: CPT | Performed by: OBSTETRICS & GYNECOLOGY

## 2021-01-26 PROCEDURE — 36415 COLL VENOUS BLD VENIPUNCTURE: CPT | Performed by: OBSTETRICS & GYNECOLOGY

## 2021-01-26 PROCEDURE — G0123 SCREEN CERV/VAG THIN LAYER: HCPCS | Performed by: NURSE PRACTITIONER

## 2021-01-26 ASSESSMENT — PAIN SCALES - GENERAL: PAINLEVEL: NO PAIN (0)

## 2021-01-26 ASSESSMENT — MIFFLIN-ST. JEOR: SCORE: 1920.23

## 2021-01-26 NOTE — NURSING NOTE
"Chief Complaint   Patient presents with     Gyn Exam       Initial /70   Pulse 90   Ht 1.6 m (5' 3\")   Wt 121.1 kg (267 lb)   BMI 47.30 kg/m   Estimated body mass index is 47.3 kg/m  as calculated from the following:    Height as of this encounter: 1.6 m (5' 3\").    Weight as of this encounter: 121.1 kg (267 lb).  Medication Reconciliation: complete  Lizz Carranza LPN    "

## 2021-01-27 LAB — PROGEST SERPL-MCNC: 7.1 NG/ML

## 2021-01-28 ASSESSMENT — PATIENT HEALTH QUESTIONNAIRE - PHQ9: SUM OF ALL RESPONSES TO PHQ QUESTIONS 1-9: 0

## 2021-01-28 NOTE — PROGRESS NOTES
CC:  Annual exam  HPI:  Kamila Greer is a 25 year old female P0 No LMP recorded.  Periods are irregular lasting 5-7 days.  She usually has one heavy day where she is changing a pad sometimes hourly and has lots of cramping.  She was recently started on Metformin for PCOS and is working with Dr. Palma on pregnancy planning and infertility. She and her  have been trying to conceive for 14 cycles.  She has never had a pap smear or pelvic exam.   She does note that for the past 4-5 days she has had significant breast tenderness and daily n/v.  She is planning to take a home pregnancy test next week.     No other c/o.      Past GYN history:  No STD history  STI testing offered?  Declined       Last PAP smear:  Initial pap to be completed today.      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     Diarrhea 01/01/2011     Hearing loss 01/01/2011     Mixed hearing loss 12/4/2015     Multiple allergies 01/01/2011     Other iron deficiency anemia 7/18/2016     Otomycosis of right ear 09/10/2012     Vasovagal near syncope 7/18/2016       Past Surgical History:   Procedure Laterality Date     ADENOIDECTOMY       GI SURGERY      VSG      MOUTH SURGERY  8/2011    wisdom teeth extracted     PET placement       sagital suture release  6/1994     SEPTOPLASTY, TURBINOPLASTY, COMBINED N/A 6/13/2017    Procedure: COMBINED SEPTOPLASTY, TURBINOPLASTY;  SEPTOPLASTY, TURBINATE REDUCTION;  Surgeon: Gina Casillas MD;  Location: [facility]     TONSILLECTOMY         Family History   Problem Relation Age of Onset     Hypertension Mother      Migraines Mother      Diabetes Paternal Grandfather      Hypertension Maternal Uncle      Hypertension Maternal Grandfather      Hypertension Maternal Grandmother      Depression Sister      Unknown/Adopted  "Paternal Grandmother        Current Outpatient Medications   Medication Sig Dispense Refill     Calcium-Vitamin D-Vitamin K (VIACTIV PO) Take 1 tablet by mouth daily.        metFORMIN (GLUCOPHAGE) 500 MG tablet Take 1 tablet (500 mg) by mouth 2 times daily (with meals) 180 tablet 3     pantoprazole (PROTONIX) 40 MG EC tablet Take 40 mg by mouth       Prenatal Multivit-Min-Fe-FA (PRE-SAV PO)          Allergies: Hydrocodone-acetaminophen, Morphine, Sulfa drugs, Topamax, and Topiramate    ROS:  CONSTITUTIONAL:NEGATIVE for fever, chills, change in weight  INTEGUMENTARY/SKIN: NEGATIVE for worrisome rashes, moles or lesions  RESP:NEGATIVE for significant cough or SOB  BREAST: POSITIVE for breast tenderness  CV: NEGATIVE for chest pain, palpitations or peripheral edema  GI: POSITIVE for nausea and vomiting  : negative for dyspareunia or vaginal discharge  PSYCHIATRIC: NEGATIVE for changes in mood or affect    EXAM:  Blood pressure 110/70, pulse 90, height 1.6 m (5' 3\"), weight 121.1 kg (267 lb), not currently breastfeeding.   BMI= Body mass index is 47.3 kg/m .  General - pleasant female in no acute distress.  Neck - supple without lymphadenopathy or thyromegaly.  Lungs - clear to auscultation bilaterally.  Heart - regular rate and rhythm without murmur.  Breast - no nodularity, asymmetry or nipple discharge bilaterally.  Abdomen - soft, nontender, nondistended, no hepatosplenomegaly.  Pelvic - EG: normal adult female, BUS: within normal limits, Vagina: well rugated, no discharge, Cervix: no lesions or CMT, Uterus: firm, normal sized and nontender, Adnexae: no masses or tenderness.  Rectovaginal - deferred.  Musculoskeletal - no gross deformities.  Neurological - normal strength, sensation, and mental status.      ASSESSMENT/PLAN:  (Z12.4) Screening for cervical cancer  (primary encounter diagnosis)  Comment:   Plan: A pap thin layer screen reflex to HPV if ASCUS         - recommend age 25 - 29            (Z01.419) " Well woman exam with routine gynecological exam  Comment:   Plan: return annually and as needed.      Discussed exercise and healthy eating, including calcium intake.  She should return to the clinic in one year, or sooner if problems arise.

## 2021-01-28 NOTE — PATIENT INSTRUCTIONS
Patient Education     Breast Health: Breast Self-Awareness  What is breast self-awareness?  Breast self-awareness is knowing how your breasts normally look and feel. Your breasts change as you go through different stages of your life. So it s important to learn what is normal for your breasts. Knowing about your breasts helps you spot any changes in them right away. Tell your healthcare provider about any changes.   Why is breast self-awareness important?   Many experts now say that women should focus on breast self-awareness instead of doing a breast self-examination (BSE). These experts include the American Cancer Society and the American Congress of Obstetricians and Gynecologists. Some experts even advise not teaching women to do a BSE. That s because research hasn t shown a clear benefit to doing BSEs.   Breast self-awareness is different than a BSE. It isn t about following a certain method and schedule. It s about knowing what's normal for your breasts. That way you can spot even small changes right away. If you see any changes, tell your healthcare provider.   Changes to look for  Call your healthcare provider if you find any changes in your breasts that worry you. These changes may be:     A lump    Nipple discharge other than breast milk, especially if it's bloody    Swelling    A change in size or shape    Skin changes, such as redness, thickening, or dimpling of the skin    Swollen lymph nodes in the armpit    Nipple problems, such as pain or redness  If you find a lump  Call your provider if you find lumpiness in one breast. Also call if you feel something different in the tissue or feel a definite lump. Sometimes lumpiness may be due to menstrual changes. But there may be reason for concern.   Your provider may want to see you right away if you have:     Nipple discharge that is bloody    Skin changes on your breast, such as dimpling or puckering  It s okay to be upset if you find a lump. Be sure to  call your provider right away. Remember that most breast lumps are benign. This means they are not cancer.   [employer] last reviewed this educational content on 5/1/2020 2000-2020 [employer]. [address] [place] [address], [place] [address]. All rights reserved. This information is not intended as a substitute for professional medical care. Always follow your healthcare professional's instructions.           Patient Education     The Range of Pap Test Results  When your Pap test is sent to the lab, the lab studies your cell samples and reports any abnormal cell changes. Your healthcare provider can discuss these changes with you. In some cases, an abnormal Pap test is due to an infection. More serious cell changes range from dysplasia to cancer. Talk to your healthcare provider about your Pap test.     Normal results  Cervical cells, even normal ones, are always changing. As they mature, normal squamous cells move from deeper layers within the cervix. Over time, these cells flatten and cover the surface of the cervix. Within the cervical canal, the cells are different. These glandular cells are taller and not as flat as the cells on the surface of the cervix. When a Pap test sample shows healthy cells of both types, the results are negative. Keep having Pap tests as often as directed.   Abnormal results  A positive Pap test result means some cells in the sample showed abnormal changes. These results are grouped by the type of cell change and the location, or extent, of the changes. Depending on the results, you may need further testing.     Inflammation. Noncancerous changes are present. They may be due to normal cell repair. Or they may be caused by an infection, such as HPV or yeast. Further testing may be needed. (Also called reactive cellular changes.)    Atypical squamous cells. Test results are unclear. Cells on the surface of the cervix show changes, but their significance is not yet known. Testing  for HPV and other sexually transmitted infections (STIs) may be needed. Treatment may be required. (Reported as ASC-US or ASC-H.)    Atypical glandular cells. Cells lining the cervical canal show abnormal changes. Further testing is likely. You may also have treatment to destroy or remove problem cells. (Reported as AGC.)    Mild dysplasia. Cells show distinct changes. More testing or HPV typing may be done. You may also have treatment to destroy or remove problem cells. (Reported as low-grade LUIS or MILES 1.)    Moderate to severe dysplasia. Cells show precancerous changes. Or noninvasive cancer (carcinoma in situ) may be present. Treatment to destroy or remove problem cells is likely. (Reported as high-grade LUIS or MILES 2 or MILES 3.)    Cancer. Different types of cancer may be detected by your Pap test. More tests to assess the cancer's extent are likely. The type of treatment will depend on the test results and other factors, such as age and health history. (Reported as squamous cell carcinoma, endocervical adenocarcinoma in situ, or adenocarcinoma.)  [employer] last reviewed this educational content on 6/1/2020 2000-2020 [employer]. [address] [place] [address]. All rights reserved. This information is not intended as a substitute for professional medical care. Always follow your healthcare professional's instructions.           Patient Education     Why Have a Pap Test?  Early on, cervical changes don't cause symptoms. Often the only way to know you have cervical changes is to do a Pap test. A Pap test can find these problems early, when they are easier to treat. Pap tests can also find some infections of the cervix and vagina.   What is a Pap test?  A Pap test is a procedure that helps find changes in the cervix that may lead to cancer. The cervix is the part of the uterus that opens into the vagina. For this test, a small sample of cells is taken from the cervix. This is done in your  healthcare provider s office. The cells are then analyzed in a lab. A Pap test is a safe procedure. It takes just a few minutes and causes little or no discomfort.   The HPV connection  Human papillomavirus (HPV) is a family of viruses that spread through skin contact. Certain types are almost always spread through sexual contact. Some HPV types cause genital warts (condyloma). But not all types of HPV cause symptoms you can see. Certain types cause cell changes (dysplasia) in the cervix that can lead to cancer. In fact, HPV infection is the most important risk factor for cervical cancer. Healthcare providers can now test for HPV. Testing for HPV is often done with the Pap test. That s why it s important to have Pap tests as advised by your healthcare provider. This helps ensure that any abnormal cells will be found and treated before they become cancer.   Who should have a Pap test and HPV test?  Ask your healthcare provider when to start having Pap tests, if you should have an HPV test done at the same time, and how often to have them. Follow these guidelines from the American Cancer Society for cervical cancer screening:     A first Pap test at age 21, and then every 3 years until age 29. HPV testing is not advised during this time. But it may be done to follow up on an abnormal Pap test.    Starting at age 30, the preferred testing is a Pap test done with an HPV test every 5 years. This should be done until age 65. Another option for women in this 30 to 65 age group is to have just the Pap test done every 3 years.    You may need a different screening schedule if you are at high risk for cervical cancer. Risk factors include having HIV, a weak immune system, or exposure to the medicine GUILLERMO while your mother was pregnant with you. Talk with your provider about the best schedule for you.    If you re over 65 and have had regular screenings for the last 10 years with no abnormal results in the last 20 years, you  may stop cervical cancer screening.    If you had a hysterectomy that included removing your cervix, you can stop screening unless the hysterectomy was done to treat cervical cancer or precancer. If you still have your cervix after the hysterectomy, you should keep screening according to the above guidelines.    Routine testing doesn't need to be done each year. But if your test is abnormal, your provider will let you know how often to be tested.     Women who have been vaccinated for HPV should still follow these guidelines.    If you have had cervical cancer, talk with your provider about the screening plan that's best for you.  [employer] last reviewed this educational content on 6/1/2020 2000-2020 [employer]. [address] [place] [address], [place] [address]. All rights reserved. This information is not intended as a substitute for professional medical care. Always follow your healthcare professional's instructions.

## 2021-01-29 LAB
COPATH REPORT: NORMAL
PAP: NORMAL

## 2021-03-03 ENCOUNTER — OFFICE VISIT (OUTPATIENT)
Dept: FAMILY MEDICINE | Facility: OTHER | Age: 26
End: 2021-03-03
Attending: NURSE PRACTITIONER
Payer: OTHER GOVERNMENT

## 2021-03-03 ENCOUNTER — TELEPHONE (OUTPATIENT)
Dept: FAMILY MEDICINE | Facility: OTHER | Age: 26
End: 2021-03-03

## 2021-03-03 ENCOUNTER — NURSE TRIAGE (OUTPATIENT)
Dept: FAMILY MEDICINE | Facility: OTHER | Age: 26
End: 2021-03-03

## 2021-03-03 VITALS
DIASTOLIC BLOOD PRESSURE: 78 MMHG | SYSTOLIC BLOOD PRESSURE: 138 MMHG | OXYGEN SATURATION: 99 % | BODY MASS INDEX: 50.5 KG/M2 | HEIGHT: 63 IN | HEART RATE: 93 BPM | WEIGHT: 285 LBS | TEMPERATURE: 99.1 F

## 2021-03-03 DIAGNOSIS — R07.0 THROAT PAIN: ICD-10-CM

## 2021-03-03 DIAGNOSIS — Z20.822 SUSPECTED 2019 NOVEL CORONAVIRUS INFECTION: Primary | ICD-10-CM

## 2021-03-03 LAB
DEPRECATED S PYO AG THROAT QL EIA: NEGATIVE
SPECIMEN SOURCE: NORMAL

## 2021-03-03 PROCEDURE — 87651 STREP A DNA AMP PROBE: CPT | Performed by: NURSE PRACTITIONER

## 2021-03-03 PROCEDURE — 99N1174 PR STATISTIC STREP A RAPID: Performed by: NURSE PRACTITIONER

## 2021-03-03 PROCEDURE — U0005 INFEC AGEN DETEC AMPLI PROBE: HCPCS | Performed by: NURSE PRACTITIONER

## 2021-03-03 PROCEDURE — 99213 OFFICE O/P EST LOW 20 MIN: CPT | Performed by: NURSE PRACTITIONER

## 2021-03-03 PROCEDURE — U0003 INFECTIOUS AGENT DETECTION BY NUCLEIC ACID (DNA OR RNA); SEVERE ACUTE RESPIRATORY SYNDROME CORONAVIRUS 2 (SARS-COV-2) (CORONAVIRUS DISEASE [COVID-19]), AMPLIFIED PROBE TECHNIQUE, MAKING USE OF HIGH THROUGHPUT TECHNOLOGIES AS DESCRIBED BY CMS-2020-01-R: HCPCS | Performed by: NURSE PRACTITIONER

## 2021-03-03 ASSESSMENT — PAIN SCALES - GENERAL: PAINLEVEL: MILD PAIN (2)

## 2021-03-03 ASSESSMENT — MIFFLIN-ST. JEOR: SCORE: 2001.88

## 2021-03-03 NOTE — PATIENT INSTRUCTIONS
Patient Education     Understanding COVID-19 (Coronavirus Disease 2019)  COVID-19 is an illness of the lungs. It causes fever, coughing and trouble breathing. The illness is caused by a new virus in the coronavirus family called SARS-CoV-2.  First seen in late 2019, this virus has spread to many cities and countries around the world. It seems to spread and infect people fairly easily. Scientists are working to understand it better.  For the latest information, visit the CDC website at www.cdc.gov/coronavirus/2019-ncov.html Or call 969-JPP-ARCC ([phone]).   How does COVID-19 spread?  The virus may spread by:    Breathing in droplets of fluid that someone has coughed or sneezed into the air.    Touching your eyes, nose or mouth after touching an infected surface. (The virus can live on handles, objects and other surfaces.)  What are the symptoms of COVID-19?  Symptoms may appear 2 to 14 days after contact with the virus. They can include:    Fever    Dry cough    Trouble breathing    Other flu-like symptoms  Many people have no symptoms or only mild symptoms.  In some cases, this virus can cause infection (pneumonia) in both lungs. This can make a person very ill, and it can even cause death.  Am I at risk?  You are at risk for getting COVID-19 if:    You live in (or recently traveled to) an area with a COVID-19 outbreak    You had contact with a sick person who recently traveled to an area with an outbreak    You had contact with someone who has or may have COVID-19  Your chances of severe illness are higher if:    You are an older adult    You have heart or lung disease    You have diabetes or another serious health issue  How is COVID-19 diagnosed?  Your care team will ask about your symptoms, recent travel and contact with sick people.     Not everyone will be tested for the virus. If you need to be tested, we will use a cotton swab to take a sample of mucus from your nose or throat. Or, we may collect  "mucus that you have coughed up from your lungs (sputum).  How is COVID-19 treated?  At this time, there is no medicine to treat COVID-19. If you get sick, there are things you can do to help your body fight the virus. These may include:    Medicine (acetaminophen) to help ease pain and reduce fever.    Bed rest to help your body fight the illness.  If you are very sick, you may need to stay in the hospital. We may give you fluids through a vein to keep you hydrated (IV fluids). To make sure your body gets enough oxygen, we may give you an oxygen mask or a breathing machine (ventilator).  How can I protect myself and others from COVID-19?  Be prepared. Try to keep a 2-week supply of medicines, food and other household items. Plan who will care for you, your children and pets if you get sick. And, stay informed about COVID-19 in your area.  There is no vaccine yet for COVID-19. To protect yourself and others:    Wash your hands often. Use soap and clean, running water for at least 20 seconds.    If you can t use soap and water, use hand . Make sure it has at least 60% alcohol.    Don't touch your eyes, nose or mouth unless you have clean hands.    Try to avoid \"high-touch\" public surfaces, like doorknobs. Don't shake hands.    Clean home and work surfaces often with disinfectant.    Cough or sneeze into a tissue, then throw the tissue into the trash. (If you don't have tissues, cough or sneeze into the bend of your elbow.) Wash your hands after coughing or sneezing.    Don t share food or household items, like eating and drinking utensils.    Stay about 6 feet away from others as much you can. This is called  social distancing.     Stay away from people who are sick.    Try to avoid areas that have a COVID-19 outbreak.  For the latest travel alerts, visit the CDC website at: www.cdc.gov/coronavirus/2019-ncov/travelers   If you ve been in an area with COVID-19 in the last 14 days:    You may need to stay home " or limit your activities for up to 14 days. Call your care team for instructions.    Check for fever. Take your temperature every morning and evening for at least 14 days. Keep a record of the readings.    Stay home if you are sick for any reason.    You do not need to wear a face mask unless you are sick.    Watch for symptoms of the virus.  If you have COVID-19 (or symptoms of COVID-19):    Stay home and contact your care team. We will tell you what to do.    Don t leave home unless you need to get medical care. Don't go to work, school or public areas. Don't use buses, taxis or other public transportation.    Wash your hands often.    Stay away from other people in your home. Limit visitors. No kissing. No sharing household items.    Clean any surfaces you touch with disinfectant.    Cough or sneeze into a tissue, throw the tissue in the trash, then wash your hands. If you don't have tissues, cough or sneeze into the bend of your elbow.    Wear a face mask to protect others from your germs. If you can t wear a mask, your caregivers should wear one.    If you need to go to the hospital or clinic, call ahead to let them know. Expect the care team to wear masks, gowns, gloves and eye protection. You may be put in a separate room.    Follow all instructions from your care team.    Don t panic. Keep in mind that other illnesses can cause similar symptoms.  If you are caring for a sick person:    Follow all instructions from the care team.    Wash your hands often.    Wear protective clothing as advised.    Make sure the sick person wears a face mask. If they can't wear a mask, don't stay in the same room with the person. If you must be in the same room, wear a face mask.    Keep track of the sick person s symptoms.    Clean surfaces, fabrics and laundry well and often.    Keep other people and pets away from the sick person.  Should I worry about my pets?  At this time, there are no reports of pets getting sick with  COVID-19 or spreading the virus that causes it. Until we know more, be sure to:    Wash your hands after touching any animals.    Don't touch animals that may be sick.    Keep pets away from sick people.    Limit your contact with pets and animals if you are sick.  When to contact your care team    Before coming to the hospital or clinic    If you have symptoms of COVID-19 and have recently been in an area with an outbreak    If you have COVID-19 and your symptoms are worse   This information has been modified by your health care provider with permission from the publisher.  For informational purposes only. Not to replace the advice of your health care provider. Copyright   2020 [facility]. All rights reserved.         www.cdc.gov

## 2021-03-03 NOTE — TELEPHONE ENCOUNTER
Cough started Saturday and has coughing fits.Nasal congestion,sore throat. No fever.No body aches. No chest pressure or pain. SOB at times during coughing fits.Otherwise no SOB. No lost of smell and taste. Coughing up phlegm in AM that is clear colored. Clear nasal congestions.Having to sit up in bed d/t post nasal drip.Updated her that these could be s/s compatible with Covid.    She is asking for an appointment.       Spoke with PCP.Pt agrees to to telephone visit with Kaila. Then asked to come into office with another provider.    Scheduled.    Next 5 appointments (look out 90 days)    Mar 03, 2021  3:15 PM  (Arrive by 3:00 PM)  SHORT with Myrna Oneil CNP  [facility] [address] [address]  [phone]        Please note.    Shira Hollingsworth RN

## 2021-03-03 NOTE — NURSING NOTE
"Chief Complaint   Patient presents with     URI       Initial /78 (BP Location: Right arm, Patient Position: Chair, Cuff Size: Adult Regular)   Pulse 93   Temp 99.1  F (37.3  C) (Tympanic)   Ht 1.6 m (5' 3\")   Wt 129.3 kg (285 lb)   SpO2 99%   BMI 50.49 kg/m   Estimated body mass index is 50.49 kg/m  as calculated from the following:    Height as of this encounter: 1.6 m (5' 3\").    Weight as of this encounter: 129.3 kg (285 lb).  Medication Reconciliation: complete  Sunita Atkinson LPN  "

## 2021-03-03 NOTE — TELEPHONE ENCOUNTER
Notified of negative strep test.   [Subsequent Evaluation] : a subsequent evaluation for [Mother] : mother [FreeTextEntry2] : recurrent ear infections

## 2021-03-03 NOTE — LETTER
March 3, 2021      Kamila Greer  [address] [address]        To Whom It May Concern:    Kamila Greer  was seen on 3/3/2021.  Please excuse her per CDC guidelines.       Sincerely,        Myrna Oneil, CNP

## 2021-03-03 NOTE — PROGRESS NOTES
"    Assessment & Plan   Problem List Items Addressed This Visit     None      Visit Diagnoses     Suspected 2019 novel coronavirus infection    -  Primary    Relevant Orders    Symptomatic COVID-19 Virus (Coronavirus) by PCR (Completed)    Group A Streptococcus PCR Throat Swab (Completed)    SARS-CoV-2 COVID-19 Virus (Coronavirus) by PCR (Completed)    Throat pain        Relevant Orders    Streptococcus A Rapid Scr w Reflx to PCR ([facility] Only) (Completed)      Return if symptoms worsen or fail to improve.    Myrna Oneil, CNP  [facility]    Andrew Treviño is a 25 year old who presents for the following health issues     HPI       Acute Illness  Acute illness concerns: Cough/Congestion   Onset/Duration: 2 days   Symptoms:  Fever: no  Chills/Sweats: no  Headache (location?): no  Sinus Pressure: no  Conjunctivitis:  no  Ear Pain: YES: bilateral  Rhinorrhea: YES  Congestion: YES  Sore Throat: YES- post nasal drip   Cough: YES-productive of clear sputum, worsening over time  Wheeze: no  Decreased Appetite: no  Nausea: no  Vomiting: YES- from coughing so hard   Diarrhea: no  Dysuria/Freq.: no  Dysuria or Hematuria: no  Fatigue/Achiness: YES  Sick/Strep Exposure: no  Therapies tried and outcome: nyquil           Review of Systems   Constitutional, HEENT, cardiovascular, pulmonary, gi and gu systems are negative, except as otherwise noted.      Objective    /78 (BP Location: Right arm, Patient Position: Chair, Cuff Size: Adult Regular)   Pulse 93   Temp 99.1  F (37.3  C) (Tympanic)   Ht 1.6 m (5' 3\")   Wt 129.3 kg (285 lb)   SpO2 99%   BMI 50.49 kg/m    Body mass index is 50.49 kg/m .  Physical Exam   GENERAL: healthy, alert and no distress  EYES: Eyes grossly normal to inspection, PERRL and conjunctivae and sclerae normal  HENT: normal cephalic/atraumatic, ear canals and TM's normal, nose and mouth without ulcers or lesions and oral mucous membranes moist. Mild clear " post nasal drainage.   NECK: no adenopathy, no asymmetry, masses, or scars and thyroid normal to palpation  RESP: lungs clear to auscultation - no rales, rhonchi or wheezes  CV: regular rate and rhythm, normal S1 S2, no S3 or S4, no murmur, click or rub, no peripheral edema and peripheral pulses strong    Results for orders placed or performed in visit on 03/03/21   Symptomatic COVID-19 Virus (Coronavirus) by PCR     Status: None    Specimen: Nasopharyngeal   Result Value Ref Range    COVID-19 Virus PCR to [facility] - Source Nasopharyngeal     COVID-19 Virus PCR to [facility] - Result       Test received-See reflex to IDDL test SARS CoV2 (COVID-19) Virus RT-PCR   SARS-CoV-2 COVID-19 Virus (Coronavirus) by PCR     Status: None    Specimen: Nasopharyngeal   Result Value Ref Range    SARS-CoV-2 Virus Specimen Source Nasopharyngeal     SARS-CoV-2 PCR Result NEGATIVE     SARS-CoV-2 PCR Comment       Testing was performed using the mao SARS-CoV-2 assay on the mao 6800 System.2   Streptococcus A Rapid Scr w Reflx to PCR ([place] Only)     Status: None    Specimen: Throat   Result Value Ref Range    Strep Specimen Description Throat     Streptococcus Group A Rapid Screen Negative NEG^Negative   Group A Streptococcus PCR Throat Swab     Status: None    Specimen: Throat   Result Value Ref Range    Specimen Description Throat     Strep Group A PCR Not Detected NDET^Not Detected

## 2021-03-04 LAB
SARS-COV-2 RNA RESP QL NAA+PROBE: NORMAL
SPECIMEN SOURCE: NORMAL
SPECIMEN SOURCE: NORMAL
STREP GROUP A PCR: NOT DETECTED

## 2021-03-05 LAB
LABORATORY COMMENT REPORT: NORMAL
SARS-COV-2 RNA RESP QL NAA+PROBE: NEGATIVE
SPECIMEN SOURCE: NORMAL

## 2021-04-01 ENCOUNTER — VIRTUAL VISIT (OUTPATIENT)
Dept: FAMILY MEDICINE | Facility: OTHER | Age: 26
End: 2021-04-01
Attending: NURSE PRACTITIONER
Payer: COMMERCIAL

## 2021-04-01 DIAGNOSIS — U07.1 COVID-19 VIRUS INFECTION: Primary | ICD-10-CM

## 2021-04-01 DIAGNOSIS — N97.9 FEMALE INFERTILITY: Primary | ICD-10-CM

## 2021-04-01 PROCEDURE — 99214 OFFICE O/P EST MOD 30 MIN: CPT | Mod: 95 | Performed by: NURSE PRACTITIONER

## 2021-04-01 RX ORDER — ALBUTEROL SULFATE 90 UG/1
2 AEROSOL, METERED RESPIRATORY (INHALATION) EVERY 4 HOURS PRN
Qty: 18 G | Refills: 1 | Status: SHIPPED | OUTPATIENT
Start: 2021-04-01 | End: 2023-04-12

## 2021-04-01 RX ORDER — DEXAMETHASONE 6 MG/1
6 TABLET ORAL DAILY
Qty: 5 TABLET | Refills: 0 | Status: SHIPPED | OUTPATIENT
Start: 2021-04-01 | End: 2021-04-30

## 2021-04-01 RX ORDER — AZITHROMYCIN 250 MG/1
TABLET, FILM COATED ORAL
Qty: 11 TABLET | Refills: 0 | Status: SHIPPED | OUTPATIENT
Start: 2021-04-01 | End: 2021-04-11

## 2021-04-01 NOTE — PROGRESS NOTES
"Kamila is a 26 year old who is being evaluated via a billable telephone visit.      What phone number would you like to be contacted at? [phone]  How would you like to obtain your AVS? Mail a copy    Assessment & Plan     1. COVID-19 virus infection  Continue zinc, 81mg aspirin, vit D3.    - azithromycin (ZITHROMAX) 250 MG tablet; Take 2 tablets (500 mg) by mouth daily for 1 day, THEN 1 tablet (250 mg) daily for 9 days.  Dispense: 11 tablet; Refill: 0  - dexamethasone (DECADRON) 6 MG tablet; Take 1 tablet (6 mg) by mouth daily for 5 days  Dispense: 5 tablet; Refill: 0  - albuterol (PROAIR HFA/PROVENTIL HFA/VENTOLIN HFA) 108 (90 Base) MCG/ACT inhaler; Inhale 2 puffs into the lungs every 4 hours as needed for shortness of breath / dyspnea or wheezing  Dispense: 18 g; Refill: 1      Review of the result(s) of each unique test - covid testing         BMI:   Estimated body mass index is 50.49 kg/m  as calculated from the following:    Height as of 3/3/21: 1.6 m (5' 3\").    Weight as of 3/3/21: 129.3 kg (285 lb).           Return if symptoms worsen or fail to improve.    Saadia Cross, NP  [facility]    Subjective   Kamila is a 26 year old who presents for the following health issues     HPI     Acute Illness  Acute illness concerns: cough with shortness of breath.  Tested positive for covid on 3/18/2021 at [facility].    Onset/Duration: 2 weeks   Symptoms:  Fever: no  Chills/Sweats: no  Headache (location?): YES  Sinus Pressure: YES  Conjunctivitis:  YES  Ear Pain: no  Rhinorrhea: YES  Congestion: YES  Sore Throat: no  Cough: YES-productive of clear sputum, productive of yellow sputum  Wheeze: YES  Decreased Appetite: YES  Nausea: no  Vomiting: YES  Diarrhea: YES  Dysuria/Freq.: no  Dysuria or Hematuria: no  Fatigue/Achiness: YES- fatigue a little   Sick/Strep Exposure: YES- covid positive   Therapies tried and outcome: sinus rinse bid otc cough suppressant, vit D, zinc, " humidifier, rest.          Review of Systems   Constitutional, HEENT, cardiovascular, pulmonary, gi and gu systems are negative, except as otherwise noted.      Objective    Vitals - Patient Reported  Weight (Patient Reported): 129.3 kg (285 lb)  Pain Score: No Pain (0)      Vitals:  No vitals were obtained today due to virtual visit.    Physical Exam   alert and no distress  PSYCH: Alert and oriented times 3; coherent speech, normal   rate and volume, able to articulate logical thoughts, able   to abstract reason, no tangential thoughts, no hallucinations   or delusions  Her affect is normal and pleasant  RESP: dry cough, no audible wheezing, able to talk in full sentences  Remainder of exam unable to be completed due to telephone visits                Phone call duration: 15 minutes

## 2021-04-01 NOTE — NURSING NOTE
"Chief Complaint   Patient presents with     Cough       Initial There were no vitals taken for this visit. Estimated body mass index is 50.49 kg/m  as calculated from the following:    Height as of 3/3/21: 1.6 m (5' 3\").    Weight as of 3/3/21: 129.3 kg (285 lb).  Medication Reconciliation: complete  Pamela M. Lechevalier, LPN    "

## 2021-04-07 DIAGNOSIS — N97.9 FEMALE INFERTILITY: ICD-10-CM

## 2021-04-07 LAB
ESTRADIOL SERPL-MCNC: 30 PG/ML
FSH SERPL-ACNC: 11.6 IU/L
LH SERPL-ACNC: 5.9 IU/L

## 2021-04-07 PROCEDURE — 82670 ASSAY OF TOTAL ESTRADIOL: CPT | Performed by: OBSTETRICS & GYNECOLOGY

## 2021-04-07 PROCEDURE — 83002 ASSAY OF GONADOTROPIN (LH): CPT | Performed by: OBSTETRICS & GYNECOLOGY

## 2021-04-07 PROCEDURE — 83001 ASSAY OF GONADOTROPIN (FSH): CPT | Performed by: OBSTETRICS & GYNECOLOGY

## 2021-04-07 PROCEDURE — 36415 COLL VENOUS BLD VENIPUNCTURE: CPT | Performed by: OBSTETRICS & GYNECOLOGY

## 2021-04-08 DIAGNOSIS — N97.9 FEMALE INFERTILITY: Primary | ICD-10-CM

## 2021-04-15 ENCOUNTER — HOSPITAL ENCOUNTER (OUTPATIENT)
Dept: GENERAL RADIOLOGY | Facility: HOSPITAL | Age: 26
Discharge: HOME | End: 2021-04-15
Attending: OBSTETRICS & GYNECOLOGY | Admitting: OBSTETRICS & GYNECOLOGY
Payer: COMMERCIAL

## 2021-04-15 DIAGNOSIS — N97.9 FEMALE INFERTILITY: ICD-10-CM

## 2021-04-15 PROCEDURE — 74740 X-RAY FEMALE GENITAL TRACT: CPT

## 2021-04-15 PROCEDURE — 58340 CATHETER FOR HYSTEROGRAPHY: CPT

## 2021-04-15 PROCEDURE — 58340 CATHETER FOR HYSTEROGRAPHY: CPT | Performed by: OBSTETRICS & GYNECOLOGY

## 2021-04-15 PROCEDURE — 255N000002 HC RX 255 OP 636: Performed by: RADIOLOGY

## 2021-04-15 RX ORDER — IOPAMIDOL 612 MG/ML
50 INJECTION, SOLUTION INTRAVASCULAR ONCE
Status: COMPLETED | OUTPATIENT
Start: 2021-04-15 | End: 2021-04-15

## 2021-04-15 RX ADMIN — IOPAMIDOL 10 ML: 612 INJECTION, SOLUTION INTRAVENOUS at 13:21

## 2021-04-16 NOTE — OP NOTE
Procedure Date: 04/15/2021      INDICATION:  Infertility.      PROCEDURE:  Hystersalpingogram.      PROCEDURE NOTE:  Informed consent for the procedure was obtained and the patient was positioned on the radiology exam table in the dorsal lithotomy position.  The cervix was visualized with a speculum and prepped with Betadine.  The cervix was grasped anteriorly with a fine-tooth tenaculum.  The HSG catheter was introduced.  12 mL of Isovue contrast dye was injected under fluoroscopic guidance.  Images were obtained.  See formal radiology report for interpretation.  The instruments were removed.  The patient tolerated the procedure well.  There were no complications and the patient tolerated the procedure well.         SHERRY NELSON MD             D: 04/15/2021   T: 04/15/2021   MT: [place]      Name:     GORDY SALAZAR   MRN:      [id number]        Account:        [id number]   :      1995           Procedure Date: 04/15/2021      Document: [id number]

## 2021-04-30 ENCOUNTER — OFFICE VISIT (OUTPATIENT)
Dept: OBGYN | Facility: OTHER | Age: 26
End: 2021-04-30
Attending: OBSTETRICS & GYNECOLOGY
Payer: COMMERCIAL

## 2021-04-30 VITALS
HEART RATE: 79 BPM | DIASTOLIC BLOOD PRESSURE: 76 MMHG | OXYGEN SATURATION: 100 % | HEIGHT: 63 IN | BODY MASS INDEX: 50.5 KG/M2 | WEIGHT: 285 LBS | SYSTOLIC BLOOD PRESSURE: 130 MMHG

## 2021-04-30 DIAGNOSIS — N97.9 FEMALE INFERTILITY: Primary | ICD-10-CM

## 2021-04-30 PROCEDURE — 99213 OFFICE O/P EST LOW 20 MIN: CPT | Performed by: OBSTETRICS & GYNECOLOGY

## 2021-04-30 RX ORDER — LETROZOLE 2.5 MG/1
2.5 TABLET, FILM COATED ORAL DAILY
Qty: 5 TABLET | Refills: 2 | Status: SHIPPED | OUTPATIENT
Start: 2021-04-30 | End: 2021-08-03

## 2021-04-30 ASSESSMENT — PAIN SCALES - GENERAL: PAINLEVEL: NO PAIN (0)

## 2021-04-30 ASSESSMENT — MIFFLIN-ST. JEOR: SCORE: 1996.88

## 2021-04-30 NOTE — NURSING NOTE
"Chief Complaint   Patient presents with     Follow Up     infertility       Initial /76 (BP Location: Left arm, Cuff Size: Adult Large)   Pulse 79   Ht 1.6 m (5' 3\")   Wt 129.3 kg (285 lb)   SpO2 100%   BMI 50.49 kg/m   Estimated body mass index is 50.49 kg/m  as calculated from the following:    Height as of this encounter: 1.6 m (5' 3\").    Weight as of this encounter: 129.3 kg (285 lb).  Medication Reconciliation: complete  Yessica Morrissey LPN  "

## 2021-05-05 PROBLEM — N97.9 FEMALE INFERTILITY: Status: ACTIVE | Noted: 2021-05-05

## 2021-05-05 NOTE — PROGRESS NOTES
S:  F/u infertility.  See my prior eval. Pt presents for results of fertility eval and discussion of POC.  She has been doing ODK which are negative.  Day 21 progesterone shows borderline ovulation status. FSH 11.  Remainder of w/u including HSG and SA unremarkable.          Patient Active Problem List   Diagnosis     Mixed hearing loss     Acute recurrent maxillary sinusitis     ACP (advance care planning)     Other iron deficiency anemia     Bariatric surgery status     Vasovagal near syncope     Crouzon craniofacial dysostosis     Morbid obesity (H)     Pulmonary nodules     Abdominal pannus     Dermatitis fungal     GERD (gastroesophageal reflux disease)     Hearing loss     Vitamin D deficiency            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     Diarrhea 01/01/2011     Hearing loss 01/01/2011     Mixed hearing loss 12/4/2015     Multiple allergies 01/01/2011     Other iron deficiency anemia 7/18/2016     Otomycosis of right ear 09/10/2012     Vasovagal near syncope 7/18/2016            Past Surgical History:   Procedure Laterality Date     ADENOIDECTOMY       GI SURGERY      VSG      MOUTH SURGERY  8/2011    wisdom teeth extracted     PET placement       sagital suture release  6/1994     SEPTOPLASTY, TURBINOPLASTY, COMBINED N/A 6/13/2017    Procedure: COMBINED SEPTOPLASTY, TURBINOPLASTY;  SEPTOPLASTY, TURBINATE REDUCTION;  Surgeon: Gina Casillas MD;  Location: [facility]     TONSILLECTOMY              Social History     Tobacco Use     Smoking status: Never Smoker     Smokeless tobacco: Never Used   Substance Use Topics     Alcohol use: No            Family History   Problem Relation Age of Onset     Hypertension Mother      Migraines Mother      Diabetes Paternal Grandfather      Hypertension Maternal Uncle       "Hypertension Maternal Grandfather      Hypertension Maternal Grandmother      Depression Sister      Unknown/Adopted Paternal Grandmother                Allergies   Allergen Reactions     Hydrocodone-Acetaminophen Nausea and Vomiting     Morphine Nausea and Vomiting     Sulfa Drugs Hives     SULFA (SULFONAMIDE ANTIBIOTICS)     Topamax Diarrhea     Topiramate Diarrhea            Current Outpatient Medications   Medication Sig Dispense Refill     Calcium-Vitamin D-Vitamin K (VIACTIV PO) Take 1 tablet by mouth daily.        letrozole (FEMARA) 2.5 MG tablet Take 1 tablet (2.5 mg) by mouth daily Day 3-7 of menstrual cycle 5 tablet 2     metFORMIN (GLUCOPHAGE) 500 MG tablet Take 1 tablet (500 mg) by mouth 2 times daily (with meals) 180 tablet 3     pantoprazole (PROTONIX) 40 MG EC tablet Take 40 mg by mouth       Prenatal Multivit-Min-Fe-FA (PRE- PO)        albuterol (PROAIR HFA/PROVENTIL HFA/VENTOLIN HFA) 108 (90 Base) MCG/ACT inhaler Inhale 2 puffs into the lungs every 4 hours as needed for shortness of breath / dyspnea or wheezing (Patient not taking: Reported on 2021) 18 g 1          Review Of Systems  Constitutional:  Denies fever  GI/ negative except as noted per hpi    O:   /76 (BP Location: Left arm, Cuff Size: Adult Large)   Pulse 79   Ht 1.6 m (5' 3\")   Wt 129.3 kg (285 lb)   SpO2 100%   BMI 50.49 kg/m    Gen:  NAD, A and O           A:p)  Primary infertility with anovulation and morbid obesity.  Pt working on wt loss.  Will try Letrozole 2.5mg day 3-7 of menstrual cycle for ovulation induction.  Repeat day 21 progesterone next cycle to confirm ovulation or adjust dosage accordingly.  Repeat FSH/referral if no pregnancy in 6 months.  Continue healthy life style measures.       "

## 2021-05-12 ENCOUNTER — MYC MEDICAL ADVICE (OUTPATIENT)
Dept: OBGYN | Facility: OTHER | Age: 26
End: 2021-05-12

## 2021-05-12 NOTE — TELEPHONE ENCOUNTER
Yes and yes.  We need to wait until period starts to determine cycle day so once it starts please call and we will reschedule test.  Maribell please order HCG quant blood test have pt pt come in for lab.

## 2021-05-13 DIAGNOSIS — N91.2 AMENORRHEA: ICD-10-CM

## 2021-05-13 DIAGNOSIS — N91.2 AMENORRHEA: Primary | ICD-10-CM

## 2021-05-13 LAB — B-HCG SERPL-ACNC: <1 IU/L (ref 0–5)

## 2021-05-13 PROCEDURE — 36415 COLL VENOUS BLD VENIPUNCTURE: CPT | Performed by: OBSTETRICS & GYNECOLOGY

## 2021-05-13 PROCEDURE — 84702 CHORIONIC GONADOTROPIN TEST: CPT | Performed by: OBSTETRICS & GYNECOLOGY

## 2021-05-17 ENCOUNTER — MYC MEDICAL ADVICE (OUTPATIENT)
Dept: OBGYN | Facility: OTHER | Age: 26
End: 2021-05-17

## 2021-05-17 DIAGNOSIS — N91.2 AMENORRHEA: Primary | ICD-10-CM

## 2021-05-17 RX ORDER — MEDROXYPROGESTERONE ACETATE 10 MG
10 TABLET ORAL DAILY
Qty: 10 TABLET | Refills: 1 | Status: SHIPPED | OUTPATIENT
Start: 2021-05-17 | End: 2021-09-02

## 2021-05-17 NOTE — TELEPHONE ENCOUNTER
Yes I sent a prescription to [facility] for Provera which is a progesterone pill you take for 10 days and then your period should start.

## 2021-05-19 ENCOUNTER — TELEPHONE (OUTPATIENT)
Dept: OBGYN | Facility: OTHER | Age: 26
End: 2021-05-19

## 2021-05-19 ENCOUNTER — MYC MEDICAL ADVICE (OUTPATIENT)
Dept: OBGYN | Facility: OTHER | Age: 26
End: 2021-05-19

## 2021-05-19 NOTE — TELEPHONE ENCOUNTER
Pt wants to know if when she takes the provera and starts her menses if letrozole is supposed to over lap with provera. Please advise

## 2021-05-19 NOTE — TELEPHONE ENCOUNTER
Take home pregnancy test since been 6 days since last one.  If negative  Provera.  Wait for period to start.  Start counting cycle from day 1 of bleeding.    Take Letrozole Day 3-7.

## 2021-06-13 ENCOUNTER — MYC MEDICAL ADVICE (OUTPATIENT)
Dept: OBGYN | Facility: OTHER | Age: 26
End: 2021-06-13

## 2021-06-13 DIAGNOSIS — N97.9 FEMALE INFERTILITY: Primary | ICD-10-CM

## 2021-06-15 RX ORDER — LETROZOLE 2.5 MG/1
7.5 TABLET, FILM COATED ORAL DAILY
Qty: 15 TABLET | Refills: 2 | Status: SHIPPED | OUTPATIENT
Start: 2021-06-15 | End: 2021-09-02

## 2021-06-15 NOTE — TELEPHONE ENCOUNTER
OK was this a normal period you just had?  If so I would think we need to do the Femara again at a higher dose.  You can take it day 5-9 of menstrual cycle.  I put in a new rx to [facility]. We will reschedule  your day 21 Progesterone to July 2 if you are able to come in for lab only.     Maribell please reschedule lab.    smoking, you improve the health of everyone who now breathes in your smoke. · Their heart, lung, and cancer risks drop, much like yours. · They are sick less. For babies and small children, living smoke-free means they're less likely to have ear infections, pneumonia, and bronchitis. · If you're a woman who is or will be pregnant someday, quitting smoking means a healthier . · Children who are close to you are less likely to become adult smokers. Where can you learn more? Go to https://[employer]Kigo. org and sign in to your Just Dial account. Enter [phone] in the nuevoStage box to learn more about \"Learning About Benefits From Quitting Smoking. \"     If you do not have an account, please click on the \"Sign Up Now\" link. Current as of: 2017  Content Version: 11.3  © 1245-1180 [employer]. Care instructions adapted under license by [facility] ([facility]). If you have questions about a medical condition or this instruction, always ask your healthcare professional. [place] [address] any warranty or liability for your use of this information. Patient Education        Learning About Meal Planning for Diabetes  Why plan your meals? Meal planning can be a key part of managing diabetes. Planning meals and snacks with the right balance of carbohydrate, protein, and fat can help you keep your blood sugar at the target level you set with your doctor. You don't have to eat special foods. You can eat what your family eats, including sweets once in a while. But you do have to pay attention to how often you eat and how much you eat of certain foods. You may want to work with a dietitian or a certified diabetes educator. He or she can give you tips and meal ideas and can answer your questions about meal planning. This health professional can also help you reach a healthy weight if that is one of your goals. What plan is right for you?   Your dietitian or diabetes educator may suggest that you start with the plate format or carbohydrate counting. The plate format  The plate format is a simple way to help you manage how you eat. You plan meals by learning how much space each food should take on a plate. Using the plate format helps you spread carbohydrate throughout the day. It can make it easier to keep your blood sugar level within your target range. It also helps you see if you're eating healthy portion sizes. To use the plate format, you put non-starchy vegetables on half your plate. Add meat or meat substitutes on one-quarter of the plate. Put a grain or starchy vegetable (such as brown rice or a potato) on the final quarter of the plate. You can add a small piece of fruit and some low-fat or fat-free milk or yogurt, depending on your carbohydrate goal for each meal.  Here are some tips for using the plate format:  · Make sure that you are not using an oversized plate. A 9-inch plate is best. Many restaurants use larger plates. · Get used to using the plate format at home. Then you can use it when you eat out. · Write down your questions about using the plate format. Talk to your doctor, a dietitian, or a diabetes educator about your concerns. Carbohydrate counting  With carbohydrate counting, you plan meals based on the amount of carbohydrate in each food. Carbohydrate raises blood sugar higher and more quickly than any other nutrient. It is found in desserts, breads and cereals, and fruit. It's also found in starchy vegetables such as potatoes and corn, grains such as rice and pasta, and milk and yogurt. Spreading carbohydrate throughout the day helps keep your blood sugar levels within your target range. Your daily amount depends on several things, including your weight, how active you are, which diabetes medicines you take, and what your goals are for your blood sugar levels.  A registered dietitian or diabetes educator can help you plan how much

## 2021-06-21 ENCOUNTER — MYC MEDICAL ADVICE (OUTPATIENT)
Dept: OBGYN | Facility: OTHER | Age: 26
End: 2021-06-21

## 2021-06-22 DIAGNOSIS — N93.9 ABNORMAL UTERINE BLEEDING: Primary | ICD-10-CM

## 2021-06-22 NOTE — TELEPHONE ENCOUNTER
No this is not normal.   I am not sure what is going on at this point either.  I would recommend checking a pregnancy test and pelvic US for further evaluation to make sure not miscarriage or there is anything else going on causing abnormal bleeding.  Then would recommend we do nothing this cycle and if next period is normal then OK to start trying again and doing Femara at that time.   If you have continued heavy  bleeding soaking pad every hour then would go to ED.

## 2021-07-02 ENCOUNTER — HOSPITAL ENCOUNTER (OUTPATIENT)
Dept: ULTRASOUND IMAGING | Facility: HOSPITAL | Age: 26
End: 2021-07-02
Attending: OBSTETRICS & GYNECOLOGY
Payer: COMMERCIAL

## 2021-07-02 DIAGNOSIS — N93.9 ABNORMAL UTERINE BLEEDING: ICD-10-CM

## 2021-07-02 DIAGNOSIS — N97.9 FEMALE INFERTILITY: ICD-10-CM

## 2021-07-02 LAB — PROGEST SERPL-MCNC: 21.1 NG/ML

## 2021-07-02 PROCEDURE — 76830 TRANSVAGINAL US NON-OB: CPT

## 2021-07-02 PROCEDURE — 84144 ASSAY OF PROGESTERONE: CPT | Performed by: OBSTETRICS & GYNECOLOGY

## 2021-07-02 PROCEDURE — 36415 COLL VENOUS BLD VENIPUNCTURE: CPT | Performed by: OBSTETRICS & GYNECOLOGY

## 2021-07-06 DIAGNOSIS — N83.202 LEFT OVARIAN CYST: Primary | ICD-10-CM

## 2021-08-02 ENCOUNTER — HOSPITAL ENCOUNTER (OUTPATIENT)
Dept: ULTRASOUND IMAGING | Facility: HOSPITAL | Age: 26
Discharge: HOME | End: 2021-08-02
Attending: OBSTETRICS & GYNECOLOGY | Admitting: OBSTETRICS & GYNECOLOGY
Payer: COMMERCIAL

## 2021-08-02 DIAGNOSIS — N83.202 LEFT OVARIAN CYST: ICD-10-CM

## 2021-08-02 PROCEDURE — 76830 TRANSVAGINAL US NON-OB: CPT

## 2021-08-03 DIAGNOSIS — N97.9 FEMALE INFERTILITY: ICD-10-CM

## 2021-08-03 RX ORDER — LETROZOLE 2.5 MG/1
7.5 TABLET, FILM COATED ORAL DAILY
Qty: 15 TABLET | Refills: 2 | Status: CANCELLED | OUTPATIENT
Start: 2021-08-03

## 2021-08-03 RX ORDER — LETROZOLE 2.5 MG/1
2.5 TABLET, FILM COATED ORAL DAILY
Qty: 5 TABLET | Refills: 2 | Status: SHIPPED | OUTPATIENT
Start: 2021-08-03 | End: 2021-09-02

## 2021-08-10 ENCOUNTER — MYC MEDICAL ADVICE (OUTPATIENT)
Dept: OBGYN | Facility: OTHER | Age: 26
End: 2021-08-10

## 2021-08-10 DIAGNOSIS — Z32.01 PREGNANCY TEST POSITIVE: Primary | ICD-10-CM

## 2021-08-11 ENCOUNTER — LAB (OUTPATIENT)
Dept: LAB | Facility: OTHER | Age: 26
End: 2021-08-11
Payer: COMMERCIAL

## 2021-08-11 DIAGNOSIS — Z32.01 PREGNANCY TEST POSITIVE: ICD-10-CM

## 2021-08-11 LAB — HCG UR QL: NEGATIVE

## 2021-08-11 PROCEDURE — 81025 URINE PREGNANCY TEST: CPT

## 2021-08-16 ENCOUNTER — LAB (OUTPATIENT)
Dept: LAB | Facility: OTHER | Age: 26
End: 2021-08-16
Payer: COMMERCIAL

## 2021-08-16 DIAGNOSIS — N92.6 MISSED MENSES: Primary | ICD-10-CM

## 2021-08-16 DIAGNOSIS — N92.6 MISSED MENSES: ICD-10-CM

## 2021-08-16 LAB — B-HCG SERPL-ACNC: 901 IU/L (ref 0–5)

## 2021-08-16 PROCEDURE — 84702 CHORIONIC GONADOTROPIN TEST: CPT

## 2021-08-16 PROCEDURE — 36415 COLL VENOUS BLD VENIPUNCTURE: CPT

## 2021-08-17 DIAGNOSIS — N97.9 FEMALE INFERTILITY: Primary | ICD-10-CM

## 2021-08-17 DIAGNOSIS — Z32.01 PREGNANCY TEST POSITIVE: Primary | ICD-10-CM

## 2021-08-17 RX ORDER — PROGESTERONE 200 MG/1
200 CAPSULE ORAL DAILY
Qty: 60 CAPSULE | Refills: 2 | Status: SHIPPED | OUTPATIENT
Start: 2021-08-17 | End: 2021-11-09

## 2021-08-24 ENCOUNTER — LAB (OUTPATIENT)
Dept: LAB | Facility: OTHER | Age: 26
End: 2021-08-24
Payer: COMMERCIAL

## 2021-08-24 DIAGNOSIS — Z32.01 PREGNANCY TEST POSITIVE: ICD-10-CM

## 2021-08-24 LAB — B-HCG SERPL-ACNC: 9581 IU/L (ref 0–5)

## 2021-08-24 PROCEDURE — 84144 ASSAY OF PROGESTERONE: CPT

## 2021-08-24 PROCEDURE — 36415 COLL VENOUS BLD VENIPUNCTURE: CPT

## 2021-08-24 PROCEDURE — 84702 CHORIONIC GONADOTROPIN TEST: CPT

## 2021-08-25 LAB — PROGEST SERPL-MCNC: 46.6 NG/ML

## 2021-09-02 ENCOUNTER — PRENATAL OFFICE VISIT (OUTPATIENT)
Dept: OBGYN | Facility: OTHER | Age: 26
End: 2021-09-02
Attending: OBSTETRICS & GYNECOLOGY
Payer: COMMERCIAL

## 2021-09-02 VITALS
SYSTOLIC BLOOD PRESSURE: 127 MMHG | BODY MASS INDEX: 50.32 KG/M2 | OXYGEN SATURATION: 99 % | HEIGHT: 63 IN | DIASTOLIC BLOOD PRESSURE: 74 MMHG | WEIGHT: 284 LBS | HEART RATE: 87 BPM

## 2021-09-02 DIAGNOSIS — Z34.91 PREGNANCY WITH UNCERTAIN DATES IN FIRST TRIMESTER: Primary | ICD-10-CM

## 2021-09-02 PROCEDURE — 99207 PR FIRST OB VISIT: CPT | Performed by: OBSTETRICS & GYNECOLOGY

## 2021-09-02 PROCEDURE — 76817 TRANSVAGINAL US OBSTETRIC: CPT | Performed by: OBSTETRICS & GYNECOLOGY

## 2021-09-02 ASSESSMENT — PAIN SCALES - GENERAL: PAINLEVEL: NO PAIN (0)

## 2021-09-02 ASSESSMENT — MIFFLIN-ST. JEOR: SCORE: 1988.38

## 2021-09-02 NOTE — PROGRESS NOTES
"  HPI:  Kamila Greer is a 26 year old female  Patient's last menstrual period was 07/10/2021. at Unknown, Estimated Date of Delivery: Data Unavailable.  She denies vaginal bleeding and abdominal pain.  + nausea.  Occasional vomiting.  No other c/o.  H/o infertility but conceived spontaneously.     Past Medical History:   Diagnosis Date     Acute recurrent maxillary sinusitis 2015     Bariatric surgery status 2016     Conductive hearing loss of combined types 1997     Crouzon's syndrome 2011     Diarrhea 2011     Hearing loss 2011     Mixed hearing loss 2015     Multiple allergies 2011     Other iron deficiency anemia 2016     Otomycosis of right ear 09/10/2012     Vasovagal near syncope 2016       Past Surgical History:   Procedure Laterality Date     ADENOIDECTOMY       GI SURGERY  -    VSG      MOUTH SURGERY  2011    wisdom teeth extracted     PET placement       sagital suture release  1994     SEPTOPLASTY, TURBINOPLASTY, COMBINED N/A 2017    Procedure: COMBINED SEPTOPLASTY, TURBINOPLASTY;  SEPTOPLASTY, TURBINATE REDUCTION;  Surgeon: Gina Casillas MD;  Location: [facility] OR     TONSILLECTOMY         Allergies: Hydrocodone-acetaminophen, Morphine, Sulfa drugs, Topamax, and Topiramate     ROS:   Denies fever, wt loss   Neg /GI other than per HPI      EXAM:  Blood pressure 127/74, pulse 87, height 1.594 m (5' 2.75\"), weight 128.8 kg (284 lb), last menstrual period 07/10/2021, SpO2 99 %, not currently breastfeeding.   BMI= Body mass index is 50.71 kg/m .  General - pleasant female in no acute distress.  Abdomen - soft, nontender, nondistended, no hepatosplenomegaly.  Pelvic - EG: normal adult female, BUS: within normal limits,Rectovaginal - deferred.    US:    Transvaginal:Yes  Yolk sac present:Yes  CRL:  9mm  FCA present:Yes  EGA 7w 0d  JO:21          ASSESSMENT/PLAN:  Viable IUP with discordant dates. H/o infertility.  Will " schedule for f/u US 2-3 weeks to determine continued viability and dates.     1st Trimester precautions and testing discussed.    Patient has my card and phone number to call prn if problems in interim.    Pietro Palma MD

## 2021-09-02 NOTE — NURSING NOTE
"Chief Complaint   Patient presents with     Prenatal Care     pre new LMP- 7/10/21 Ga- 7w 5d       Initial /74 (BP Location: Left arm, Cuff Size: Adult Large)   Pulse 87   Ht 1.594 m (5' 2.75\")   Wt 128.8 kg (284 lb)   LMP 07/10/2021   SpO2 99%   BMI 50.71 kg/m   Estimated body mass index is 50.71 kg/m  as calculated from the following:    Height as of this encounter: 1.594 m (5' 2.75\").    Weight as of this encounter: 128.8 kg (284 lb).  Medication Reconciliation: complete  Yessica Morrissey LPN  "

## 2021-09-06 ENCOUNTER — MYC MEDICAL ADVICE (OUTPATIENT)
Dept: OBGYN | Facility: OTHER | Age: 26
End: 2021-09-06

## 2021-09-06 DIAGNOSIS — O20.0 THREATENED ABORTION: Primary | ICD-10-CM

## 2021-09-08 ENCOUNTER — APPOINTMENT (OUTPATIENT)
Dept: ULTRASOUND IMAGING | Facility: HOSPITAL | Age: 26
End: 2021-09-08
Attending: STUDENT IN AN ORGANIZED HEALTH CARE EDUCATION/TRAINING PROGRAM
Payer: COMMERCIAL

## 2021-09-08 ENCOUNTER — HOSPITAL ENCOUNTER (EMERGENCY)
Facility: HOSPITAL | Age: 26
Discharge: HOME | End: 2021-09-08
Attending: STUDENT IN AN ORGANIZED HEALTH CARE EDUCATION/TRAINING PROGRAM | Admitting: STUDENT IN AN ORGANIZED HEALTH CARE EDUCATION/TRAINING PROGRAM
Payer: COMMERCIAL

## 2021-09-08 ENCOUNTER — HOSPITAL ENCOUNTER (OUTPATIENT)
Dept: ULTRASOUND IMAGING | Facility: HOSPITAL | Age: 26
Discharge: HOME | End: 2021-09-08
Attending: OBSTETRICS & GYNECOLOGY | Admitting: OBSTETRICS & GYNECOLOGY
Payer: COMMERCIAL

## 2021-09-08 VITALS
SYSTOLIC BLOOD PRESSURE: 141 MMHG | HEART RATE: 112 BPM | OXYGEN SATURATION: 98 % | DIASTOLIC BLOOD PRESSURE: 86 MMHG | TEMPERATURE: 99.6 F | RESPIRATION RATE: 16 BRPM

## 2021-09-08 DIAGNOSIS — O20.0 THREATENED ABORTION: ICD-10-CM

## 2021-09-08 DIAGNOSIS — N30.01 ACUTE CYSTITIS WITH HEMATURIA: ICD-10-CM

## 2021-09-08 DIAGNOSIS — N93.9 VAGINAL BLEEDING: ICD-10-CM

## 2021-09-08 LAB
ABO/RH(D): NORMAL
ALBUMIN UR-MCNC: 50 MG/DL
ANTIBODY SCREEN: NEGATIVE
APPEARANCE UR: ABNORMAL
B-HCG SERPL-ACNC: ABNORMAL IU/L (ref 0–5)
BILIRUB UR QL STRIP: NEGATIVE
COLOR UR AUTO: ABNORMAL
ERYTHROCYTE [DISTWIDTH] IN BLOOD BY AUTOMATED COUNT: 20.6 % (ref 10–15)
GLUCOSE UR STRIP-MCNC: NEGATIVE MG/DL
HCT VFR BLD AUTO: 31.4 % (ref 35–47)
HGB BLD-MCNC: 9.2 G/DL (ref 11.7–15.7)
HGB UR QL STRIP: ABNORMAL
KETONES UR STRIP-MCNC: 10 MG/DL
LEUKOCYTE ESTERASE UR QL STRIP: ABNORMAL
MCH RBC QN AUTO: 20 PG (ref 26.5–33)
MCHC RBC AUTO-ENTMCNC: 29.3 G/DL (ref 31.5–36.5)
MCV RBC AUTO: 68 FL (ref 78–100)
MUCOUS THREADS #/AREA URNS LPF: PRESENT /LPF
NITRATE UR QL: NEGATIVE
PH UR STRIP: 6 [PH] (ref 4.7–8)
PLATELET # BLD AUTO: 477 10E3/UL (ref 150–450)
RBC # BLD AUTO: 4.6 10E6/UL (ref 3.8–5.2)
RBC URINE: 130 /HPF
SP GR UR STRIP: 1.03 (ref 1–1.03)
SPECIMEN EXPIRATION DATE: NORMAL
SQUAMOUS EPITHELIAL: 13 /HPF
UROBILINOGEN UR STRIP-MCNC: NORMAL MG/DL
WBC # BLD AUTO: 7.7 10E3/UL (ref 4–11)
WBC URINE: 20 /HPF

## 2021-09-08 PROCEDURE — 76815 OB US LIMITED FETUS(S): CPT | Mod: 26 | Performed by: STUDENT IN AN ORGANIZED HEALTH CARE EDUCATION/TRAINING PROGRAM

## 2021-09-08 PROCEDURE — 99284 EMERGENCY DEPT VISIT MOD MDM: CPT | Mod: 25

## 2021-09-08 PROCEDURE — 76815 OB US LIMITED FETUS(S): CPT | Mod: TC | Performed by: STUDENT IN AN ORGANIZED HEALTH CARE EDUCATION/TRAINING PROGRAM

## 2021-09-08 PROCEDURE — 84702 CHORIONIC GONADOTROPIN TEST: CPT | Performed by: STUDENT IN AN ORGANIZED HEALTH CARE EDUCATION/TRAINING PROGRAM

## 2021-09-08 PROCEDURE — 85027 COMPLETE CBC AUTOMATED: CPT | Performed by: STUDENT IN AN ORGANIZED HEALTH CARE EDUCATION/TRAINING PROGRAM

## 2021-09-08 PROCEDURE — 76801 OB US < 14 WKS SINGLE FETUS: CPT

## 2021-09-08 PROCEDURE — 86900 BLOOD TYPING SEROLOGIC ABO: CPT | Performed by: STUDENT IN AN ORGANIZED HEALTH CARE EDUCATION/TRAINING PROGRAM

## 2021-09-08 PROCEDURE — 81001 URINALYSIS AUTO W/SCOPE: CPT | Performed by: STUDENT IN AN ORGANIZED HEALTH CARE EDUCATION/TRAINING PROGRAM

## 2021-09-08 PROCEDURE — 99284 EMERGENCY DEPT VISIT MOD MDM: CPT | Mod: 25 | Performed by: STUDENT IN AN ORGANIZED HEALTH CARE EDUCATION/TRAINING PROGRAM

## 2021-09-08 PROCEDURE — 36415 COLL VENOUS BLD VENIPUNCTURE: CPT | Performed by: STUDENT IN AN ORGANIZED HEALTH CARE EDUCATION/TRAINING PROGRAM

## 2021-09-08 RX ORDER — CEPHALEXIN 500 MG/1
1000 CAPSULE ORAL 3 TIMES DAILY
Qty: 60 CAPSULE | Refills: 0 | Status: SHIPPED | OUTPATIENT
Start: 2021-09-08 | End: 2021-09-18

## 2021-09-08 NOTE — DISCHARGE INSTRUCTIONS
Return to the emergency department for worsening of your symptoms or any new concerning symptoms, especially if you are feeling lightheaded or have significant bleeding this feeling more than a pad an hour.  Please follow-up with your OB within 48 to 72 hours.  Call to schedule an appointment.  You may have to have repeat testing of your labs for pregnancy at that time.  I am giving you antibiotic medications for a urinary tract infection.  Please discuss this with your OB when you see them.  Please complete the entire regimen.

## 2021-09-08 NOTE — ED NOTES
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 Had 7 week US on Thursday and on Saturday had some brief vaginal bleeding. Reports today she has been bleeding bright red. Some small clots. Denies any pain or cramping at rest and with palpation. Dr Palma is her OB. Reports this will be her longest pregnancy. No other complaints of dizziness. Reports she has used 1 pad and has not saturated.  at bedside.

## 2021-09-08 NOTE — ED NOTES
Patient given discharge instructions to take keflex as directed and for full course. Follow up with OB.    Patient verbalized understanding. Patient condition stable upon discharge.

## 2021-09-08 NOTE — ED PROVIDER NOTES
History     Chief Complaint   Patient presents with     Vaginal Bleeding     HPI  Kamila Greer is a 26 year old female with history of GERD, morbid obesity, PCOS presents to the emergency department today stating that she has had 2 previous miscarriages in the past, has a 7-week intrauterine gestation pregnancy based on ultrasound here with reports of bleeding.  She had initially had bleeding 2 days ago which stopped on its own.  She has had today some bright red bleeding with some small clots, denies dizziness or lightheadedness.  She has never brought a gestation to term.  She is only used 1 single pad and has not saturated that pad.  No complaints of abdominal pain.  Denies dysuria or frequency.    Allergies:  Allergies   Allergen Reactions     Hydrocodone-Acetaminophen Nausea and Vomiting     Morphine Nausea and Vomiting     Sulfa Drugs Hives     SULFA (SULFONAMIDE ANTIBIOTICS)     Topamax Diarrhea     Topiramate Diarrhea       Problem List:    Patient Active Problem List    Diagnosis Date Noted     Female infertility 05/05/2021     Priority: Medium     With anovulation.  Femara ovulation induction.   Nml SA, HSG.  Borderline FSH (11)Repeat FSH/referral if no pregnancy in 6 months.        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  Kamila aM has no plan or code status on file. Discussed available resources and provided with information. Confirmed code status reflects current choices pending further ACP  discussions.  Confirmed/documented legally designated decision makers.  Added by Radha Case             Abdominal pannus 12/07/2015     Priority: Medium     Dermatitis fungal 12/07/2015     Priority: Medium     Mixed hearing loss 12/04/2015     Priority: Medium     Acute recurrent maxillary sinusitis 12/04/2015     Priority: Medium     Vitamin D deficiency 05/12/2014     Priority: Medium     GERD (gastroesophageal reflux disease) 06/10/2013     Priority: Medium     Hearing loss 06/10/2013     Priority: Medium        Past Medical History:    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     Diarrhea 01/01/2011     Hearing loss 01/01/2011     Mixed hearing loss 12/4/2015     Multiple allergies 01/01/2011     Other iron deficiency anemia 7/18/2016     Otomycosis of right ear 09/10/2012     Vasovagal near syncope 7/18/2016       Past Surgical History:    Past Surgical History:   Procedure Laterality Date     ADENOIDECTOMY       GI SURGERY      VSG      MOUTH SURGERY  8/2011    wisdom teeth extracted     PET placement       sagital suture release  6/1994     SEPTOPLASTY, TURBINOPLASTY, COMBINED N/A 6/13/2017    Procedure: COMBINED SEPTOPLASTY, TURBINOPLASTY;  SEPTOPLASTY, TURBINATE REDUCTION;  Surgeon: Gina Casillas MD;  Location: [facility] OR     TONSILLECTOMY         Family History:    Family History   Problem Relation Age of Onset     Hypertension Mother      Migraines Mother      Diabetes Paternal Grandfather      Hypertension Maternal Uncle      Hypertension Maternal Grandfather      Hypertension Maternal Grandmother      Depression Sister      Unknown/Adopted Paternal Grandmother        Social History:  Marital Status:   [2]  Social History     Tobacco Use     Smoking status: Never Smoker     Smokeless tobacco: Never Used    Substance Use Topics     Alcohol use: No     Drug use: No        Medications:    cephALEXin (KEFLEX) 500 MG capsule  Prenatal Multivit-Min-Fe-FA (PRE- PO)  progesterone (PROMETRIUM) 200 MG capsule  albuterol (PROAIR HFA/PROVENTIL HFA/VENTOLIN HFA) 108 (90 Base) MCG/ACT inhaler  Calcium-Vitamin D-Vitamin K (VIACTIV PO)  pantoprazole (PROTONIX) 40 MG EC tablet          Review of Systems  A complete review of systems was performed and is otherwise negative.     Physical Exam   BP: 141/86  Pulse: 112  Temp: 99.6  F (37.6  C)  Resp: 16  SpO2: 98 %      Physical Exam  Constitutional: Alert and conversant. NAD   HENT: NCAT   Eyes: Normal pupils   Neck: supple   CV: Normal rate, regular rhythm, no murmur   Pulmonary/Chest: Non-labored respirations, clear to auscultation bilaterally   Abdominal: Soft, non-tender, non-distended   MSK: VAZQUEZ.   Neuro: Alert and appropriate   Skin: Warm and dry. No diaphoresis. No rashes on exposed skin    Psych: Appropriate mood and affect     ED Course     ED Course as of Sep 08 1603   Wed Sep 08, 2021   1601 26-year-old female presenting here with pregnancy and vaginal bleeding.  Vitals notable for mild tachycardia but minimal bleeding.      1602 Hemoglobin is at the patient's baseline.  No complaints of lightheadedness or dizziness to suggest symptomatic hemorrhagic shock   Hemoglobin(!): 9.2   1602 HCG Quantitative Serum(!): 63,753   1602 Patient has what appears to be a urinary tract infection, will treat.  Keflex ordered.   WBC Urine(!): 20   1602 Patient has a known IUP, low concern for ectopic.  She was not an IVF patient.  Ultrasound performed by myself demonstrates a fetus with a heart rate of 150, IUP      1603 Patient appears well, and appropriate for further outpatient management, discharged condition with all questions answered return precautions given, recommended OB follow-up within 48 to 72 hours.        Procedures       Results for orders placed or performed during the  hospital encounter of 09/08/21 (from the past 24 hour(s))   CBC with platelets   Result Value Ref Range    WBC Count 7.7 4.0 - 11.0 10e3/uL    RBC Count 4.60 3.80 - 5.20 10e6/uL    Hemoglobin 9.2 (L) 11.7 - 15.7 g/dL    Hematocrit 31.4 (L) 35.0 - 47.0 %    MCV 68 (L) 78 - 100 fL    MCH 20.0 (L) 26.5 - 33.0 pg    MCHC 29.3 (L) 31.5 - 36.5 g/dL    RDW 20.6 (H) 10.0 - 15.0 %    Platelet Count 477 (H) 150 - 450 10e3/uL   ABO/Rh type and screen    Narrative    The following orders were created for panel order ABO/Rh type and screen.  Procedure                               Abnormality         Status                     ---------                               -----------         ------                     Adult Type and Screen[[id number]]                            Edited Result - FINAL        Please view results for these tests on the individual orders.   HCG quantitative pregnancy   Result Value Ref Range    hCG Quantitative 63,753 (H) 0 - 5 IU/L   Adult Type and Screen   Result Value Ref Range    ABO/RH(D) O POS     Antibody Screen Negative Negative    SPECIMEN EXPIRATION DATE [id number]    POC US OB TRANSABDOMINAL LIMITED    Impression    [facility] Procedure Note     Limited Bedside ED Pelvic Ultrasound (PREGNANT PATIENT):    PROCEDURE: PERFORMED BY: Dr. Carl Crews MD  INDICATIONS/SYMPTOM: Vaginal Bleeding  PROBE: Low frequency convex probe  Type of US Study: Transabdominal Exam  BODY LOCATION: Pelvis  FINDINGS: Fetal heart rate: Present and counted at 150 bpm.  INTERPRETATION: Normal pelvic ultrasound with intrauterine pregnancy present. FHR was 150 with noted fetal activity.   UA with Microscopic reflex to Culture    Specimen: Urine, Midstream   Result Value Ref Range    Color Urine Orange (A) Colorless, Straw, Light Yellow, Yellow    Appearance Urine Slightly Cloudy (A) Clear    Glucose Urine Negative Negative mg/dL    Bilirubin Urine Negative Negative    Ketones Urine 10  (A) Negative mg/dL    Specific  Gravity Urine 1.026 1.003 - 1.035    Blood Urine Large (A) Negative    pH Urine 6.0 4.7 - 8.0    Protein Albumin Urine 50  (A) Negative mg/dL    Urobilinogen Urine Normal Normal, 2.0 mg/dL    Nitrite Urine Negative Negative    Leukocyte Esterase Urine Moderate (A) Negative    Mucus Urine Present (A) None Seen /LPF    RBC Urine 130 (H) <=2 /HPF    WBC Urine 20 (H) <=5 /HPF    Squamous Epithelials Urine 13 (H) <=1 /HPF       Medications - No data to display    Assessments & Plan (with Medical Decision Making)     I have reviewed the nursing notes.    I have reviewed the findings, diagnosis, plan and need for follow up with the patient.  Discharge Medication List as of 9/8/2021 11:43 AM      START taking these medications    Details   cephALEXin (KEFLEX) 500 MG capsule Take 2 capsules (1,000 mg) by mouth 3 times daily for 10 days, Disp-60 capsule, R-0, E-Prescribe             Final diagnoses:   Vaginal bleeding   Acute cystitis with hematuria       9/8/2021   [facility]     Carl Crews MD  09/08/21 6921

## 2021-09-08 NOTE — ED TRIAGE NOTES
"C/o vaginal bleeding during pregnancy. Currently 8 weeks pregnant. Initially had bleeding on 9/4/21 for ~45 minutes, which resolved. Has seen OB and states she had a \"normal 7 week ultrasound.\" Bleeding has now started again. Denies cramping, back pain, or dizziness. Hx of multiple miscarriages.   "

## 2021-09-17 ENCOUNTER — PRENATAL OFFICE VISIT (OUTPATIENT)
Dept: OBGYN | Facility: OTHER | Age: 26
End: 2021-09-17
Attending: OBSTETRICS & GYNECOLOGY
Payer: COMMERCIAL

## 2021-09-17 VITALS
SYSTOLIC BLOOD PRESSURE: 130 MMHG | OXYGEN SATURATION: 96 % | BODY MASS INDEX: 49.96 KG/M2 | DIASTOLIC BLOOD PRESSURE: 78 MMHG | WEIGHT: 282 LBS | HEART RATE: 75 BPM | HEIGHT: 63 IN

## 2021-09-17 DIAGNOSIS — O20.0 THREATENED ABORTION: Primary | ICD-10-CM

## 2021-09-17 DIAGNOSIS — Q75.1 CROUZON CRANIOFACIAL DYSOSTOSIS: ICD-10-CM

## 2021-09-17 DIAGNOSIS — E66.01 MORBID OBESITY (H): ICD-10-CM

## 2021-09-17 PROCEDURE — 99207 PR COMPLICATED OB VISIT: CPT | Mod: 25 | Performed by: OBSTETRICS & GYNECOLOGY

## 2021-09-17 PROCEDURE — 76817 TRANSVAGINAL US OBSTETRIC: CPT | Performed by: OBSTETRICS & GYNECOLOGY

## 2021-09-17 ASSESSMENT — MIFFLIN-ST. JEOR: SCORE: 1983.27

## 2021-09-17 ASSESSMENT — PAIN SCALES - GENERAL: PAINLEVEL: NO PAIN (0)

## 2021-09-17 NOTE — PROGRESS NOTES
"  HPI:  Kamila Greer is a 26 year old female  Patient's last menstrual period was 07/10/2021. at 9w1d, Estimated Date of Delivery: 2022.  She denies vaginal bleeding and abdominal pain.   + nausea. No other c/o.  See my prior eval.  H/o recurrent SAB/infertility.  On progesterone.   1st trimester GEE.  On modified activity/pelvic rest.     Past Medical History:   Diagnosis Date     Acute recurrent maxillary sinusitis 2015     Bariatric surgery status 2016     Conductive hearing loss of combined types 1997     Crouzon's syndrome 2011     Diarrhea 2011     Hearing loss 2011     Mixed hearing loss 2015     Multiple allergies 2011     Other iron deficiency anemia 2016     Otomycosis of right ear 09/10/2012     Vasovagal near syncope 2016       Past Surgical History:   Procedure Laterality Date     ADENOIDECTOMY       GI SURGERY  -    VSG      MOUTH SURGERY  2011    wisdom teeth extracted     PET placement       sagital suture release  1994     SEPTOPLASTY, TURBINOPLASTY, COMBINED N/A 2017    Procedure: COMBINED SEPTOPLASTY, TURBINOPLASTY;  SEPTOPLASTY, TURBINATE REDUCTION;  Surgeon: Gina Casillas MD;  Location: [facility] OR     TONSILLECTOMY         Allergies: Hydrocodone-acetaminophen, Morphine, Sulfa drugs, Topamax, and Topiramate     ROS:   Denies fever, wt loss   Neg /GI other than per HPI      EXAM:  Blood pressure 130/78, pulse 75, height 1.6 m (5' 3\"), weight 127.9 kg (282 lb), last menstrual period 07/10/2021, SpO2 96 %, not currently breastfeeding.   BMI= Body mass index is 49.95 kg/m .  General - pleasant female in no acute distress.  Abdomen - soft, nontender, nondistended, no hepatosplenomegaly.  Pelvic - EG: normal adult female, BUS: within normal limits,Rectovaginal - deferred.    US:    Transvaginal:Yes  Yolk sac present:Yes  FCA present:Yes  EGA 9w 0d  JO:cwd  Fluid collection/GEE visualized " 1.7x1.3cm          ASSESSMENT/PLAN:  Viable IUP with concordant dates    GEE:  Stable.  Continue modified activity/pelvic rest/progesterone through 1st trimester.    Crouzon syndrom:  Autosomal dominant.  Genetic counseling discussed.     Obesity: Plan early GTT, baby asa for preeclampsia prevention starting in second trimester in delivery in [place].     1st Trimester precautions and testing discussed.  New OB Labs ordered and exam scheduled.  Aneuploidy testing options discussed.  Patient has my card and phone number to call prn if problems in interim.    Pietro Palma MD

## 2021-09-17 NOTE — NURSING NOTE
"Chief Complaint   Patient presents with     Prenatal Care     repeat pre new JO- 4/21/22 GA- 9w 1d       Initial /78 (BP Location: Left arm, Cuff Size: Adult Large)   Pulse 75   Ht 1.6 m (5' 3\")   Wt 127.9 kg (282 lb)   LMP 07/10/2021   SpO2 96%   BMI 49.95 kg/m   Estimated body mass index is 49.95 kg/m  as calculated from the following:    Height as of this encounter: 1.6 m (5' 3\").    Weight as of this encounter: 127.9 kg (282 lb).  Medication Reconciliation: complete  Yessica Morrissey LPN  "

## 2021-10-02 ENCOUNTER — HEALTH MAINTENANCE LETTER (OUTPATIENT)
Age: 26
End: 2021-10-02

## 2021-10-07 ENCOUNTER — LAB (OUTPATIENT)
Dept: LAB | Facility: OTHER | Age: 26
End: 2021-10-07
Payer: COMMERCIAL

## 2021-10-07 DIAGNOSIS — O20.0 THREATENED ABORTION: ICD-10-CM

## 2021-10-07 LAB
ABO/RH(D): NORMAL
ALBUMIN UR-MCNC: ABNORMAL MG/DL
AMPHETAMINES UR QL: NOT DETECTED
ANTIBODY SCREEN: NEGATIVE
APPEARANCE UR: CLEAR
BACTERIA #/AREA URNS HPF: ABNORMAL /HPF
BARBITURATES UR QL SCN: NOT DETECTED
BENZODIAZ UR QL SCN: NOT DETECTED
BILIRUB UR QL STRIP: ABNORMAL
BUPRENORPHINE UR QL: NOT DETECTED
CANNABINOIDS UR QL: NOT DETECTED
COCAINE UR QL SCN: NOT DETECTED
COLOR UR AUTO: YELLOW
D-METHAMPHET UR QL: NOT DETECTED
ERYTHROCYTE [DISTWIDTH] IN BLOOD BY AUTOMATED COUNT: 18.6 % (ref 10–15)
GLUCOSE UR STRIP-MCNC: NEGATIVE MG/DL
HCT VFR BLD AUTO: 31.2 % (ref 35–47)
HGB BLD-MCNC: 9.4 G/DL (ref 11.7–15.7)
HGB UR QL STRIP: ABNORMAL
KETONES UR STRIP-MCNC: 15 MG/DL
LEUKOCYTE ESTERASE UR QL STRIP: NEGATIVE
MCH RBC QN AUTO: 20.3 PG (ref 26.5–33)
MCHC RBC AUTO-ENTMCNC: 30.1 G/DL (ref 31.5–36.5)
MCV RBC AUTO: 67 FL (ref 78–100)
METHADONE UR QL SCN: NOT DETECTED
NITRATE UR QL: NEGATIVE
OPIATES UR QL SCN: NOT DETECTED
OXYCODONE UR QL SCN: NOT DETECTED
PCP UR QL SCN: NOT DETECTED
PH UR STRIP: 6.5 [PH] (ref 5–7)
PLATELET # BLD AUTO: 464 10E3/UL (ref 150–450)
PROPOXYPH UR QL: NOT DETECTED
RBC # BLD AUTO: 4.63 10E6/UL (ref 3.8–5.2)
RBC #/AREA URNS AUTO: ABNORMAL /HPF
SP GR UR STRIP: 1.02 (ref 1–1.03)
SPECIMEN EXPIRATION DATE: NORMAL
SQUAMOUS #/AREA URNS AUTO: ABNORMAL /LPF
TRICYCLICS UR QL SCN: NOT DETECTED
UROBILINOGEN UR STRIP-ACNC: 1 E.U./DL
WBC # BLD AUTO: 6.4 10E3/UL (ref 4–11)
WBC #/AREA URNS AUTO: ABNORMAL /HPF

## 2021-10-07 PROCEDURE — 86762 RUBELLA ANTIBODY: CPT

## 2021-10-07 PROCEDURE — 86780 TREPONEMA PALLIDUM: CPT

## 2021-10-07 PROCEDURE — 85027 COMPLETE CBC AUTOMATED: CPT

## 2021-10-07 PROCEDURE — 87086 URINE CULTURE/COLONY COUNT: CPT

## 2021-10-07 PROCEDURE — 36415 COLL VENOUS BLD VENIPUNCTURE: CPT

## 2021-10-07 PROCEDURE — 87340 HEPATITIS B SURFACE AG IA: CPT

## 2021-10-07 PROCEDURE — 86803 HEPATITIS C AB TEST: CPT

## 2021-10-07 PROCEDURE — 86901 BLOOD TYPING SEROLOGIC RH(D): CPT

## 2021-10-07 PROCEDURE — 86900 BLOOD TYPING SEROLOGIC ABO: CPT

## 2021-10-07 PROCEDURE — 80306 DRUG TEST PRSMV INSTRMNT: CPT

## 2021-10-07 PROCEDURE — 86850 RBC ANTIBODY SCREEN: CPT

## 2021-10-07 PROCEDURE — 82306 VITAMIN D 25 HYDROXY: CPT | Performed by: NURSE PRACTITIONER

## 2021-10-07 PROCEDURE — 87389 HIV-1 AG W/HIV-1&-2 AB AG IA: CPT

## 2021-10-08 ENCOUNTER — PRENATAL OFFICE VISIT (OUTPATIENT)
Dept: OBGYN | Facility: OTHER | Age: 26
End: 2021-10-08
Attending: NURSE PRACTITIONER
Payer: COMMERCIAL

## 2021-10-08 VITALS
OXYGEN SATURATION: 98 % | HEIGHT: 63 IN | BODY MASS INDEX: 49.96 KG/M2 | SYSTOLIC BLOOD PRESSURE: 133 MMHG | WEIGHT: 282 LBS | HEART RATE: 86 BPM | DIASTOLIC BLOOD PRESSURE: 74 MMHG

## 2021-10-08 DIAGNOSIS — O09.92 HIGH-RISK PREGNANCY IN SECOND TRIMESTER: Primary | ICD-10-CM

## 2021-10-08 DIAGNOSIS — O09.91 HIGH-RISK PREGNANCY IN FIRST TRIMESTER: ICD-10-CM

## 2021-10-08 DIAGNOSIS — E66.01 MORBID OBESITY (H): ICD-10-CM

## 2021-10-08 LAB
CLUE CELLS: NORMAL
FASTING STATUS PATIENT QL REPORTED: NO
GLUCOSE BLD-MCNC: 84 MG/DL (ref 70–99)
HBV SURFACE AG SERPL QL IA: NONREACTIVE
HCV AB SERPL QL IA: NONREACTIVE
HIV 1+2 AB+HIV1 P24 AG SERPL QL IA: NONREACTIVE
RUBV IGG SERPL QL IA: 2.17 INDEX
RUBV IGG SERPL QL IA: POSITIVE
T PALLIDUM AB SER QL: NONREACTIVE
TRICHOMONAS, WET PREP: NORMAL
WBC'S/HIGH POWER FIELD, WET PREP: NORMAL
YEAST, WET PREP: NORMAL

## 2021-10-08 PROCEDURE — 82947 ASSAY GLUCOSE BLOOD QUANT: CPT | Performed by: NURSE PRACTITIONER

## 2021-10-08 PROCEDURE — 87491 CHLMYD TRACH DNA AMP PROBE: CPT | Performed by: NURSE PRACTITIONER

## 2021-10-08 PROCEDURE — 87210 SMEAR WET MOUNT SALINE/INK: CPT | Performed by: NURSE PRACTITIONER

## 2021-10-08 PROCEDURE — 36415 COLL VENOUS BLD VENIPUNCTURE: CPT | Performed by: NURSE PRACTITIONER

## 2021-10-08 PROCEDURE — 87591 N.GONORRHOEAE DNA AMP PROB: CPT | Performed by: NURSE PRACTITIONER

## 2021-10-08 PROCEDURE — 99207 PR PRENATAL VISIT: CPT | Performed by: NURSE PRACTITIONER

## 2021-10-08 ASSESSMENT — MIFFLIN-ST. JEOR: SCORE: 1983.27

## 2021-10-08 ASSESSMENT — PAIN SCALES - GENERAL: PAINLEVEL: NO PAIN (0)

## 2021-10-08 NOTE — NURSING NOTE
"Chief Complaint   Patient presents with     Prenatal Care     New OB 12 weeks 1 day       Initial /74 (BP Location: Left arm, Patient Position: Sitting, Cuff Size: Adult Large)   Pulse 86   Ht 1.6 m (5' 3\")   Wt 127.9 kg (282 lb)   LMP 07/10/2021   SpO2 98%   BMI 49.95 kg/m   Estimated body mass index is 49.95 kg/m  as calculated from the following:    Height as of this encounter: 1.6 m (5' 3\").    Weight as of this encounter: 127.9 kg (282 lb).  Medication Reconciliation: complete     Jenise Rivera, OSVALDO    "

## 2021-10-08 NOTE — PROGRESS NOTES
Have you had or do you currently have:    - Diabetes? N  - Hypertension? N  - Heart disease, mitral valve prolapse, or rheumatic fever?  N  - An autoimmune disease such as lupus or rheumatoid arthritis?  N  - Kidney disease, urinary tract infection?  N  - Epilepsy, seizures, or spells?  N  - Migraine headaches?  N  - Any other neurological problems?  N  - Have you ever been treated for depression?  N  - Are you having problems with crying spells or loss of self-esteem?  N  - Have you ever required psychiatric care?  N  - Have you ever had hepatitis, liver disease, or jaundice?  N  - Have you ever been treated for blood clots in your veins, deep vein thrombosis, inflammation in the veins, thrombosis, phelbitis, pulmonary embolism or varicosities?  N  - Have you had excessive bleeding after surgery or dental work?  N  - Do you bleed more than other women after a cut or scratch?  N  - Do you have a history or anemia?  Y  - Have you ever had thyroid problems or take thyroid medication?  N  - Do you have any endocrine problems?  N  - Have you every been in a major accident or suffered serious trauma?  N  - Within the last year, has anyone hit, slapped, kicked, or otherwise hurt you?  N  - In the last year, has anyone forced you to have sex when you didn't want to?  N  - Have you every received a blood transfusion?  Y  - Would you refuse a blood transfusion if a doctor judged it to be medically necessary?  N  - Does anyone in your home smoke? N  - Do you use tobacco products?  N  - Do you drink beer, wine, or hard liquor?  N  - Do you use any of the following: marijuana, speed, cocaine, heroin, hallucinogens, or other drugs?  N  - Is your blood type RH negative?  N  - Have you ever had asthma?  Y  - Have you ever had tuberculosis?  N  - Do you have any allergies to drugs or over-the-counter medications?  Y  - Allergies: dust mites, aspartame, ethanol, venlafaxine hydrochloride, sertraline?  N  - Have you had any breast  problems?  N  - Have you ever breast-fed?  N  - Have you had any gynecological surgical procedures such as cervical conization, LEEP, laser treatment, cryosurgery of the cervix, or a dilatation and curettage, etc?  N  - Have you ever had any other surgical procedures?  N  - Have you ever had any anesthetic complications?  N  - Have you ever had an abnormal pap smear?  N  - Do you have a history of abnormalities of the uterus? N  - Did your mother take GUILLERMO or any other hormones when she was pregnant with you?  N  - Did it take you more than one year to become pregnant?  Y  - Have you ever been evaluated or treated for infertility?  Y  - Is there a history of medical problems in your family, which you feel might adversely affect your health or pregnancy?  N  - Do you have any other problems we have not asked about which you feel may be important to this pregnancy?  N    Symptoms since last menstrual period  - Do you currently have any of the following symptoms: abdominal pain, blood in the stool or urine, chest pain, shortness of breath, coughing or vomiting up blood, you heart is racing or skipping beats, nausea and vomiting, pain on urination, or vaginal discharge or bleeding?  N    Genetic screening  Has the patient, baby's father, or anyone in either family had:  - Thalassemia (Italian, Greek, Mediterranean, or  background only) and an MCV result less than 80?  N  - Neural tube defect such as meningomyelocele, spina bifida, or anencephaly?  N  - Congential heart defect?  N  - Down's syndrome?  N  - Devendra-Sachs disease ( Mandaeism, Cajun, Maori-Chicot)?  N  - Sickle cell disease or trait () ?  N  - Hemophilia or other inherited problems of blood?  N  - Muscular dystrophy?  N  - Cystic fibrosis?  N  - Darlington's chorea?  N  - Mental retardation/autism?  N   If yes, was the person tested for Fragile X?  N  - Any other inherited genetic or chromosomal disorder?  Y, CROUZON'S SYNDROME  - Maternal metabolic  disorder (e.g. insulin- dependent diabetes, PKU)?  N  - A child with birth defects not listed above?  N  - Recurrent pregnancy loss, or a stillbirth?  N  - Has the patient had any medications/street drugs/alcohol since her last menstrual period?  N  - Does the patient or baby's father have any other genetic risk?  N    Infection history  - Have you ever been treated for tuberculosis?  N  - Have you every had a positive skin test for tuberculosis?  N  - Do you live with someone who has tuberculosis?  N  - Have you ever been exposed to tuberculosis?  N  - Do you have genital herpes?  N  - Does your partner have genital herpes?  N  - Have you had a rash or viral illness since your last period?  N  - Have you ever had gonorrhea, chlamydia, syphilis, venereal warts, trichomoniasis, pelvic inflammatory disease, or any other sexually transmitted disease?  N  - Have you had chicken pox?  N  - Have you been vaccinated against chicken pox?  Y  - Have you had any other infectious diseases?  N

## 2021-10-09 LAB
BACTERIA UR CULT: NORMAL
C TRACH DNA SPEC QL PROBE+SIG AMP: NEGATIVE
N GONORRHOEA DNA SPEC QL NAA+PROBE: NEGATIVE

## 2021-10-12 DIAGNOSIS — D50.8 OTHER IRON DEFICIENCY ANEMIA: Primary | ICD-10-CM

## 2021-10-12 PROBLEM — O09.91 HIGH-RISK PREGNANCY IN FIRST TRIMESTER: Status: ACTIVE | Noted: 2021-10-12

## 2021-10-12 ASSESSMENT — PATIENT HEALTH QUESTIONNAIRE - PHQ9: SUM OF ALL RESPONSES TO PHQ QUESTIONS 1-9: 0

## 2021-10-12 NOTE — PROGRESS NOTES
"  HPI:  Kamila Greer is a 26 year old female Patient's last menstrual period was 07/10/2021. at 12w5d, Estimated Date of Delivery: Apr 21, 2022.  She denies vaginal bleeding, nausea , vomiting and abdominal pain. No other c/o.    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     Diarrhea 01/01/2011     Hearing loss 01/01/2011     Mixed hearing loss 12/4/2015     Multiple allergies 01/01/2011     Other iron deficiency anemia 7/18/2016     Otomycosis of right ear 09/10/2012     Vasovagal near syncope 7/18/2016       Past Surgical History:   Procedure Laterality Date     ADENOIDECTOMY       GI SURGERY      [facility]      MOUTH SURGERY  8/2011    wisdom teeth extracted     PET placement       sagital suture release  6/1994     SEPTOPLASTY, TURBINOPLASTY, COMBINED N/A 6/13/2017    Procedure: COMBINED SEPTOPLASTY, TURBINOPLASTY;  SEPTOPLASTY, TURBINATE REDUCTION;  Surgeon: Gina Casillas MD;  Location: [facility]     TONSILLECTOMY         Allergies: Hydrocodone-acetaminophen, Morphine, Sulfa drugs, Topamax, and Topiramate     EXAM:  Blood pressure 133/74, pulse 86, height 1.6 m (5' 3\"), weight 127.9 kg (282 lb), last menstrual period 07/10/2021, SpO2 98 %, not currently breastfeeding.   BMI= Body mass index is 49.95 kg/m .  General - pleasant female in no acute distress.  Neck - supple without lymphadenopathy or thyromegaly.  Lungs - clear to auscultation bilaterally.  Heart - regular rate and rhythm without murmur.  Abdomen - soft, nontender, nondistended, no hepatosplenomegaly.  Pelvic - EG: normal adult female, BUS: within normal limits, Vagina: well rugated, no discharge, Cervix: no lesions or CMT, closed/long Uterus: gravid, consistant with dates, mobile, Adnexae: no masses or tenderness.  Rectovaginal - deferred.  Musculoskeletal - no " gross deformities.  Neurological - normal strength, sensation, and mental status.    Doptones were +    ASSESSMENT/PLAN:  (O09.92) High-risk pregnancy in second trimester  (primary encounter diagnosis)  Comment: new ob physical and teaching completed  Plan: GC/Chlamydia by PCR - [facility], Wet prep, [facility]         Non-Invasive Prenatal Screening, Vitamin D         Deficiency, Glucose, CANCELED: Vitamin B12        Return in 4 weeks.     (E66.01) Morbid obesity ([facility])  Comment:   Plan: Glucose            (O09.91) High-risk pregnancy in first trimester  Comment:   Plan: return in 4 weeks.       Weight gain and exercise during pregnancy was discussed at today's visit.  The patient will return to clinic in 4 weeks for continued prenatal care.

## 2021-10-12 NOTE — PATIENT INSTRUCTIONS
Patient Education     Exercise During Pregnancy  Regular exercise can help you adapt to the changes your body is going through during pregnancy. Exercising may help you relax, and it gets you ready for labor and delivery. Talk with your healthcare provider about the kinds of activities you can do. Then go ahead and enjoy them.     Get started  Even if you didn t exercise before pregnancy, it's not too late to start. Choose an activity that you like and that fits your lifestyle. Begin slowly and build up a little at a time. Check with your healthcare provider before starting any exercise program. The following tips may help you get started:     Choose a time and place to exercise each day.    Wear loose-fitting clothes and comfortable athletic shoes.    Stretch before and after you exercise. (Be sure to stretch slowly and to hold stretches for  30 to  40 seconds.)  Be active  Unless your healthcare provider says otherwise, try to exercise for 30 minutes or more most days of the week:     Overall conditioning, such as swimming, bicycling, or walking, is especially beneficial.    Aerobics and exercises that increase your pulse rate help condition your body and strengthen your heart. Ask about special prenatal aerobics classes.  Exercise safely  These tips will help you have a safe, healthy workout:    Stay cool. Stop exercising if you feel overheated.    Slow down if you re out of breath. If you can t talk during exercise, lower the intensity of the workout.    Watch the intensity of your workout. Only do moderate-intensity (not strenuous) exercise.    Stay off your back. Lying on your back can decrease blood flow to your baby.    Drink water before, during, and after your workout.    Eat  300 extra calories a day. A light snack before and after you exercise will help keep your energy up.    Don't do activities requiring balancing skills later in pregnancy.  Do Kegel exercises  Kegel exercises strengthen the pelvic  floor muscles used in childbirth. These muscles are the same ones used to stop the flow of urine. Do Kegel exercises daily:     Squeeze your pelvic floor muscles for a count of 3.    Relax, then squeeze again.    Repeat 10 to 15 times in a row at least 3 times a day.    You can do Kegel exercises anytime and anywhere.  Keep walking  No matter what other exercise you do, try to walk whenever you can:    If you re working all day, take a lunchtime walk in the park with a friend.    When you shop, park away from the store entrance and walk the extra distance.    Take the stairs instead of the elevator.  When to stop exercising and call your healthcare provider  Call your healthcare provider right away if you have:    Shortness of breath before starting exercise    Vaginal bleeding    Dizziness or feeling faint    Chest pain    Headache    Decreased fetal movement     contractions     Muscle weakness    Calf pain or swelling    Fluid leaking from the vagina  [employer] last reviewed this educational content on 2020-2021 [employer]. All rights reserved. This information is not intended as a substitute for professional medical care. Always follow your healthcare professional's instructions.           Patient Education     Healthy Eating Habits During Pregnancy    It s important to develop healthy eating habits while you are pregnant, for you as well as for your baby. Here are some ways to stay healthy.  Aim for a healthy weight  A slow, steady rate of weight gain is often best. After the first trimester, you may gain about a pound (0.45 kg) a week. If you were overweight before pregnancy, you need to gain fewer pounds (kilograms). Your healthcare provider can give you a healthy weight goal for your pregnancy.  Don t diet  Now is not the time to diet. You may not get enough of the nutrients you and your baby need. Instead, learn how to be a healthy eater. Start by doing it for your baby.  Soon, you may do it for yourself.  Vitamins and supplements  Talk with your healthcare provider about taking these and other prenatal vitamins and supplements.    Iron makes the extra blood you need now.    Calcium and vitamin D help build and keep strong bones.    Folic acid helps prevent certain birth defects.    Iodine helps the thyroid work right.    Some vitamins may not be safe to take. Your healthcare provider will tell you which ones to avoid.  Fluids  Drink at least 8 to 10 cups of fluid daily. Your baby needs fluids. Fluids also decrease constipation, flush out toxins and waste, limit swelling, and help prevent bladder infections. Water is best. Other good choices are:    Water or seltzer water with a slice of lemon or lime (These can also help ease an upset stomach.)    Clear soups that are low in salt    Low-fat or fat-free milk, soy or rice milk with calcium added    Popsicles or gelatin  Things to avoid  Some things might harm your growing baby. Don t eat or drink:    Alcohol    Unpasteurized dairy foods and juices    Raw or undercooked meat, poultry, fish, or eggs    Unwashed fruits and vegetables    Prepared meats, like deli meats or hot dogs, unless heated until steaming hot    Fish that are high in mercury, like shark, swordfish, shelby mackerel, tilefish, and albacore tuna  Things to limit  Ask your healthcare provider whether it s safe to eat or drink:    Caffeine    Artificial sweeteners    Organ meats    Certain types of fish    Fish and shellfish that contain mercury in lower amounts, like shrimp, canned light tuna, salmon, pollock, and catfish  [employer] last reviewed this educational content on 2/1/2018 2000-2021 [employer]. All rights reserved. This information is not intended as a substitute for professional medical care. Always follow your healthcare professional's instructions.           Patient Education     Established Pregnancy, Normal Symptoms    You are pregnant and  are having symptoms that worry you. During pregnancy, it s normal to have many kinds of symptoms. Here is a list of common symptoms that happen during pregnancy.   Circulation changes    Bleeding gums    Headaches    Nosebleeds    Mild blurriness of vision, especially with contact lenses    Stuffy nose    Dizziness and fainting    Extra saliva    Skin color changes on your face    Stuffy nose    Swollen hands, legs, and feet    Swollen leg veins    Breast and skin changes    Darkening of nipples    Yellow or white discharge from the nipples    Sore breasts and nipples    Swollen breasts    Dry, itchy skin    Skin color changes on your face    Muscle and joint changes    Back, hip, or thigh pain    Leg cramps that come and go    Numbness and tingling in your hands and fingers    Urinary and bowel changes    Constipation    Feeling of pressure on your bladder and stomach    Need to urinate often    Gas and bloating    Heartburn    Anal itching, swelling, and bleeding (hemorrhoids)    Leaking urine    Mild pressure or cramping in your belly    Nausea and vomiting throughout the day or night (morning sickness)    Swollen belly    Clear to white vaginal discharge    Mood and thinking changes    Forgetfulness    Less interest in sex    Mood swings    Tiredness    Trouble sleeping    Home care  Here is information that may help relieve some common pregnancy symptoms.   Sore and swollen breasts    Wear a support bra that fits properly.  Nausea and indigestion    Eat smaller meals or snacks more often.    Eat bland foods, such as bananas, crackers, or rice.    Stay away from spicy, fatty, or fried foods.    Stay away from alcohol, caffeine, and tobacco.    Don t lie down right after eating.    Raise your head with pillows when you lie down.    Eat foods or beverages that have ginger. If you drink ginger ale, be sure to make sure it has real ginger and not just ginger flavoring.  Leg swelling and varicose veins     Wear  elastic support hose. Put your feet up as often as possible.  Constipation    Eat more fresh fruits and vegetables and more whole grains. Drink more clear liquids.  Joint and muscle pain    Avoid heavy lifting.    Pick things up by bending at your knees, not at your waist.    Use acetaminophen for joint and muscle pain. Don't use aspirin, ibuprofen, or naproxen.  Mouth and nose dryness or bleeding     Drink more liquids. Use a vaporizer or humidifier in your bedroom.  Don t take medicines or use remedies that your healthcare provider hasn t approved. If you have symptoms that are severe or sudden, call your healthcare provider.   Call 911  Call 911 if any of these occur:     New chest, arm, shoulder, neck, or upper back pain    Trouble breathing    Severe belly pain or very heavy bleeding    Severe lightheadedness, passing out, or fainting    Rapid heart rate    Confusion or trouble waking up  When to seek medical advice  Call your healthcare provider right away if any of these occur:     Burning or pain when you urinate    Depression or severe anxiety    Desire to eat or drink nonfood items such as paper, dirt, or cleaning products    Diarrhea that lasts more than 24 hours    Fast heartbeat or heart palpitations    Fever of 100.4 F (38 C) or higher, or as directed by your healthcare provider    You can t keep fluids down for 6 hours without vomiting    Severe or ongoing vomiting    Little or no urine    Major vision changes    Moderate or severe belly pain    Severe back pain    Severe constipation    Severe cramping or swelling in a leg, especially if it s just on one side    Severe headache    Sudden swelling of your face, hands, feet, or ankles    Vaginal bleeding    Very itchy skin that doesn t get better  [employer] last reviewed this educational content on 11/1/2019 2000-2021 [employer]. All rights reserved. This information is not intended as a substitute for professional medical care. Always  follow your healthcare professional's instructions.           Patient Education     Comfort Tips During Pregnancy  Pregnancy can bring discomfort of different kinds. Below are tips for ways to feel better. Talk with your healthcare provider before using pain-relieving medicine at any time during your pregnancy.     First trimester tips  Easing nausea    Get up slowly. Eat a few unsalted crackers before you get out of bed.    Avoid smells that bother you.    Eat small, bland, low-fat, high-protein meals at frequent intervals.    Sip on water, weak tea, or clear soft drinks, like ginger ale. Eat ice chips.    Try taking vitamin B6.  Coping with fatigue    Take catnaps when you can.    Get regular exercise.    Accept help from others.    Practice good sleep habits, like going to bed and getting up at the same time each day. Use your bed only for sleep and sex.  Calming mood swings    Talk about your feelings with others, including other mothers.    Limit sugar, chocolate, and caffeine.    Eat a healthy diet. Don t skip meals.    Get regular exercise.  Soothing headaches    Get fresh air and exercise.    Relax and get enough rest.    Check with your healthcare provider before taking any pain medicines.    Second trimester tips    To limit ankle swelling, sit with your feet raised or wear support hose.    If you have pain in your groin and stomach (round ligament pain), don't make sudden twisting movements with your body.    For leg cramps, flexing your foot often brings immediate relief. Also try massaging your calf in long, downward strokes, or stretching your legs before going to bed. Get enough exercise and wear shoes with flexible soles. Eat foods rich in calcium.    Third trimester tips  Reducing heartburn    Eat small, light meals throughout the day rather than 3 large ones.    Sleep with your upper body raised 6 inches. Don t lie down until 2 hours after you eat.    Don't eat greasy, fried, or spicy  foods.    Don't have citrus fruits or juices.  Treating constipation    Eat foods high in fiber, such as whole-grain foods, and fresh fruit and vegetables).    Drink plenty of water.    Get regular exercise.    Ask about your healthcare provider about medicines that have docusate or psyllium.  Taking care of your breasts    Don't use harsh soaps or alcohol, which can make your skin too dry.    Wear nursing bras. They provide more support than regular bras and can be used after pregnancy if you breastfeed.  Getting a good night s sleep     Take a warm shower before bed.    Sleep on a firm mattress.    Lie on your side with 1 leg crossed over the other.    Use pillows to support your arms, legs, and belly.    [employer] last reviewed this educational content on 8/1/2020 2000-2021 [employer]. All rights reserved. This information is not intended as a substitute for professional medical care. Always follow your healthcare professional's instructions.

## 2021-10-13 LAB — DEPRECATED CALCIDIOL+CALCIFEROL SERPL-MC: 28 UG/L (ref 20–75)

## 2021-10-18 LAB — SCANNED LAB RESULT: NORMAL

## 2021-10-20 ENCOUNTER — TELEPHONE (OUTPATIENT)
Dept: FAMILY MEDICINE | Facility: OTHER | Age: 26
End: 2021-10-20

## 2021-10-20 NOTE — TELEPHONE ENCOUNTER
12:16 PM    Reason for Call: Phone Call    This message came into MyChart request. Please advise pt.       Message    Appointment Request From: Kamila Greer      With Provider: Myrna Oneil CNP [[facility]]      Preferred Date Range: 10/21/2021 - 10/22/2021      Preferred Times: Any Time      Reason for visit: Request an Appointment      Comments:   Considering switching prenatal care doctors and she comes highly recommended. I would like to meet to discuss options

## 2021-10-21 NOTE — TELEPHONE ENCOUNTER
Please call her and see if she is thinking about [facility]. I do not do OB. Dr. Kim may also be an option for her.

## 2021-11-05 ENCOUNTER — PRENATAL OFFICE VISIT (OUTPATIENT)
Dept: OBGYN | Facility: OTHER | Age: 26
End: 2021-11-05
Attending: NURSE PRACTITIONER
Payer: COMMERCIAL

## 2021-11-05 ENCOUNTER — LAB (OUTPATIENT)
Dept: LAB | Facility: OTHER | Age: 26
End: 2021-11-05
Attending: NURSE PRACTITIONER
Payer: COMMERCIAL

## 2021-11-05 VITALS
WEIGHT: 278.5 LBS | SYSTOLIC BLOOD PRESSURE: 127 MMHG | BODY MASS INDEX: 49.35 KG/M2 | OXYGEN SATURATION: 98 % | DIASTOLIC BLOOD PRESSURE: 68 MMHG | HEART RATE: 116 BPM | HEIGHT: 63 IN

## 2021-11-05 DIAGNOSIS — E66.01 MORBID OBESITY (H): ICD-10-CM

## 2021-11-05 DIAGNOSIS — D50.8 OTHER IRON DEFICIENCY ANEMIA: ICD-10-CM

## 2021-11-05 DIAGNOSIS — O09.92 HIGH-RISK PREGNANCY IN SECOND TRIMESTER: Primary | ICD-10-CM

## 2021-11-05 DIAGNOSIS — Q75.1 CROUZON CRANIOFACIAL DYSOSTOSIS: ICD-10-CM

## 2021-11-05 PROCEDURE — 99207 PR PRENATAL VISIT: CPT | Performed by: NURSE PRACTITIONER

## 2021-11-05 PROCEDURE — 82607 VITAMIN B-12: CPT

## 2021-11-05 PROCEDURE — 36415 COLL VENOUS BLD VENIPUNCTURE: CPT

## 2021-11-05 ASSESSMENT — MIFFLIN-ST. JEOR: SCORE: 1967.4

## 2021-11-05 ASSESSMENT — PAIN SCALES - GENERAL: PAINLEVEL: NO PAIN (0)

## 2021-11-05 NOTE — PATIENT INSTRUCTIONS
Patient Education     Influenza (Flu) and Pregnancy  Influenza (the flu) is an infection of the respiratory tract. The tract is made up of your mouth, nose, and lungs, and the tubes between them. The flu can make a pregnant woman very ill. This is because changes that occur during pregnancy to the immune system, heart, and lungs make a pregnant woman more likely to develop flu complications. These include sinus infections and serious lung infections such as bronchitis and pneumonia. The flu can cause high fevers, which can cause birth defects and other complications in the developing fetus. In rare cases, the flu can lead to miscarriage or even death of the mother. This sheet tells you more about the flu, what to do if you get the flu, and what you can do to prevent infection.   Who is at risk for the flu?  Anyone can get the flu. But you are more likely to catch the flu if you:     Are often around young children    Work in a healthcare setting where you may be exposed to flu germs    Live or work with someone who has the flu    Haven t had the annual flu shot  How does the flu spread?  The flu is caused by a virus. The virus spreads through the air in droplets when someone who has the flu coughs, sneezes, laughs, or talks. You can become infected when you breathe in the virus directly. You can also become infected when you touch a surface where the droplets have landed and then touch your eyes, nose, or mouth. Touching used tissues, or sharing utensils, drinking glasses, or a toothbrush with an infected person can expose you to the flu virus, too.   What are the symptoms of the flu?  Flu symptoms tend to start quickly and may last a few days to a few weeks. They include:     Fever that's usually higher than 100.4 F ( 38 C) and chills    Sore throat and headache    Dry cough    Runny nose    Tiredness and weakness    Body and muscle aches  If you are pregnant and have flu-like symptoms   Call your healthcare  provider right away. Follow any instructions they give you. You may be asked to get tested to confirm that you have the flu.   Your healthcare provider may prescribe medicines called antivirals. These medicines must be taken within 2 days of when your symptoms started. In some cases, your healthcare provider may not wait for test results to come back before starting you on antivirals.   These medicines work by stopping the flu virus from reproducing in your body. This gives your body s immune system a chance to fight the virus. After taking the medicine, your symptoms may be milder and you may recover quicker than without the medicine. The medicine may also prevent serious complications such as pneumonia. Antivirals are considered safe during pregnancy, but talk with your healthcare provider if you have any concerns.   Easing flu symptoms    Drink lots of fluids such as water, juice, and warm soup to prevent dehydration. A good rule is to drink enough so that you urinate your normal amount. Feeling dizzy or lightheaded most likely means you need to drink more fluid.    Get plenty of rest.    If you aren't hungry, eat smaller meals more often during the day to make sure you get enough calories.    If you don't have a fever, put warm compresses on your forehead or sinuses to ease congestion.    If you need medicines to ease symptoms, ask your healthcare provider which ones are safe for you to take.    Call your healthcare provider if you become short of breath.    Preventing the flu     Washing your hands often with soap and water can help keep you from catching the flu virus.       Get vaccinated. One of the best ways to prevent the flu is to get a flu shot. Pregnant women can safely get a flu shot. But pregnant women should not get the nasal spray vaccine for the flu. This is a live-virus vaccine and may be harmful to the baby.    Wash your hands often. Frequent handwashing is a proven way to prevent infection.  Carry an alcohol-based hand gel that has at least 60% alcohol. Use it when you don t have access to soap and water.    Clean items you use often with disinfectant wipes. This includes phones, computer keyboards, and toys.    Stay away from crowds and children as much as possible while you are pregnant. Stay away from anyone who has the flu.  Tips for good handwashing   Handwashing is one of the best ways to prevent many common infections. Follow these steps for more effective handwashing:     Use warm water and plenty of soap. Work up a good lather.    Clean the whole hand, under your nails, between your fingers, and up the wrists.    Wash for at least 20 seconds. Don t just wipe--scrub well.    Rinse, letting the water run down your fingers, not up your wrists.    Dry your hands well. Use a paper towel to turn off the faucet and open the door.  Using alcohol-based hand gel  Alcohol-based hand gels are also a good choice for cleaning your hands. Use them when you don t have access to soap and water. Follow these steps:     Squeeze about a tablespoon of gel into the palm of one hand.    Rub your hands together briskly, cleaning the backs of your hands, the palms, between your fingers, and up the wrists.    Rub until the gel is gone and your hands are completely dry.  [employer] last reviewed this educational content on 8/1/2020 2000-2021 [employer]. All rights reserved. This information is not intended as a substitute for professional medical care. Always follow your healthcare professional's instructions.           Patient Education     Tailor Sit, Trunk Turn for Back Pain During Pregnancy  Before trying these exercises, talk to your healthcare provider to make sure they are safe for you. Ask your healthcare provider how many times to do each exercise.      Tailor sit Trunk turns   Tailor sit  This exercise makes your thigh, pelvic, and hip muscles more flexible.  1. Sit on the floor with the soles  of your feet together. Your back should be straight.  2. Gently lean forward until you feel a mild stretch in your hip and thigh muscles. Your back should remain straight. Don t push down on your legs with your hands.  3. Hold and count to 5, then relax.  Trunk turns  This helps make your trunk (from your shoulders to your hips) more flexible.  1. Sit on the floor with your legs crossed. Your back should be straight.  2. Put your left hand on your right knee. Rest your right hand on the floor to support yourself and help you balance.  3. Slowly twist right. To do this, turn your head, shoulders, and chest as far right as you comfortably can. Keep your hips, knees, and feet in place.  4. Hold for 5 counts. Then change sides and slowly twist left.  [email or web address] last reviewed this educational content on 2/1/2018 2000-2021 [employer]. All rights reserved. This information is not intended as a substitute for professional medical care. Always follow your healthcare professional's instructions.           Patient Education     Relieving Back Pain During Pregnancy: Wall Stretch, Body Bend  Before trying these exercises, talk to your healthcare provider to make sure they are safe for you. Ask your healthcare provider how many times to do each exercise.      Wall stretch Body bend   Wall stretch  This strengthens and loosens the muscles in your upper back:  4. Lean against a wall with a firm pillow or rolled towel under your shoulder blades. Your feet should be about 12 inches from the wall and shoulder-width apart. Point your chin down.  5. Breathe in. Push your shoulders, neck, and head against the wall. You will feel a stretch in your shoulders.  6. Hold for 5 counts. Then breathe out, and relax your shoulders and neck.  Body bend  This strengthens your back and buttocks muscles:  5. Stand with your legs shoulder-width apart. Put your hands on your upper thighs and bend your knees slightly.  6. Slowly bend  forward at the hips. Push your hips back and keep your shoulders up. Make sure your back is straight. You ll feel a stretch in your upper thighs. You ll also feel your back muscles holding you in position.  7. Hold for 5 counts, then straighten.  [email or web address] last reviewed this educational content on 2/1/2018 2000-2021 [employer]. All rights reserved. This information is not intended as a substitute for professional medical care. Always follow your healthcare professional's instructions.           Patient Education     Pregnancy: Planning Your Exercise Routine    While you re pregnant, an exercise routine helps both your mind and your body feel good. It tones your muscles and makes them stronger. It also gives you and your baby more oxygen.  The right exercise for you  Overall conditioning is best for you and your baby. Try walking, swimming, or riding a stationary bike. Always warm up, cool down, and drink enough fluids. Keep a snack close by in case your blood sugar gets low. Discuss exercise choices with your healthcare provider. Talk about the following:    If you already exercise, find out how to adapt your routine while you re pregnant. Keep the intensity of the exercise moderate.    Ask if there are any local prenatal exercise classes, like yoga or water aerobics. Find out which prenatal exercise videos are good choices.    If you were not exercising before your pregnancy, find out the best way to start. Now is not the time to begin a new workout on your own. Start slowly. Listen to your body.    Ask which forms of exercise you should avoid. These may include risky activities like hot yoga, horseback riding, scuba diving, skiing, skating, and contact sports.  Pelvic tilts  These help strengthen your stomach muscles and low back. You can do pelvic tilts instead of sit-ups.    Do this exercise on your hands and knees.    Relax the back of your neck. Pull your stomach in until your low back  flattens.    Hold for 30 seconds. Release. Repeat 10 times. Do this twice a day.  Kegel exercises  Kegel exercises strengthen the pelvic muscles. Doing Kegels daily helps prepare these muscles for delivery. Kegels also help ease your recovery. You exercise these muscles by tightening, holding, then relaxing them. To do 1 type of Kegel exercise, contract as if you were stopping your urine stream (but do it when you re not urinating). Hold for 10 seconds, then repeat 10 times, a few times a day.  Tips to add activity  Here are some tips to follow:    Park the car farther from a store and walk.    If you can, do errands on foot instead of driving.    Walk across the office to talk to someone in person instead of calling.    While waiting for appointments, go up and down stairs or around the block.   Tips to stay active  Here are some tips to follow:    Maintain your routine. But exercise less intensely if you feel tired.    Base your workout on how you feel, not your heart rate. Heart rates aren t a good way to measure effort during pregnancy.    Avoid exercising on your back after week 16.   What are the warning signs that I should stop exercising?  Stop exercising and call your healthcare provider if you have any of these symptoms:    Vaginal bleeding     Dizziness or feeling faint     Increased shortness of breath     Chest pain     Headache     Muscle weakness     Calf (back of the leg) pain or swelling      Uterine contractions or pre-term labor     Decreased fetal movement     Fluid leaking (or gushing) from your vagina  [employer] last reviewed this educational content on 1/1/2018 2000-2021 [employer]. All rights reserved. This information is not intended as a substitute for professional medical care. Always follow your healthcare professional's instructions.

## 2021-11-05 NOTE — PROGRESS NOTES
Continues with n/v but slowly improving.  Denies bleeding or cramping.  Some RLP.  Reviewed NIPT today.  Discussed and ordered level II. Declines genetics.  Declines flu shot.  Return in 4 weeks.

## 2021-11-05 NOTE — NURSING NOTE
"Chief Complaint   Patient presents with     Prenatal Care     16 weeks 1 day       Initial /68 (BP Location: Right arm, Patient Position: Sitting, Cuff Size: Adult Large)   Pulse 116   Ht 1.6 m (5' 3\")   Wt 126.3 kg (278 lb 8 oz)   LMP 07/10/2021   SpO2 98%   BMI 49.33 kg/m   Estimated body mass index is 49.33 kg/m  as calculated from the following:    Height as of this encounter: 1.6 m (5' 3\").    Weight as of this encounter: 126.3 kg (278 lb 8 oz).  Medication Reconciliation: complete     Jenise Rivera, OSVALDO    "

## 2021-11-06 LAB — VIT B12 SERPL-MCNC: 426 PG/ML (ref 193–986)

## 2021-11-09 ENCOUNTER — OFFICE VISIT (OUTPATIENT)
Dept: FAMILY MEDICINE | Facility: OTHER | Age: 26
End: 2021-11-09
Attending: NURSE PRACTITIONER
Payer: COMMERCIAL

## 2021-11-09 VITALS
WEIGHT: 276 LBS | TEMPERATURE: 98.2 F | DIASTOLIC BLOOD PRESSURE: 72 MMHG | BODY MASS INDEX: 48.9 KG/M2 | OXYGEN SATURATION: 99 % | RESPIRATION RATE: 16 BRPM | SYSTOLIC BLOOD PRESSURE: 124 MMHG | HEART RATE: 92 BPM | HEIGHT: 63 IN

## 2021-11-09 DIAGNOSIS — R51.9 ACUTE NONINTRACTABLE HEADACHE, UNSPECIFIED HEADACHE TYPE: ICD-10-CM

## 2021-11-09 DIAGNOSIS — R11.2 NON-INTRACTABLE VOMITING WITH NAUSEA, UNSPECIFIED VOMITING TYPE: ICD-10-CM

## 2021-11-09 DIAGNOSIS — O09.91 HIGH-RISK PREGNANCY IN FIRST TRIMESTER: ICD-10-CM

## 2021-11-09 DIAGNOSIS — Z20.822 SUSPECTED 2019 NOVEL CORONAVIRUS INFECTION: Primary | ICD-10-CM

## 2021-11-09 LAB
ALBUMIN SERPL-MCNC: 2.8 G/DL (ref 3.4–5)
ALP SERPL-CCNC: 68 U/L (ref 40–150)
ALT SERPL W P-5'-P-CCNC: 10 U/L (ref 0–50)
ANION GAP SERPL CALCULATED.3IONS-SCNC: 6 MMOL/L (ref 3–14)
AST SERPL W P-5'-P-CCNC: 7 U/L (ref 0–45)
BASOPHILS # BLD AUTO: 0 10E3/UL (ref 0–0.2)
BASOPHILS NFR BLD AUTO: 0 %
BILIRUB SERPL-MCNC: 0.3 MG/DL (ref 0.2–1.3)
BUN SERPL-MCNC: 5 MG/DL (ref 7–30)
CALCIUM SERPL-MCNC: 9 MG/DL (ref 8.5–10.1)
CHLORIDE BLD-SCNC: 108 MMOL/L (ref 94–109)
CO2 SERPL-SCNC: 21 MMOL/L (ref 20–32)
CREAT SERPL-MCNC: 0.57 MG/DL (ref 0.52–1.04)
EOSINOPHIL # BLD AUTO: 0.1 10E3/UL (ref 0–0.7)
EOSINOPHIL NFR BLD AUTO: 1 %
ERYTHROCYTE [DISTWIDTH] IN BLOOD BY AUTOMATED COUNT: 17.6 % (ref 10–15)
GFR SERPL CREATININE-BSD FRML MDRD: >90 ML/MIN/1.73M2
GLUCOSE BLD-MCNC: 79 MG/DL (ref 70–99)
HCT VFR BLD AUTO: 31.9 % (ref 35–47)
HGB BLD-MCNC: 9.3 G/DL (ref 11.7–15.7)
IMM GRANULOCYTES # BLD: 0 10E3/UL
IMM GRANULOCYTES NFR BLD: 0 %
LYMPHOCYTES # BLD AUTO: 1.6 10E3/UL (ref 0.8–5.3)
LYMPHOCYTES NFR BLD AUTO: 21 %
MCH RBC QN AUTO: 20 PG (ref 26.5–33)
MCHC RBC AUTO-ENTMCNC: 29.2 G/DL (ref 31.5–36.5)
MCV RBC AUTO: 69 FL (ref 78–100)
MONOCYTES # BLD AUTO: 0.5 10E3/UL (ref 0–1.3)
MONOCYTES NFR BLD AUTO: 7 %
NEUTROPHILS # BLD AUTO: 5.2 10E3/UL (ref 1.6–8.3)
NEUTROPHILS NFR BLD AUTO: 71 %
NRBC # BLD AUTO: 0 10E3/UL
NRBC BLD AUTO-RTO: 0 /100
PLATELET # BLD AUTO: 451 10E3/UL (ref 150–450)
POTASSIUM BLD-SCNC: 4.3 MMOL/L (ref 3.4–5.3)
PROT SERPL-MCNC: 7.1 G/DL (ref 6.8–8.8)
RBC # BLD AUTO: 4.66 10E6/UL (ref 3.8–5.2)
SODIUM SERPL-SCNC: 135 MMOL/L (ref 133–144)
TSH SERPL DL<=0.005 MIU/L-ACNC: 0.65 MU/L (ref 0.4–4)
WBC # BLD AUTO: 7.3 10E3/UL (ref 4–11)

## 2021-11-09 PROCEDURE — 80050 GENERAL HEALTH PANEL: CPT | Performed by: NURSE PRACTITIONER

## 2021-11-09 PROCEDURE — U0005 INFEC AGEN DETEC AMPLI PROBE: HCPCS | Performed by: NURSE PRACTITIONER

## 2021-11-09 PROCEDURE — 36415 COLL VENOUS BLD VENIPUNCTURE: CPT | Performed by: NURSE PRACTITIONER

## 2021-11-09 PROCEDURE — U0003 INFECTIOUS AGENT DETECTION BY NUCLEIC ACID (DNA OR RNA); SEVERE ACUTE RESPIRATORY SYNDROME CORONAVIRUS 2 (SARS-COV-2) (CORONAVIRUS DISEASE [COVID-19]), AMPLIFIED PROBE TECHNIQUE, MAKING USE OF HIGH THROUGHPUT TECHNOLOGIES AS DESCRIBED BY CMS-2020-01-R: HCPCS | Performed by: NURSE PRACTITIONER

## 2021-11-09 PROCEDURE — 99213 OFFICE O/P EST LOW 20 MIN: CPT | Performed by: NURSE PRACTITIONER

## 2021-11-09 RX ORDER — PRENATAL VIT/IRON FUM/FOLIC AC 27MG-0.8MG
1 TABLET ORAL DAILY
Qty: 90 TABLET | Refills: 3 | Status: SHIPPED | OUTPATIENT
Start: 2021-11-09 | End: 2023-04-12

## 2021-11-09 ASSESSMENT — MIFFLIN-ST. JEOR: SCORE: 1956.06

## 2021-11-09 ASSESSMENT — PAIN SCALES - GENERAL: PAINLEVEL: NO PAIN (0)

## 2021-11-09 NOTE — PROGRESS NOTES
"  {PROVIDER CHARTING PREFERENCE:564519}    Subjective   Kamila is a 26 year old who presents for the following health issues     HPI     Concern - hyperemesis   Onset: progressing as pregnancy continues  Description: emesis  Intensity: moderate, severe  Progression of Symptoms:  worsening  Accompanying Signs & Symptoms: nausea  Previous history of similar problem: no  Precipitating factors:        Worsened by: multiple causes  Alleviating factors:        Improved by: nothing  Therapies tried and outcome: None    {additonal problems for provider to add (Optional):901754}    Review of Systems   {ROS COMP (Optional):920430}      Objective    /72 (BP Location: Right arm, Patient Position: Sitting, Cuff Size: Adult Large)   Pulse 92   Temp 98.2  F (36.8  C) (Tympanic)   Resp 16   Ht 1.6 m (5' 3\")   Wt 125.2 kg (276 lb)   LMP 07/10/2021   SpO2 99%   BMI 48.89 kg/m    Body mass index is 48.89 kg/m .  Physical Exam   {Exam List (Optional):068408}    {Diagnostic Test Results (Optional):443891}    {AMBULATORY ATTESTATION (Optional):758650}        "

## 2021-11-09 NOTE — PROGRESS NOTES
Assessment & Plan   1. Suspected 2019 novel coronavirus infection  New onset headache with increased nausea and vomiting and loose stools over the weekend. We discussed that this is more consistent with an acute viral illness at this stage in pregnancy that hyperemesis. She does not carry a diagnosis of hyperemesis and was reassured that it is much less common to develop this after the first trimester. I would like her follow up with her OB provider SAUL Peng to discussed if she meets criteria based on her history. If she does, then this can be addressed through that department. Patient will MyChart Lorraine.   - Symptomatic COVID-19 Virus (Coronavirus) by PCR Nasopharyngeal; Future  - Symptomatic COVID-19 Virus (Coronavirus) by PCR Nasopharyngeal    2. Acute nonintractable headache, unspecified headache type  No neurological abnormalities on exam. Reassurance given. May use acetaminophen for pain.   - CBC with platelets and differential; Future  - Comprehensive metabolic panel (BMP + Alb, Alk Phos, ALT, AST, Total. Bili, TP); Future  - CBC with platelets and differential  - Comprehensive metabolic panel (BMP + Alb, Alk Phos, ALT, AST, Total. Bili, TP)    3. Non-intractable vomiting with nausea, unspecified vomiting type  I am checking CMP given her reports of vomiting and loose stools. Well hydrated on exam today.   - CBC with platelets and differential; Future  - Comprehensive metabolic panel (BMP + Alb, Alk Phos, ALT, AST, Total. Bili, TP); Future  - CBC with platelets and differential  - Comprehensive metabolic panel (BMP + Alb, Alk Phos, ALT, AST, Total. Bili, TP)    4. High-risk pregnancy in first trimester  Follows with SAUL Peng and Dr. Palma.   - Prenatal Vit-Fe Fumarate-FA (PRENATAL MULTIVITAMIN W/IRON) 27-0.8 MG tablet; Take 1 tablet by mouth daily  Dispense: 90 tablet; Refill: 3  - TSH with free T4 reflex; Future  - TSH with free T4 reflex      I did reach out to Lorraine Kenny to discuss case  "and patient has MyCharted. No hx of hyperemesis.     Ordering of each unique test         BMI:   Estimated body mass index is 48.89 kg/m  as calculated from the following:    Height as of this encounter: 1.6 m (5' 3\").    Weight as of this encounter: 125.2 kg (276 lb).   Weight management plan: Discussed healthy diet and exercise guidelines    No follow-ups on file.    Myrna Oneil, [facility]    Subjective   Kamila is a 26 year old who presents for the following health issues     HPI   Kamila is concerned about having hyperemesis of pregnancy. She report both nausea, worse after eating with frequent vomiting. Vomits with every meal at times. \"I vomit on average about 2-3 times a day\". I have had to take the last few days of work the last few days and I am all out of PTO\"   Reviewed last visit with OB/GYN dept, SAUL Peng and it was noted that she was improving. Patient report that this was true but it has been a very hard weekend and symptoms are worse than 2 weeks ago.     Headache: Started about a week ago.   Cough: Yes- started this weekend but she attributes this to acid reflux. She does report having a hiatal hernia that was managed with dietary changes. No recent changes reported.   Does have some chills after vomiting but reports this is also consistent for her whole pregnancy.     Taking prenatals when she can but has a hard time taking them because of the N/V and cost. I have ordered these to see if copay is less than out of pocket.     Stools: Had not gone for about 4-5 days last week (Thursday-Sunday)  However, this weekend, she started having very soft stools. 2-3 stools in past 24 hours. No blood in stools.     Current Estimated Gestational Age:  16w5d    Review of Systems   Constitutional, HEENT, cardiovascular, pulmonary, gi and gu systems are negative, except as otherwise noted.      Objective    /72 (BP Location: Right arm, Patient Position: " "Sitting, Cuff Size: Adult Large)   Pulse 92   Temp 98.2  F (36.8  C) (Tympanic)   Resp 16   Ht 1.6 m (5' 3\")   Wt 125.2 kg (276 lb)   LMP 07/10/2021   SpO2 99%   BMI 48.89 kg/m    Body mass index is 48.89 kg/m .     Physical Exam   GENERAL: healthy, alert and no distress  EYES: Eyes grossly normal to inspection, PERRL and conjunctivae and sclerae normal  HENT: normal cephalic/atraumatic, ear canals and TM's normal, nose and mouth without ulcers or lesions, oropharynx clear, oral mucous membranes moist and sinuses: not tender  NECK: no adenopathy, no asymmetry, masses, or scars and thyroid normal to palpation  RESP: lungs clear to auscultation - no rales, rhonchi or wheezes  CV: regular rate and rhythm, normal S1 S2, no S3 or S4, no murmur, click or rub, no peripheral edema and peripheral pulses strong  ABDOMEN: soft, nontender, no hepatosplenomegaly, no masses and bowel sounds normal  PSYCH: mentation appears normal, affect normal/bright            "

## 2021-11-09 NOTE — NURSING NOTE
"Chief Complaint   Patient presents with     Vomiting       Initial /72 (BP Location: Right arm, Patient Position: Sitting, Cuff Size: Adult Large)   Pulse 92   Temp 98.2  F (36.8  C) (Tympanic)   Resp 16   Ht 1.6 m (5' 3\")   Wt 125.2 kg (276 lb)   LMP 07/10/2021   SpO2 99%   BMI 48.89 kg/m   Estimated body mass index is 48.89 kg/m  as calculated from the following:    Height as of this encounter: 1.6 m (5' 3\").    Weight as of this encounter: 125.2 kg (276 lb).  Medication Reconciliation: complete  Rachelle Burton LPN    "

## 2021-11-10 ENCOUNTER — PRENATAL OFFICE VISIT (OUTPATIENT)
Dept: FAMILY MEDICINE | Facility: OTHER | Age: 26
End: 2021-11-10
Attending: FAMILY MEDICINE
Payer: COMMERCIAL

## 2021-11-10 VITALS
WEIGHT: 274.5 LBS | RESPIRATION RATE: 18 BRPM | BODY MASS INDEX: 48.63 KG/M2 | SYSTOLIC BLOOD PRESSURE: 114 MMHG | DIASTOLIC BLOOD PRESSURE: 64 MMHG | TEMPERATURE: 99.6 F | HEART RATE: 114 BPM | OXYGEN SATURATION: 99 %

## 2021-11-10 DIAGNOSIS — O21.9 NAUSEA AND VOMITING IN PREGNANCY: Primary | ICD-10-CM

## 2021-11-10 LAB — SARS-COV-2 RNA RESP QL NAA+PROBE: NEGATIVE

## 2021-11-10 PROCEDURE — 250N000011 HC RX IP 250 OP 636: Performed by: FAMILY MEDICINE

## 2021-11-10 PROCEDURE — 99213 OFFICE O/P EST LOW 20 MIN: CPT | Performed by: FAMILY MEDICINE

## 2021-11-10 RX ORDER — ONDANSETRON 4 MG/1
4 TABLET, ORALLY DISINTEGRATING ORAL EVERY 8 HOURS PRN
Qty: 20 TABLET | Refills: 1 | Status: SHIPPED | OUTPATIENT
Start: 2021-11-10 | End: 2023-04-12

## 2021-11-10 RX ORDER — ONDANSETRON 4 MG/1
4 TABLET, ORALLY DISINTEGRATING ORAL EVERY 6 HOURS PRN
Status: DISCONTINUED | OUTPATIENT
Start: 2021-11-10 | End: 2021-12-07

## 2021-11-10 RX ADMIN — ONDANSETRON 4 MG: 4 TABLET, ORALLY DISINTEGRATING ORAL at 15:15

## 2021-11-10 ASSESSMENT — PAIN SCALES - GENERAL: PAINLEVEL: NO PAIN (0)

## 2021-11-10 NOTE — NURSING NOTE
Patient here for severe vomiting while preg.  Last 24 hour she has vomited 2-3 times.   Alejandra Nixon LPN ..........11/10/2021 2:51 PM

## 2021-11-10 NOTE — PROGRESS NOTES
"  SUBJECTIVE:   Kamila Greer is a 26 year old female who presents to clinic today for the following health issues:    Patient presents in pregnancy for management of nausea and vomiting.  She was seen yesterday closer to home, tested for Covid with additional labs done at that time.  That note indicates that the nausea vomiting was an acute issue, but the patient tells me today that this has been going on her entire pregnancy.  So far, she has not been prescribed any medications, has not tried anything over-the-counter.  She has put her noticing at work, because she does not feel like she can do her job due to the severity of her symptoms.    She has had an 8 pound weight loss.  She notes constant nausea with emesis \"all day and every day.\"  She is now feeling dizzy and lightheaded at times.  She is feeling very fatigued.  She has tried eating different foods without results.    She states that she has a history of gastric bypass 2013.  She has known hiatal hernia.  Chronic iron deficiency anemia at baseline.          OBJECTIVE:     /64 (BP Location: Right arm, Patient Position: Sitting, Cuff Size: Adult Regular)   Pulse 114   Temp 99.6  F (37.6  C) (Tympanic)   Resp 18   Wt 124.5 kg (274 lb 8 oz)   LMP 07/10/2021   SpO2 99%   BMI 48.63 kg/m    Body mass index is 48.63 kg/m .  Physical Exam  Constitutional:       Appearance: She is well-developed.   Eyes:      Conjunctiva/sclera: Conjunctivae normal.   Neck:      Thyroid: No thyromegaly.   Cardiovascular:      Rate and Rhythm: Normal rate and regular rhythm.      Heart sounds: Normal heart sounds. No murmur heard.      Pulmonary:      Effort: Pulmonary effort is normal. No respiratory distress.      Breath sounds: Normal breath sounds.   Abdominal:      Palpations: Abdomen is soft.      Comments: Fetal heart tones 150s   Lymphadenopathy:      Cervical: No cervical adenopathy.   Skin:     Findings: No rash.   Neurological:      Mental Status: She " is alert and oriented to person, place, and time.         Diagnostic Test Results:  Labs reviewed in Epic, reviewed lab results done at a different facility yesterday.  No results found for any visits on 11/10/21.    ASSESSMENT/PLAN:         ICD-10-CM    1. Nausea and vomiting in pregnancy  O21.9 ondansetron (ZOFRAN-ODT) 4 MG ODT tab     ondansetron (ZOFRAN-ODT) ODT tab 4 mg     Patient is reassured that labs done yesterday look okay.  There is evidence of decreased protein intake, but currently no evidence of dehydration.  No indication for IV fluids at this time.  It sounds like she really has not tried anything yet for management of her symptoms.  We did discuss the option of vitamin B6 and Unisom, however due to the severity of her reported symptoms and noted weight loss, would recommend advancing to Zofran.  Prescription provided.  Discussed other recommendations that may help reduce her symptoms.  She will follow-up with her regular provider as needed.      Makenna Santana MD  [facility] AND [facility]

## 2021-11-12 ENCOUNTER — MYC MEDICAL ADVICE (OUTPATIENT)
Dept: OBGYN | Facility: OTHER | Age: 26
End: 2021-11-12
Payer: COMMERCIAL

## 2021-12-07 ENCOUNTER — OFFICE VISIT (OUTPATIENT)
Dept: FAMILY MEDICINE | Facility: OTHER | Age: 26
End: 2021-12-07
Attending: NURSE PRACTITIONER

## 2021-12-07 VITALS
DIASTOLIC BLOOD PRESSURE: 58 MMHG | WEIGHT: 273.5 LBS | TEMPERATURE: 99.6 F | RESPIRATION RATE: 16 BRPM | BODY MASS INDEX: 48.46 KG/M2 | OXYGEN SATURATION: 99 % | SYSTOLIC BLOOD PRESSURE: 96 MMHG | HEIGHT: 63 IN | HEART RATE: 94 BPM

## 2021-12-07 DIAGNOSIS — O21.9 NAUSEA AND VOMITING IN PREGNANCY: Primary | ICD-10-CM

## 2021-12-07 PROCEDURE — 99213 OFFICE O/P EST LOW 20 MIN: CPT | Performed by: NURSE PRACTITIONER

## 2021-12-07 ASSESSMENT — ANXIETY QUESTIONNAIRES
7. FEELING AFRAID AS IF SOMETHING AWFUL MIGHT HAPPEN: NOT AT ALL
1. FEELING NERVOUS, ANXIOUS, OR ON EDGE: NOT AT ALL
4. TROUBLE RELAXING: NOT AT ALL
3. WORRYING TOO MUCH ABOUT DIFFERENT THINGS: NOT AT ALL
6. BECOMING EASILY ANNOYED OR IRRITABLE: NOT AT ALL
IF YOU CHECKED OFF ANY PROBLEMS ON THIS QUESTIONNAIRE, HOW DIFFICULT HAVE THESE PROBLEMS MADE IT FOR YOU TO DO YOUR WORK, TAKE CARE OF THINGS AT HOME, OR GET ALONG WITH OTHER PEOPLE: NOT DIFFICULT AT ALL
GAD7 TOTAL SCORE: 0
2. NOT BEING ABLE TO STOP OR CONTROL WORRYING: NOT AT ALL
5. BEING SO RESTLESS THAT IT IS HARD TO SIT STILL: NOT AT ALL

## 2021-12-07 ASSESSMENT — MIFFLIN-ST. JEOR: SCORE: 1944.72

## 2021-12-07 ASSESSMENT — PAIN SCALES - GENERAL: PAINLEVEL: NO PAIN (0)

## 2021-12-07 NOTE — NURSING NOTE
"Chief Complaint   Patient presents with     Emesis During Pregnancy       Initial BP 96/58 (BP Location: Left arm, Patient Position: Chair, Cuff Size: Adult Large)   Pulse 94   Temp 99.6  F (37.6  C) (Tympanic)   Resp 16   Ht 1.6 m (5' 3\")   Wt 124.1 kg (273 lb 8 oz)   LMP 07/10/2021   SpO2 99%   BMI 48.45 kg/m   Estimated body mass index is 48.45 kg/m  as calculated from the following:    Height as of this encounter: 1.6 m (5' 3\").    Weight as of this encounter: 124.1 kg (273 lb 8 oz).  Medication Reconciliation: complete  Pamela M. Lechevalier, LPN    "

## 2021-12-07 NOTE — PROGRESS NOTES
Assessment & Plan     1. Nausea and vomiting in pregnancy  Small frequent meals and snacks, bland diet  Consider Ensure, glucerna, protein shakes  Increase water intake - if cannot keep any fluids down for over 24 hours, to ED for evaluation of dehydration.    Follow-up with [facility] as scheduled.   zofran as needed    OB notes reviewed.  Did speak with OB who indicated to continue current plan, follow-up with [facility] as scheduled due to transfer of care.     Follow-up as needed     Saadia Cross NP  [facility]    Andrew Treviño is a 26 year old who presents for the following health issues  accompanied by her spouse.    HPI     Concern - severe nausea with pregnancy   Onset: 21 weeks   Description: emesis after every meal   Intensity: mild, moderate  Progression of Symptoms:  same  Accompanying Signs & Symptoms: worse after eating   Previous history of similar problem: no  Precipitating factors:        Worsened by: eating   Alleviating factors:        Improved by: nothing   Therapies tried and outcome: zofran makes throw up         Patient Active Problem List   Diagnosis     Mixed hearing loss     Acute recurrent maxillary sinusitis     ACP (advance care planning)     Other iron deficiency anemia     Bariatric surgery status     Vasovagal near syncope     Crouzon craniofacial dysostosis     Morbid obesity (H)     Pulmonary nodules     Abdominal pannus     Dermatitis fungal     GERD (gastroesophageal reflux disease)     Hearing loss     Vitamin D deficiency     Female infertility     High-risk pregnancy in first trimester     Past Surgical History:   Procedure Laterality Date     ADENOIDECTOMY       GI SURGERY      [facility]      MOUTH SURGERY  8/2011    wisdom teeth extracted     PET placement       sagital suture release  6/1994     SEPTOPLASTY, TURBINOPLASTY, COMBINED N/A 6/13/2017    Procedure: COMBINED SEPTOPLASTY, TURBINOPLASTY;  SEPTOPLASTY, TURBINATE REDUCTION;   Surgeon: Gina Casillas MD;  Location: [facility]     TONSILLECTOMY         Social History     Tobacco Use     Smoking status: Never Smoker     Smokeless tobacco: Never Used   Substance Use Topics     Alcohol use: No     Family History   Problem Relation Age of Onset     Hypertension Mother      Migraines Mother      Diabetes Paternal Grandfather      Hypertension Maternal Uncle      Hypertension Maternal Grandfather      Hypertension Maternal Grandmother      Depression Sister      Unknown/Adopted Paternal Grandmother          Current Outpatient Medications   Medication Sig Dispense Refill     albuterol (PROAIR HFA/PROVENTIL HFA/VENTOLIN HFA) 108 (90 Base) MCG/ACT inhaler Inhale 2 puffs into the lungs every 4 hours as needed for shortness of breath / dyspnea or wheezing 18 g 1     Calcium-Vitamin D-Vitamin K (VIACTIV PO) Take 1 tablet by mouth daily.        ondansetron (ZOFRAN-ODT) 4 MG ODT tab Take 1 tablet (4 mg) by mouth every 8 hours as needed for nausea 20 tablet 1     Prenatal Vit-Fe Fumarate-FA (PRENATAL MULTIVITAMIN W/IRON) 27-0.8 MG tablet Take 1 tablet by mouth daily 90 tablet 3     vitamin B-12 (CYANOCOBALAMIN) 250 MCG tablet Take 250 mcg by mouth daily       pantoprazole (PROTONIX) 40 MG EC tablet Take 40 mg by mouth  (Patient not taking: Reported on 12/7/2021)       Allergies   Allergen Reactions     Hydrocodone-Acetaminophen Nausea and Vomiting     Morphine Nausea and Vomiting     Sulfa Drugs Hives     SULFA (SULFONAMIDE ANTIBIOTICS)     Topamax Diarrhea     Topiramate Diarrhea     Recent Labs   Lab Test 11/09/21  1036 02/17/20  1157 02/06/20  1031 11/01/19  0836   A1C  --  5.0  --   --    ALT 10  --  25 22   CR 0.57 0.66 0.58 0.63   GFRESTIMATED >90 >90 >90 >90   GFRESTBLACK  --  >90 >90 >90   POTASSIUM 4.3 4.1 3.8 4.0   TSH 0.65 1.42  --   --       BP Readings from Last 3 Encounters:   12/07/21 96/58   11/10/21 114/64   11/09/21 124/72    Wt Readings from Last 3 Encounters:   12/07/21 124.1 kg  "(273 lb 8 oz)   11/10/21 124.5 kg (274 lb 8 oz)   11/09/21 125.2 kg (276 lb)                 Review of Systems   Constitutional, HEENT, cardiovascular, pulmonary, gi and gu systems are negative, except as otherwise noted.      Objective    BP 96/58 (BP Location: Left arm, Patient Position: Chair, Cuff Size: Adult Large)   Pulse 94   Temp 99.6  F (37.6  C) (Tympanic)   Resp 16   Ht 1.6 m (5' 3\")   Wt 124.1 kg (273 lb 8 oz)   LMP 07/10/2021   SpO2 99%   BMI 48.45 kg/m    Body mass index is 48.45 kg/m .  Physical Exam   GENERAL: healthy, alert and no distress  MS: no gross musculoskeletal defects noted, no edema  PSYCH: mentation appears normal, affect normal/bright                "

## 2021-12-07 NOTE — PATIENT INSTRUCTIONS
Assessment & Plan     1. Nausea and vomiting in pregnancy  Small frequent meals and snacks, bland diet  Consider Ensure, glucerna, protein shakes  Increase water intake - if cannot keep any fluids down for over 24 hours, to ED for evaluation of dehydration.    Follow-up with [facility] as scheduled.   zofran as needed      Follow-up as needed     Saadia Cross, NP  [facility]

## 2021-12-08 ENCOUNTER — E-VISIT (OUTPATIENT)
Dept: FAMILY MEDICINE | Facility: OTHER | Age: 26
End: 2021-12-08
Attending: NURSE PRACTITIONER

## 2021-12-08 DIAGNOSIS — J06.9 VIRAL URI: Primary | ICD-10-CM

## 2021-12-08 PROCEDURE — 99421 OL DIG E/M SVC 5-10 MIN: CPT | Performed by: NURSE PRACTITIONER

## 2021-12-08 ASSESSMENT — PATIENT HEALTH QUESTIONNAIRE - PHQ9: SUM OF ALL RESPONSES TO PHQ QUESTIONS 1-9: 0

## 2021-12-08 ASSESSMENT — ANXIETY QUESTIONNAIRES: GAD7 TOTAL SCORE: 0

## 2021-12-08 NOTE — TELEPHONE ENCOUNTER
Provider E-Visit time total (minutes): 10    ELECTRONIC VISIT DOCUMENTATION:    SUBJECTIVE:  Note above accurately captures the substance of my conversation with the patient.    ASSESSMENT/ PLAN:  1. Viral URI  Symptomatic cares, due to pregnancy, limit OTC medications.    Follow-up as needed    Saadia Cross,   Certified Adult Nurse Practitioner  [phone]

## 2022-02-28 ENCOUNTER — TRANSFERRED RECORDS (OUTPATIENT)
Dept: HEALTH INFORMATION MANAGEMENT | Facility: CLINIC | Age: 27
End: 2022-02-28

## 2022-03-07 ENCOUNTER — TRANSFERRED RECORDS (OUTPATIENT)
Dept: HEALTH INFORMATION MANAGEMENT | Facility: CLINIC | Age: 27
End: 2022-03-07

## 2022-03-13 ENCOUNTER — HEALTH MAINTENANCE LETTER (OUTPATIENT)
Age: 27
End: 2022-03-13

## 2022-03-29 ENCOUNTER — TRANSFERRED RECORDS (OUTPATIENT)
Dept: HEALTH INFORMATION MANAGEMENT | Facility: CLINIC | Age: 27
End: 2022-03-29

## 2023-01-14 ENCOUNTER — HEALTH MAINTENANCE LETTER (OUTPATIENT)
Age: 28
End: 2023-01-14

## 2023-04-11 ENCOUNTER — TELEPHONE (OUTPATIENT)
Dept: FAMILY MEDICINE | Facility: OTHER | Age: 28
End: 2023-04-11
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
## 2023-04-11 NOTE — TELEPHONE ENCOUNTER
9:19 AM    Reason for Call: OVERBOOK    Patient is having the following symptoms: possible ear infection for 10 days.    The patient is requesting an appointment for today with [facility] One[facility] or any PCP.    Was an appointment offered for this call? Yes  If yes : Appointment type              Date 4-12-23      Preferred method for responding to this message: Telephone Call  What is your phone number ? [phone]    If we cannot reach you directly, may we leave a detailed response at the number you provided? Yes    Can this message wait until your PCP/provider returns, if unavailable today? Julia Moon

## 2023-04-12 ENCOUNTER — OFFICE VISIT (OUTPATIENT)
Dept: FAMILY MEDICINE | Facility: OTHER | Age: 28
End: 2023-04-12
Attending: NURSE PRACTITIONER
Payer: COMMERCIAL

## 2023-04-12 VITALS
HEIGHT: 63 IN | WEIGHT: 261 LBS | SYSTOLIC BLOOD PRESSURE: 132 MMHG | HEART RATE: 83 BPM | BODY MASS INDEX: 46.25 KG/M2 | TEMPERATURE: 99.3 F | OXYGEN SATURATION: 98 % | RESPIRATION RATE: 16 BRPM | DIASTOLIC BLOOD PRESSURE: 70 MMHG

## 2023-04-12 DIAGNOSIS — H65.191 OTHER NON-RECURRENT ACUTE NONSUPPURATIVE OTITIS MEDIA OF RIGHT EAR: Primary | ICD-10-CM

## 2023-04-12 DIAGNOSIS — E66.01 MORBID OBESITY (H): ICD-10-CM

## 2023-04-12 DIAGNOSIS — J34.2 DEVIATED NASAL SEPTUM: ICD-10-CM

## 2023-04-12 PROCEDURE — G0463 HOSPITAL OUTPT CLINIC VISIT: HCPCS

## 2023-04-12 PROCEDURE — 99214 OFFICE O/P EST MOD 30 MIN: CPT | Performed by: NURSE PRACTITIONER

## 2023-04-12 RX ORDER — AMOXICILLIN 875 MG
875 TABLET ORAL 2 TIMES DAILY
Qty: 20 TABLET | Refills: 0 | Status: SHIPPED | OUTPATIENT
Start: 2023-04-12 | End: 2023-04-22

## 2023-04-12 ASSESSMENT — ANXIETY QUESTIONNAIRES
1. FEELING NERVOUS, ANXIOUS, OR ON EDGE: NOT AT ALL
GAD7 TOTAL SCORE: 0
GAD7 TOTAL SCORE: 0
4. TROUBLE RELAXING: NOT AT ALL
6. BECOMING EASILY ANNOYED OR IRRITABLE: NOT AT ALL
7. FEELING AFRAID AS IF SOMETHING AWFUL MIGHT HAPPEN: NOT AT ALL
IF YOU CHECKED OFF ANY PROBLEMS ON THIS QUESTIONNAIRE, HOW DIFFICULT HAVE THESE PROBLEMS MADE IT FOR YOU TO DO YOUR WORK, TAKE CARE OF THINGS AT HOME, OR GET ALONG WITH OTHER PEOPLE: NOT DIFFICULT AT ALL
5. BEING SO RESTLESS THAT IT IS HARD TO SIT STILL: NOT AT ALL
3. WORRYING TOO MUCH ABOUT DIFFERENT THINGS: NOT AT ALL
2. NOT BEING ABLE TO STOP OR CONTROL WORRYING: NOT AT ALL

## 2023-04-12 ASSESSMENT — PATIENT HEALTH QUESTIONNAIRE - PHQ9: SUM OF ALL RESPONSES TO PHQ QUESTIONS 1-9: 1

## 2023-04-12 ASSESSMENT — PAIN SCALES - GENERAL: PAINLEVEL: NO PAIN (0)

## 2023-04-12 NOTE — PATIENT INSTRUCTIONS
"  Assessment & Plan     1. Other non-recurrent acute nonsuppurative otitis media of right ear  - amoxicillin (AMOXIL) 875 MG tablet; Take 1 tablet (875 mg) by mouth 2 times daily for 10 days  Dispense: 20 tablet; Refill: 0    2. Deviated nasal septum  Repeat CT due to repeat injury and pain  - CT Sinus w/o Contrast; Future    3. Morbid obesity (H)  Continue weight loss efforts.        BMI:   Estimated body mass index is 46.23 kg/m  as calculated from the following:    Height as of this encounter: 1.6 m (5' 3\").    Weight as of this encounter: 118.4 kg (261 lb).   Weight management plan: Discussed healthy diet and exercise guidelines    Follow-up if no improvement or any worsening.       Saadia Cross NP  [facility]  "

## 2023-04-23 ENCOUNTER — HEALTH MAINTENANCE LETTER (OUTPATIENT)
Age: 28
End: 2023-04-23

## 2023-05-30 ENCOUNTER — TELEPHONE (OUTPATIENT)
Dept: FAMILY MEDICINE | Facility: OTHER | Age: 28
End: 2023-05-30

## 2023-05-30 NOTE — TELEPHONE ENCOUNTER
11:39 AM    Reason for Call: Phone Call    Description: Patient called in wanting to make a lab appointment to confirm pregnancy. Please call patient back.    Was an appointment offered for this call? No  If yes : Appointment type              Date    Preferred method for responding to this message: Telephone Call  What is your phone number ? [phone]    If we cannot reach you directly, may we leave a detailed response at the number you provided? Yes    Can this message wait until your PCP/provider returns, if available today? YES    Polina Nelson

## 2023-06-05 ENCOUNTER — OFFICE VISIT (OUTPATIENT)
Dept: FAMILY MEDICINE | Facility: OTHER | Age: 28
End: 2023-06-05
Attending: NURSE PRACTITIONER
Payer: COMMERCIAL

## 2023-06-05 VITALS
TEMPERATURE: 100 F | RESPIRATION RATE: 20 BRPM | HEART RATE: 116 BPM | DIASTOLIC BLOOD PRESSURE: 72 MMHG | OXYGEN SATURATION: 98 % | BODY MASS INDEX: 49.14 KG/M2 | WEIGHT: 277.4 LBS | SYSTOLIC BLOOD PRESSURE: 126 MMHG

## 2023-06-05 DIAGNOSIS — E55.9 VITAMIN D DEFICIENCY: ICD-10-CM

## 2023-06-05 DIAGNOSIS — Z32.00 POSSIBLE PREGNANCY: ICD-10-CM

## 2023-06-05 DIAGNOSIS — E66.01 MORBID OBESITY (H): ICD-10-CM

## 2023-06-05 DIAGNOSIS — Z98.84 BARIATRIC SURGERY STATUS: ICD-10-CM

## 2023-06-05 DIAGNOSIS — O09.91 HIGH-RISK PREGNANCY IN FIRST TRIMESTER: Primary | ICD-10-CM

## 2023-06-05 PROBLEM — D50.9 IRON DEFICIENCY ANEMIA: Status: ACTIVE | Noted: 2022-03-29

## 2023-06-05 PROBLEM — O09.93 SUPERVISION OF HIGH RISK PREGNANCY IN THIRD TRIMESTER: Status: ACTIVE | Noted: 2022-03-29

## 2023-06-05 LAB — HCG UR QL: POSITIVE

## 2023-06-05 PROCEDURE — 99213 OFFICE O/P EST LOW 20 MIN: CPT | Performed by: NURSE PRACTITIONER

## 2023-06-05 PROCEDURE — G0463 HOSPITAL OUTPT CLINIC VISIT: HCPCS

## 2023-06-05 PROCEDURE — 81025 URINE PREGNANCY TEST: CPT | Mod: ZL | Performed by: NURSE PRACTITIONER

## 2023-06-05 RX ORDER — PRENATAL VIT/IRON FUM/FOLIC AC 27MG-0.8MG
1 TABLET ORAL DAILY
Qty: 90 TABLET | Refills: 3 | Status: SHIPPED | OUTPATIENT
Start: 2023-06-05

## 2023-06-05 ASSESSMENT — PAIN SCALES - GENERAL: PAINLEVEL: NO PAIN (0)

## 2023-06-05 NOTE — PROGRESS NOTES
Assessment & Plan     1. High-risk pregnancy in rst trimester  Chart CC'd to OB/GYN, Dr. Brandon for review. Kamila would prefer to do as much locally as possible.  Aware that they may need to travel and willing to do so if necessary.   - Ob/Gyn Referral  - Prenatal Vit-Fe Fumarate-FA (PRENATAL MULTIVITAMIN W/IRON) 27-0.8 MG tablet; Take 1 tablet by mouth daily  Dispense: 90 tablet; Refill: 3    2. Morbid obesity (H)  Noted. BMI 49.14%    3. Vitamin D deficiency  - Vitamin D Deficiency; Future    4. Bariatric surgery status    5. Possible pregnancy  - HCG Qual, Urine ([id number]); Future  - HCG Qual, Urine ([id number])      Ordering of each unique test   .    No follow-ups on file.    Myrna Oneil, [facility] [place] MIKE Treviño is a 28 year old, presenting for the following health issues:  No chief complaint on file.         View : No data to display.              HPI     Confirm Pregnancy    Last Menstrual Cycle: 4/30/23- first day of last menses    Home Test:  Positive on 5/30/23      Review of Systems   Constitutional, HEENT, cardiovascular, pulmonary, gi and gu systems are negative, except as otherwise noted.      Objective    LMP 03/29/2023 (Exact Date)   There is no height or weight on file to calculate BMI.  Physical Exam   GENERAL: healthy, alert and no distress  NECK: no adenopathy, no asymmetry, masses, or scars and thyroid normal to palpation  RESP: lungs clear to auscultation - no rales, rhonchi or wheezes  CV: regular rate and rhythm, normal S1 S2, no S3 or S4, no murmur, click or rub, no peripheral edema and peripheral pulses strong  ABDOMEN: soft, nontender, no hepatosplenomegaly, no masses and bowel sounds normal  MS: no gross musculoskeletal defects noted, no edema    Results for orders placed or performed in visit on 06/05/23   HCG Qual, Urine ([id number])     Status: Abnormal   Result Value Ref Range    hCG Urine Qualitative Positive (A) Negative

## 2023-06-22 ENCOUNTER — PRENATAL OFFICE VISIT (OUTPATIENT)
Dept: OBGYN | Facility: OTHER | Age: 28
End: 2023-06-22
Attending: OBSTETRICS & GYNECOLOGY
Payer: COMMERCIAL

## 2023-06-22 VITALS
SYSTOLIC BLOOD PRESSURE: 100 MMHG | DIASTOLIC BLOOD PRESSURE: 50 MMHG | HEART RATE: 87 BPM | BODY MASS INDEX: 49.46 KG/M2 | WEIGHT: 279.2 LBS

## 2023-06-22 DIAGNOSIS — O09.91 SUPERVISION OF HIGH-RISK PREGNANCY, FIRST TRIMESTER: Primary | ICD-10-CM

## 2023-06-22 PROBLEM — N97.9 FEMALE INFERTILITY: Status: RESOLVED | Noted: 2021-05-05 | Resolved: 2023-06-22

## 2023-06-22 PROBLEM — O09.93 SUPERVISION OF HIGH RISK PREGNANCY IN THIRD TRIMESTER: Status: RESOLVED | Noted: 2022-03-29 | Resolved: 2023-06-22

## 2023-06-22 PROCEDURE — 99214 OFFICE O/P EST MOD 30 MIN: CPT | Performed by: OBSTETRICS & GYNECOLOGY

## 2023-06-22 PROCEDURE — G0463 HOSPITAL OUTPT CLINIC VISIT: HCPCS

## 2023-06-22 PROCEDURE — G0463 HOSPITAL OUTPT CLINIC VISIT: HCPCS | Mod: 25

## 2023-06-22 ASSESSMENT — PAIN SCALES - GENERAL: PAINLEVEL: NO PAIN (0)

## 2023-06-22 NOTE — PROGRESS NOTES
CHIEF COMPLAINT / REASON FOR VISIT  Patient presents for obstetrical confirmation exam    SUBJECTIVE  Kamila Greer is a 28 year old  with Patient's last menstrual period was 2023. and her Estimated Date of Delivery: 2024 determined by LMP. Gestational age is 7w4d. The patient presents for obstetrical confirmation exam. The patient reports a positive home pregnancy test on 2023. Her menstrual cycles were regular. Her last menstrual period was normal. This pregnancy is Planned, Desired. The patient is excited to be pregnant. Since becoming pregnant, she denies use of alcohol, tobacco and street drugs.  She has mild morning nausea but denies emesis.    She had a high risk pregnancy with delivery last year on 2023 at 37 4/7 wk, , Precipitous delivery, Pregnancy is high risk with Maternal Crouzon syndrome (form of craniosynostosis), Morbid obesity BMI >49, h/o sleeve gastrectomy, chronic anemia, insufficient weight gain.    Past Obstetric history:  x 1, SAB x 2.  Relationship with FOB: .  STD History: No STD history.  Last Pap smear history: Normal.  Nausea/vomiting: As above.  Vaginal bleeding: Denies.  Abdominal or pelvic pain, cramping, tenderness: Denies.  Difficulty urinating: Denies.  Breast tenderness: Denies.  Fatigue: Denies.  Headache or vision changes: Denies.  Depression symptoms: Denies.  Other concerns: None.    ALLERGIES     Allergies   Allergen Reactions     Hydrocodone-Acetaminophen Nausea and Vomiting     Morphine Nausea and Vomiting     Sulfa Antibiotics Hives     SULFA (SULFONAMIDE ANTIBIOTICS)     Topamax Diarrhea     Topiramate Diarrhea       MEDICATIONS    Current Outpatient Medications:      Prenatal Vit-Fe Fumarate-FA (PRENATAL MULTIVITAMIN W/IRON) 27-0.8 MG tablet, Take 1 tablet by mouth daily, Disp: 90 tablet, Rfl: 3     vitamin B-12 (CYANOCOBALAMIN) 250 MCG tablet, Take 250 mcg by mouth daily, Disp: , Rfl:     REVIEW OF SYSTEMS  As per HPI,  otherwise negative.    The patient's Medical Hx, Surgical Hx, Obstetrical Hx, Social Hx, and Family Hx have been reviewed and updated in the electronic medical record.    OBJECTIVE  /50   Pulse 87   Wt 126.6 kg (279 lb 3.2 oz)   LMP 04/30/2023   BMI 49.46 kg/m      General:  Well-developed, well-nourished female in no apparent distress.  Mental:  Alert and oriented times three.  Affect pleasant.  Mood happy.  Abdomen: Soft, obese, non tender, positive bowel sounds. No organomegaly. No rebound, no guarding. Fundal height below umbilicus.  Fetal heart tones not auscultated.  Cervix: deferred  Extremities:  No clubbing, cyanosis, or edema. Nontender bilaterally.    DIAGNOSTICS  Office OB ultrasound shows intrauterine gestational sac at 6 weeks gestation without embryo, yolk sac, cardiac activity, adnexal masses, or free fluid. Exam was limited by body habitus    ASSESSMENT / PLAN  1 Intrauterine pregnancy at 7w4d.  2 Morbid obesity BMI >49  3 Maternal Crouzon syndrome (form of craniosynostosis)  4 History of sleeve gastrectomy  5 Chronic anemia  6 Nausea in pregnancy    - Problem list reviewed and updated.  - Congratulations is given to the patient.  - I will be more than happy to follow the patient during this pregnancy in the first trimester.  - We are unable to deliver the patient at our facility due to her morbid obesity and medical complications.  - This is a high risk pregnancy and I recommend transfer of care with complete prenatal care and delivery at a tertiary care center.  She desires to be seen at [facility] where she was cared for with her pregnancy last year.  - I recommend MFM consult and level 2 ultrasound at 18-22 wk.  - I discussed prenatal labs.  - I recommend prenatal vitamins.  - I discussed and ordered a first trimester obstetrics ultrasound.  Radiology will contact the patient to schedule.  - Symptomatic relief measures for nausea in pregnancy are reviewed with the  patient.  - I reviewed routine obstetrical precautions, recommendations and instructions with the patient.  - I reviewed miscarriage precautions with the patient.  - I discussed routine healthy diet recommendations, exercise recommendations and weight gain recommendations in pregnancy.  - Follow up in 2-3 weeks after OB ultrasound.    Ethan Brandon MD  Obstetrics and Gynecology

## 2023-07-25 ENCOUNTER — E-VISIT (OUTPATIENT)
Dept: URGENT CARE | Facility: CLINIC | Age: 28
End: 2023-07-25

## 2023-07-25 DIAGNOSIS — H10.30 ACUTE CONJUNCTIVITIS, UNSPECIFIED ACUTE CONJUNCTIVITIS TYPE, UNSPECIFIED LATERALITY: Primary | ICD-10-CM

## 2023-07-25 PROCEDURE — 99207 PR NON-BILLABLE SERV PER CHARTING: CPT | Performed by: FAMILY MEDICINE

## 2023-07-25 NOTE — PATIENT INSTRUCTIONS
Thank you for choosing us for your care. I am unable to prescribe the medication for your child as I do not have the chart nor E-visit for them.  Please utilize the phone number below to complete an E-visit for the appropriate patient..    You can schedule an appointment right here in [facility], or call [phone]      Thank you,  Urgent care provider

## 2023-07-31 ENCOUNTER — OFFICE VISIT (OUTPATIENT)
Dept: FAMILY MEDICINE | Facility: OTHER | Age: 28
End: 2023-07-31
Attending: NURSE PRACTITIONER
Payer: COMMERCIAL

## 2023-07-31 VITALS
TEMPERATURE: 98.6 F | OXYGEN SATURATION: 98 % | DIASTOLIC BLOOD PRESSURE: 70 MMHG | BODY MASS INDEX: 48.93 KG/M2 | RESPIRATION RATE: 21 BRPM | HEART RATE: 85 BPM | WEIGHT: 276.2 LBS | SYSTOLIC BLOOD PRESSURE: 110 MMHG

## 2023-07-31 DIAGNOSIS — J01.10 ACUTE NON-RECURRENT FRONTAL SINUSITIS: Primary | ICD-10-CM

## 2023-07-31 DIAGNOSIS — H10.33 ACUTE BACTERIAL CONJUNCTIVITIS OF BOTH EYES: ICD-10-CM

## 2023-07-31 PROCEDURE — G0463 HOSPITAL OUTPT CLINIC VISIT: HCPCS

## 2023-07-31 PROCEDURE — 99213 OFFICE O/P EST LOW 20 MIN: CPT | Performed by: NURSE PRACTITIONER

## 2023-07-31 RX ORDER — OFLOXACIN 3 MG/ML
1-2 SOLUTION/ DROPS OPHTHALMIC
Qty: 7 ML | Refills: 0 | Status: SHIPPED | OUTPATIENT
Start: 2023-07-31 | End: 2023-08-07

## 2023-07-31 ASSESSMENT — PAIN SCALES - GENERAL: PAINLEVEL: NO PAIN (0)

## 2023-07-31 NOTE — PROGRESS NOTES
"  Assessment & Plan     1. Acute non-recurrent frontal sinusitis  - ofloxacin (OCUFLOX) 0.3 % ophthalmic solution; Place 1-2 drops into both eyes every 2 hours (while awake) for 7 days  Dispense: 7 mL; Refill: 0    2. Acute bacterial conjunctivitis of both eyes  If symptoms do not improve over the next week, she will follow up with ophthalmology  - amoxicillin-clavulanate (AUGMENTIN) 875-125 MG tablet; Take 1 tablet by mouth 2 times daily for 7 days  Dispense: 14 tablet; Refill: 0      Prescription drug management  No LOS data to display   Time spent by me doing chart review, history and exam, documentation and further activities per the note     No follow-ups on file.    ADRIANNA Mir PA-C student ADRIANNA Mir present during visit with patient's informed consent.   History is reviewed with patient and pertinent portions of the exam are repeated.  Assessment and plan is reviewed with the patient.      Myrna Oneil, [facility]    Andrew Treviño is a 28 year old, presenting for the following health issues:  Eye Problem      HPI     Son recently treated for conjunctivitis, she started to get similar sx 4 days ago. Used some of son's topical antibiotic, which improved symptoms. She is also having URI symptoms, including a cough. Yesterday morning, she coughed hard causing her to vomit and she felt something \"burst\" by her right eye. Yesterday afternoon she noticed bruising underneath her right eye. Continues to worsen and swell. Blurry vision. Drainage bilaterally with crusting. Some headache today. No eye discharge. Pain when bending over or looking up feels it puts more pressure on her eyes.     Eye(s) Problem  Onset/Duration: couple days  Description:   Location: bilateral eyes  Pain: No  Redness: YES  Accompanying Signs & Symptoms:  Discharge/mattering: YES  Swelling: YES  Visual changes: YES- blurry vision at times  Fever: No  Nasal Congestion: " No  Bothered by bright lights: No  History:  Trauma: No  Foreign body exposure: No  Wearing contacts: No  Precipitating or alleviating factors: None  Therapies tried and outcome: None    Review of Systems   Constitutional, HEENT, cardiovascular, pulmonary, gi and gu systems are negative, except as otherwise noted.      Objective    /70 (BP Location: Right arm, Patient Position: Sitting, Cuff Size: Adult Regular)   Pulse 85   Temp 98.6  F (37  C) (Tympanic)   Resp 21   Wt 125.3 kg (276 lb 3.2 oz)   LMP 04/30/2023   SpO2 98%   Breastfeeding Unknown   BMI 48.93 kg/m    Body mass index is 48.93 kg/m .  Physical Exam   GENERAL: alert and no distress  EYES: PERRL, EOMI, eyelids- periorbital edema OU, erythema present; conjunctiva/corneas- conjunctival injection each eye. Bruising and swelling present underneath right eye.   HENT: ear canals and TM's normal, nose and mouth without ulcers or lesions  NECK: no adenopathy, no asymmetry, masses, or scars and thyroid normal to palpation  RESP: lungs clear to auscultation - no rales, rhonchi or wheezes  CV: regular rate and rhythm, normal S1 S2, no S3 or S4, no murmur, click or rub, no peripheral edema and peripheral pulses strong

## 2023-12-08 ENCOUNTER — TRANSFERRED RECORDS (OUTPATIENT)
Dept: HEALTH INFORMATION MANAGEMENT | Facility: HOSPITAL | Age: 28
End: 2023-12-08

## 2023-12-08 LAB
HEP C HIM: NORMAL
HIV 1&2 EXT: NORMAL
HPV ABSTRACT: NORMAL
PAP-ABSTRACT: NORMAL

## 2024-01-25 ENCOUNTER — OFFICE VISIT (OUTPATIENT)
Dept: FAMILY MEDICINE | Facility: OTHER | Age: 29
End: 2024-01-25
Attending: NURSE PRACTITIONER
Payer: COMMERCIAL

## 2024-01-25 VITALS
SYSTOLIC BLOOD PRESSURE: 104 MMHG | RESPIRATION RATE: 16 BRPM | WEIGHT: 280 LBS | BODY MASS INDEX: 49.61 KG/M2 | TEMPERATURE: 99.7 F | HEART RATE: 92 BPM | OXYGEN SATURATION: 95 % | DIASTOLIC BLOOD PRESSURE: 60 MMHG | HEIGHT: 63 IN

## 2024-01-25 DIAGNOSIS — B97.89 VIRAL RESPIRATORY ILLNESS: Primary | ICD-10-CM

## 2024-01-25 DIAGNOSIS — J98.8 VIRAL RESPIRATORY ILLNESS: Primary | ICD-10-CM

## 2024-01-25 DIAGNOSIS — J01.90 ACUTE SINUSITIS WITH SYMPTOMS > 10 DAYS: ICD-10-CM

## 2024-01-25 DIAGNOSIS — U07.1 INFECTION DUE TO 2019 NOVEL CORONAVIRUS: ICD-10-CM

## 2024-01-25 LAB
FLUAV RNA SPEC QL NAA+PROBE: NEGATIVE
FLUBV RNA RESP QL NAA+PROBE: NEGATIVE
RSV RNA SPEC NAA+PROBE: NEGATIVE
SARS-COV-2 RNA RESP QL NAA+PROBE: POSITIVE

## 2024-01-25 PROCEDURE — 87637 SARSCOV2&INF A&B&RSV AMP PRB: CPT | Performed by: NURSE PRACTITIONER

## 2024-01-25 PROCEDURE — 99214 OFFICE O/P EST MOD 30 MIN: CPT | Performed by: NURSE PRACTITIONER

## 2024-01-25 RX ORDER — AMOXICILLIN 875 MG
875 TABLET ORAL 2 TIMES DAILY
Qty: 20 TABLET | Refills: 0 | Status: SHIPPED | OUTPATIENT
Start: 2024-01-25 | End: 2024-02-04

## 2024-01-25 ASSESSMENT — PAIN SCALES - GENERAL: PAINLEVEL: NO PAIN (0)

## 2024-01-25 NOTE — ADDENDUM NOTE
Addended by: EBER PHILLIPS on: 1/25/2024 02:23 PM     Modules accepted: Orders, Level of Service

## 2024-01-25 NOTE — PATIENT INSTRUCTIONS
For informational purposes only. Not to replace the advice of your health care provider. Copyright   2022 [facility]. All rights reserved. Clinically reviewed by Christi Casanova, PharmD, BCACP. [employer] [id number] - REV 06/23.  COVID-19 Outpatient Treatments  Your care team can help you find the best treatments for COVID-19. Talk to a health care provider or refer to the FDA medicine fact sheets below.  Paxlovid (nirmatrelvir and ritonavir): https://www.paxlovid.[employer]/resources  Molnupiravir (Lagevrio): https://www.fda.gov/media/534554/download  Important: We can only prescribe Paxlovid or Molnupiravir when it can be started within 5 days of first having symptoms.  Paxlovid (nimatrelvir and ritonavir)  How it works  Two medicines (nirmatrelvir and ritonavir) are taken together. They stop the virus from growing. Less amount of virus is easier for your body to fight.  Benefits  Lowers risk of a hospital stay or death from COVID-19.  How to take  Medicine comes in a daily container with both medicine tablets. Take by mouth twice daily (once in the morning, once at night) for 5 days.  The number of tablets to take varies by patient.  Don't chew or break capsules. Swallow whole.  When to take  Take it as soon as possible and within 5 days of your first symptoms.  Who can take it  Patients must be 12 years or older weigh at least 88 pounds. Paxlovid is the preferred treatment for pregnant patients.  Possible side effects  Can cause altered sense of taste, diarrhea (loose, watery stools), high blood pressure, muscle aches.  Medicine conflicts  Some medicines may conflict with Paxlovid and may cause serious side effects.  Tell your care team about all the medicines you take, including prescription and over-the-counter medicines, vitamins, and herbal supplements.  Your care team will review your medicines to make sure that you can safely take Paxlovid.  Cautions  Paxlovid is not advised for patients with severe  kidney or liver disease. If you have kidney or liver problems, the dose may need to be changed.  If you're pregnant or breastfeeding, talk to your care team about your options.  If you take hormonal birth control (such as the Pill), then you or your partner should also use a non-hormonal form of birth control (such as a condom). Keep doing this for 1 menstrual cycle after your last dose of Paxlovid.  Molnupiravir (lagevrio)  How it works  Stops the virus from growing. Less amount of virus is easier for your body to fight.  Benefits  Lowers risk of a hospital stay or death from COVID-19.  How to take  Take 4 capsules by mouth every 12 hours (4 in the morning and 4 at night) for 5 days. Don't chew or break capsules. Swallow whole.  When to take  Take as soon as possible and within 5 days of your first symptoms.  Who can take it  Patients must be 18 years or older.   Possible side effects  Diarrhea (loose, watery stools), nausea (feeling sick to your stomach), dizziness, headaches.  Medicine conflicts  Right now, there are no known conflicts with other drugs. But tell your care team about all medicines you take.  Cautions  This medicine is not advised for patients who are pregnant.  If you are someone who could become pregnant, use trusted birth control until 4 days after your last dose of molnupiravir.  If your partner could become pregnant, you should use trusted birth control until 3 months after your last dose of molnupiravir.      Instructions for Patients      What are the symptoms of COVID-19?  Symptoms can include fever, cough, shortness of breath, chills, headache, muscle pain sore throat, fatigue, runny or stuffy nose, and loss of taste and smell. Other less common symptoms include nausea, vomiting, or diarrhea (watery stools).    Know when to call 911. Emergency warning signs include:  Trouble breathing or shortness of breath  Pain or pressure in the chest that doesn't go away  Feeling confused like you  haven't felt before, or not being able to wake up  Bluish-colored lips or face    How can I take care of myself?  Get lots of rest. Drink extra fluids (unless a doctor has told you not to).  Take Tylenol (acetaminophen) for fever or pain. If you have liver or kidney problems, ask your family doctor if it's okay to take Tylenol   Adults can take either:   650 mg (two 325 mg pills or tablets) every 4 to 6 hours, or...   1,000 mg (two 500 mg pills) every 8 hours as needed.  Note: Don't take more than 3,000 mg in one day. Acetaminophen is found in many medicines (both prescribed and over the counter). Read all labels to be sure you don't take too much.  For children, check the Tylenol bottle for the right dose. The dose is based on the child's age or weight.  Take over the counter medicines for your symptoms as needed. Talk to your pharmacist.  If you have other health problems (like cancer, heart failure, an organ transplant, or severe kidney disease): Call your specialty clinic if you don't feel better in the next 2 days.    Where can I get more information?  [employer] COVID-19 Resource Hub: [email or web address]   CDC Quarantine & Isolation: https://www.cdc.gov/coronavirus/2019-ncov/your-health/quarantine-isolation.html   CDC - What to Do If You're Sick: https://www.cdc.gov/coronavirus/2019-ncov/if-you-are-sick/index.html  Learn about the ACTIV-6 Clinical Trial: activ6.[facility] or call (228)-737-1345  Coping with Life After COVID-19  Being in the hospital because of COVID-19 is scary. Going home can be scary, too. You may face changes to your life, the way you work or what you can eat. It s hard to adjust to change, and it s normal to feel afraid, frustrated or even angry. These feelings usually go away over time. If your feelings don t start to get better, it s called  adjustment disorder.      What signs should I look out for?  Adjustment disorder can happen to anyone in a time of stress. It makes  it hard to cope with daily life. You may feel lonely or fight with loved ones, even if you re glad to be home. Watch for these signs:  Fear or worry  Hard time focusing  Sadness or anger  Trouble talking to family or friends  Feeling like you don t fit in or isolating yourself  Problems with sleep   Drinking alcohol or taking drugs to cope    What can I do?  You can help yourself get better. Feeling you have control helps you move forward. You may wonder if you ll be able to do things you did before. Be patient. Do your best to make the most of every day. Try to build relationships, be as active as you can, eat right and keep a sense of humor. Avoid smoking and drinking too much alcohol. Call your family doctor or clinic if you re not sure what to do. They can guide you to care or other services.    When should I get help?  Think about getting counseling if your sadness or frustration gets worse. Together with a trained counselor, you can talk about your problems adjusting to life after your hospital stay. You can come up with new ways to handle changes that give you more control. Your family doctor or care team can help you find a counselor.     Get help if you re thinking about hurting yourself. If you need help right away, call 911 or go to the nearest emergency room. You can also try the Crisis Text Line.    Crisis Text Line: 517-801 (http://www.crisistextline.org)  The Crisis Text Line serves anyone, in any crisis. It gives free, 24/7 support. Here's how it works:  Text HOME to 423392 from anywhere in the [place], anytime, about any type of crisis.  A live, trained Crisis Counselor will text you back quickly.  The volunteer Crisis Counselor can help you move from a  hot moment  to a  cool moment.  They can also help you work out a safety plan.

## 2024-01-25 NOTE — PROGRESS NOTES
"  Assessment & Plan     COVID positive.   I did reach out to Kamila's OB provider, SAUL Key to notify Lizz of Kamila's COVID status. Lizz is in support, as am I, of offering Kamila PAXLOVID treatment given her pregnancy status and BMI of 49.60. I have called and spoken with aKmila about this, as well as the limited data in pregnancy. She will give it some thought but wants it available. Understands she needs to start within first 5 days of symptom onset.   New medication education completed with potential risks, benefits, administration, and potential side effects.   Isolation/masking reviewed per CDC calculator.   Please see the CDC and/or the [place] Dept of Health websites for additional information about COVID and/or ANTIVIRAL therapy.     No drug- drug interactions found during review.     Viral respiratory illness  Based on history, it sounds as though you have an acute viral infection on top of a pre-existing sinus infection. You should see results for RSVFLU/COVID come back this afternoon/evening through [facility]. If there area abnormal results we will also reach out to you via phone.     Acute sinusitis with symptoms > 10 days  Kamila has concerns about taking any medications during pregnancy but is accepting of an antibiotic as she is quite uncomfortable with the sinus pressure. Discussed options and she has opted for amoxicillin for the safety profile in pregnancy.   - amoxicillin (AMOXIL) 875 MG tablet; Take 1 tablet (875 mg) by mouth 2 times daily for 10 days    Ordering of each unique test  Prescription drug management  I spent a total of 37 minutes on the day of the visit.   Time spent by me doing chart review, history and exam, documentation and further activities per the note      BMI  Estimated body mass index is 49.6 kg/m  as calculated from the following:    Height as of this encounter: 1.6 m (5' 3\").    Weight as of this encounter: 127 kg (280 lb).           No follow-ups on " "file.    Subjective   Kamila is a 28 year old, presenting for the following health issues:  Sinus Problem and URI (Cold)        1/25/2024     7:57 AM   Additional Questions   Roomed by Ulysses Wren LPN   Accompanied by Self         1/25/2024     7:57 AM   Patient Reported Additional Medications   Patient reports taking the following new medications None     HPI     Acute Illness  Acute illness concerns: Sinuses, Cold  Onset/Duration: 01/23/2024- new symptoms.   Has had lingering sinus congestion for 2-3 weeks. Was not sure if it was related to pregnancy but it has been painful with yellow drainage and pain in the teeth.   Symptoms:  Fever: YES  Chills/Sweats: YES, chills  Headache (location?): No  Sinus Pressure: YES  Conjunctivitis:  No  Ear Pain: no  Rhinorrhea: YES  Congestion: YES  Sore Throat: YES- slightly  Cough: YES-non-productive, productive of clear sputum  Wheeze: No  Decreased Appetite: No  Nausea: No  Vomiting: YES following coughing spells  Diarrhea: YES  Dysuria/Freq.: No  Dysuria or Hematuria: No  Fatigue/Achiness: No  Sick/Strep Exposure: No  Therapies tried and outcome: None          Objective    /60   Pulse 92   Temp 99.7  F (37.6  C) (Tympanic)   Resp 16   Ht 1.6 m (5' 3\")   Wt 127 kg (280 lb)   LMP 04/30/2023   SpO2 95%   BMI 49.60 kg/m    Body mass index is 49.6 kg/m .      Physical Exam   GENERAL: alert and no distress  EYES: Eyes grossly normal to inspection, PERRL and conjunctivae and sclerae normal  HENT: normal cephalic/atraumatic, ear canals and TM's normal, nasal mucosa edematous , rhinorrhea yellow, oral mucous membranes moist, sinuses: maxillary tenderness on right.  Postnasal drainage.   RESP: lungs clear to auscultation - no rales, rhonchi or wheezes  CV: regular rate and rhythm, normal S1 S2, no S3 or S4, no murmur, click or rub, no peripheral edema   NEURO: Normal strength and tone, mentation intact and speech normal  PSYCH: mentation appears normal, affect " normal/bright    Results for orders placed or performed in visit on 01/25/24   Symptomatic Influenza A/B, RSV, & SARS-CoV2 PCR (COVID-19) Nose     Status: Abnormal    Specimen: Nose; Swab   Result Value Ref Range    Influenza A PCR Negative Negative    Influenza B PCR Negative Negative    RSV PCR Negative Negative    SARS CoV2 PCR Positive (A) Negative    Narrative    Testing was performed using the Xpert Xpress CoV2/Flu/RSV Assay on the [employer] GeneXpert Instrument. This test should be ordered for the detection of SARS-CoV-2, influenza, and RSV viruses in individuals who meet clinical and/or epidemiological criteria. Test performance is unknown in asymptomatic patients. This test is for in vitro diagnostic use under the FDA EUA for laboratories certified under CLIA to perform high or moderate complexity testing. This test has not been FDA cleared or approved. A negative result does not rule out the presence of PCR inhibitors in the specimen or target RNA in concentration below the limit of detection for the assay. If only one viral target is positive but coinfection with multiple targets is suspected, the sample should be re-tested with another FDA cleared, approved, or authorized test, if coinfection would change clinical management. This test was validated by the [facility]. These laboratories are certified under the Clinical Laboratory Improvement Amendments of 1988 (CLIA-88) as qualified to perform high complexity laboratory testing.               Signed Electronically by: Myrna Oneil, CNP

## 2024-07-22 ENCOUNTER — MEDICAL CORRESPONDENCE (OUTPATIENT)
Dept: HEALTH INFORMATION MANAGEMENT | Facility: HOSPITAL | Age: 29
End: 2024-07-22

## 2024-10-07 ENCOUNTER — MEDICAL CORRESPONDENCE (OUTPATIENT)
Dept: HEALTH INFORMATION MANAGEMENT | Facility: HOSPITAL | Age: 29
End: 2024-10-07

## 2025-06-09 NOTE — PROGRESS NOTES
"  Assessment & Plan     Acute otitis externa of right ear, unspecified type  Due to fluid within canal being bright green, I am culturing.   - ciprofloxacin-dexAMETHasone (CIPRODEX) 0.3-0.1 % otic suspension; Place 4 drops into the right ear 2 times daily.  - Swab Aerobic Bacterial Culture Routine With Gram Stain; Future  - Swab Aerobic Bacterial Culture Routine With Gram Stain      The longitudinal plan of care for the diagnosis(es)/condition(s) as documented were addressed during this visit. Due to the added complexity in care, I will continue to support Kamila in the subsequent management and with ongoing continuity of care.        BMI  Estimated body mass index is 54.03 kg/m  as calculated from the following:    Height as of this encounter: 1.6 m (5' 3\").    Weight as of this encounter: 138.3 kg (305 lb).       Subjective   Kamila is a 30 year old, presenting for the following health issues:  Ear Problem    History of Present Illness       Reason for visit:  Eae infection  Symptom onset:  1-3 days ago  Symptom intensity:  Moderate  Symptom progression:  Worsening  Had these symptoms before:  Yes  Has tried/received treatment for these symptoms:  Yes  Previous treatment was successful:  Yes  Prior treatment description:  Ciprodex drops  What makes it worse:  Pressure and wearing hearing aid  What makes it better:  No   She is taking medications regularly.        Acute Illness  Acute illness concerns: earache  Onset/Duration: 1 day  Symptoms:  Fever: No  Chills/Sweats: No  Headache (location?): No  Sinus Pressure: No  Conjunctivitis:  No  Ear Pain: YES: right  Rhinorrhea: No  Congestion: No  Sore Throat: No  Cough: YES-non-productive  Wheeze: No  Decreased Appetite: No  Nausea: No  Vomiting: No  Diarrhea: No  Dysuria/Freq.: No  Dysuria or Hematuria: No  Fatigue/Achiness: No  Sick/Strep Exposure: No  Therapies tried and outcome: None    Kamila comes into the office for right sided ear discomfort.  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  Notes some tenderness behind her ear.  Has history of wearing a hearing aid on the right side.  Has some clear drainage from right ear, but notes this is baseline due to her wearing hearing aids.  Denies symptoms of: fever, rash, nausea/vomiting, and seasonal allergies.        Objective    /74 (BP Location: Left arm, Patient Position: Sitting, Cuff Size: Adult Large)   Pulse 71   Temp 99.9  F (37.7  C) (Tympanic)   Resp 16   Ht 1.6 m (5' 3\")   Wt (!) 138.3 kg (305 lb)   LMP 06/09/2025 (Exact Date)   SpO2 98%   Breastfeeding Yes   BMI 54.03 kg/m    Body mass index is 54.03 kg/m .    Physical Exam   GENERAL: alert and no distress  HENT: normal cephalic/atraumatic, right ear: erythematous and bright green-tinted thin drainage, and left ear: normal: no effusions, no erythema, normal landmarks  NECK: no adenopathy, no asymmetry, masses, or scars  RESP: lungs clear to auscultation - no rales, rhonchi or wheezes  CV: regular rate and rhythm, normal S1 S2, no S3 or S4, no murmur, click or rub, no peripheral edema              Nurse Practitioner Student participated care of patient and documentation of the note with direct supervision by me on date of visit. I  attest that I have verified HPI and contributing factors and personally performed a physical exam.  Final medical decision making completed by me. Notes reviewed an approved by me, SAUL Suero, CNP.    Karl Arredondo [facility] student [facility]  Signed Electronically by: Myrna Oneil CNP  "

## 2025-06-10 ENCOUNTER — OFFICE VISIT (OUTPATIENT)
Dept: FAMILY MEDICINE | Facility: OTHER | Age: 30
End: 2025-06-10
Attending: NURSE PRACTITIONER
Payer: COMMERCIAL

## 2025-06-10 VITALS
HEIGHT: 63 IN | HEART RATE: 71 BPM | OXYGEN SATURATION: 98 % | WEIGHT: 293 LBS | TEMPERATURE: 99.9 F | RESPIRATION RATE: 16 BRPM | SYSTOLIC BLOOD PRESSURE: 108 MMHG | BODY MASS INDEX: 51.91 KG/M2 | DIASTOLIC BLOOD PRESSURE: 74 MMHG

## 2025-06-10 DIAGNOSIS — H60.501 ACUTE OTITIS EXTERNA OF RIGHT EAR, UNSPECIFIED TYPE: Primary | ICD-10-CM

## 2025-06-10 RX ORDER — CIPROFLOXACIN AND DEXAMETHASONE 3; 1 MG/ML; MG/ML
4 SUSPENSION/ DROPS AURICULAR (OTIC) 2 TIMES DAILY
Qty: 7.5 ML | Refills: 0 | Status: SHIPPED | OUTPATIENT
Start: 2025-06-10

## 2025-06-10 RX ORDER — CIPROFLOXACIN 0.5 MG/.25ML
0.25 SOLUTION/ DROPS AURICULAR (OTIC) 2 TIMES DAILY
Status: CANCELLED | OUTPATIENT
Start: 2025-06-10

## 2025-06-10 ASSESSMENT — PAIN SCALES - GENERAL: PAINLEVEL_OUTOF10: MILD PAIN (1)

## 2025-06-11 ENCOUNTER — RESULTS FOLLOW-UP (OUTPATIENT)
Dept: FAMILY MEDICINE | Facility: OTHER | Age: 30
End: 2025-06-11

## 2025-06-12 LAB
BACTERIA SPEC CULT: ABNORMAL
BACTERIA SPEC CULT: ABNORMAL
GRAM STAIN RESULT: ABNORMAL

## (undated) DEVICE — GLV-6.5 PROTEXIS PI CLASSIC LF/PF

## (undated) DEVICE — TUBING-IRRIG. SYSTEM CLEARVISION

## (undated) DEVICE — IRRIGATION-H2O 1000ML

## (undated) DEVICE — LABEL-STERILE PREPRINTED FOR OR

## (undated) DEVICE — Device

## (undated) DEVICE — IRRIGATION-NACL 1000ML

## (undated) DEVICE — CAUTERY PAD-POLYHESIVE II ADULT

## (undated) DEVICE — LIGHT HANDLE COVER

## (undated) DEVICE — TOWEL-OR DISP 4PKS

## (undated) DEVICE — PACK-ENT-CUSTOM

## (undated) DEVICE — SOL-NACL 0.9% 1000ML

## (undated) DEVICE — SCD SLEEVE-KNEE REG.

## (undated) DEVICE — SUTURE-SILK 0 SH K834H

## (undated) DEVICE — COBLATION WAND-TURBINATE REDUCT. PTR

## (undated) DEVICE — BLANKET-BAIR LOWER EXTREMITY

## (undated) DEVICE — CANISTER-SUCTION 2000CC

## (undated) DEVICE — BLADE-SCALPEL #15

## (undated) DEVICE — SUTURE-CHROMIC GUT 4-0 RB-1 U203H

## (undated) DEVICE — SUTURE-VICRYL 4-0 P-3 J494G

## (undated) DEVICE — BIN-ENT BIN

## (undated) RX ORDER — PROPOFOL 10 MG/ML
INJECTION, EMULSION INTRAVENOUS
Status: DISPENSED
Start: 2017-06-13

## (undated) RX ORDER — LIDOCAINE HYDROCHLORIDE 20 MG/ML
INJECTION, SOLUTION EPIDURAL; INFILTRATION; INTRACAUDAL; PERINEURAL
Status: DISPENSED
Start: 2017-06-13

## (undated) RX ORDER — DEXAMETHASONE SODIUM PHOSPHATE 10 MG/ML
INJECTION, SOLUTION INTRAMUSCULAR; INTRAVENOUS
Status: DISPENSED
Start: 2017-06-13

## (undated) RX ORDER — ONDANSETRON 4 MG/1
TABLET, ORALLY DISINTEGRATING ORAL
Status: DISPENSED
Start: 2021-11-10

## (undated) RX ORDER — FENTANYL CITRATE 50 UG/ML
INJECTION, SOLUTION INTRAMUSCULAR; INTRAVENOUS
Status: DISPENSED
Start: 2017-06-13

## (undated) RX ORDER — ONDANSETRON 2 MG/ML
INJECTION INTRAMUSCULAR; INTRAVENOUS
Status: DISPENSED
Start: 2017-06-13